# Patient Record
Sex: FEMALE | Race: WHITE | NOT HISPANIC OR LATINO | Employment: UNEMPLOYED | URBAN - METROPOLITAN AREA
[De-identification: names, ages, dates, MRNs, and addresses within clinical notes are randomized per-mention and may not be internally consistent; named-entity substitution may affect disease eponyms.]

---

## 2018-12-03 ENCOUNTER — HOSPITAL ENCOUNTER (INPATIENT)
Facility: HOSPITAL | Age: 38
LOS: 10 days | Discharge: HOME | DRG: 885 | End: 2018-12-13
Attending: EMERGENCY MEDICINE | Admitting: PSYCHIATRY & NEUROLOGY
Payer: MEDICARE

## 2018-12-03 DIAGNOSIS — F41.9 ANXIETY: Primary | ICD-10-CM

## 2018-12-03 PROBLEM — F32.A DEPRESSION: Status: ACTIVE | Noted: 2018-12-03

## 2018-12-03 LAB
ANION GAP SERPL CALC-SCNC: 7 MEQ/L (ref 3–15)
APAP SERPL-MCNC: <10 UG/ML (ref 10–30)
BASOPHILS # BLD: 0.05 K/UL (ref 0.01–0.1)
BASOPHILS NFR BLD: 0.6 %
BUN SERPL-MCNC: 13 MG/DL (ref 8–20)
CALCIUM SERPL-MCNC: 8.8 MG/DL (ref 8.9–10.3)
CHLORIDE SERPL-SCNC: 107 MEQ/L (ref 98–109)
CO2 SERPL-SCNC: 22 MEQ/L (ref 22–32)
CREAT SERPL-MCNC: 0.6 MG/DL
DIFFERENTIAL METHOD BLD: ABNORMAL
EOSINOPHIL # BLD: 0.21 K/UL (ref 0.04–0.36)
EOSINOPHIL NFR BLD: 2.7 %
ERYTHROCYTE [DISTWIDTH] IN BLOOD BY AUTOMATED COUNT: 12.3 % (ref 11.7–14.4)
ETHANOL SERPL-MCNC: <5 MG/DL
GFR SERPL CREATININE-BSD FRML MDRD: >60 ML/MIN/1.73M*2
GLUCOSE SERPL-MCNC: 77 MG/DL (ref 70–99)
HCG UR QL: NEGATIVE
HCT VFR BLDCO AUTO: 40.8 %
HGB BLD-MCNC: 13.7 G/DL
IMM GRANULOCYTES # BLD AUTO: 0.02 K/UL (ref 0–0.08)
IMM GRANULOCYTES NFR BLD AUTO: 0.3 %
LYMPHOCYTES # BLD: 3.46 K/UL (ref 1.2–3.5)
LYMPHOCYTES NFR BLD: 43.9 %
MCH RBC QN AUTO: 31 PG (ref 28–33.2)
MCHC RBC AUTO-ENTMCNC: 33.6 G/DL (ref 32.2–35.5)
MCV RBC AUTO: 92.3 FL (ref 83–98)
MONOCYTES # BLD: 0.6 K/UL (ref 0.28–0.8)
MONOCYTES NFR BLD: 7.6 %
NEUTROPHILS # BLD: 3.55 K/UL (ref 1.7–7)
NEUTS SEG NFR BLD: 44.9 %
NRBC BLD-RTO: 0.1 %
PDW BLD AUTO: 11.6 FL (ref 9.4–12.3)
PLATELET # BLD AUTO: 192 K/UL
POTASSIUM SERPL-SCNC: 3.7 MEQ/L (ref 3.6–5.1)
RBC # BLD AUTO: 4.42 M/UL (ref 3.93–5.22)
SALICYLATES SERPL-MCNC: <4 MG/DL
SODIUM SERPL-SCNC: 136 MEQ/L (ref 136–144)
WBC # BLD AUTO: 7.89 K/UL

## 2018-12-03 PROCEDURE — 85025 COMPLETE CBC W/AUTO DIFF WBC: CPT | Performed by: PHYSICIAN ASSISTANT

## 2018-12-03 PROCEDURE — 83735 ASSAY OF MAGNESIUM: CPT | Performed by: HOSPITALIST

## 2018-12-03 PROCEDURE — 63700000 HC SELF-ADMINISTRABLE DRUG: Performed by: PHYSICIAN ASSISTANT

## 2018-12-03 PROCEDURE — 84703 CHORIONIC GONADOTROPIN ASSAY: CPT | Performed by: PHYSICIAN ASSISTANT

## 2018-12-03 PROCEDURE — 12400000 HC ROOM AND CARE SEMIPRIVATE PSYCH

## 2018-12-03 PROCEDURE — 80156 ASSAY CARBAMAZEPINE TOTAL: CPT | Performed by: HOSPITALIST

## 2018-12-03 PROCEDURE — 99284 EMERGENCY DEPT VISIT MOD MDM: CPT

## 2018-12-03 PROCEDURE — 82248 BILIRUBIN DIRECT: CPT | Performed by: HOSPITALIST

## 2018-12-03 PROCEDURE — 36415 COLL VENOUS BLD VENIPUNCTURE: CPT | Performed by: PHYSICIAN ASSISTANT

## 2018-12-03 PROCEDURE — 80048 BASIC METABOLIC PNL TOTAL CA: CPT | Performed by: PHYSICIAN ASSISTANT

## 2018-12-03 PROCEDURE — G0480 DRUG TEST DEF 1-7 CLASSES: HCPCS | Performed by: PHYSICIAN ASSISTANT

## 2018-12-03 RX ORDER — IBUPROFEN 400 MG/1
400 TABLET ORAL EVERY 6 HOURS PRN
Status: DISCONTINUED | OUTPATIENT
Start: 2018-12-03 | End: 2018-12-13 | Stop reason: HOSPADM

## 2018-12-03 RX ORDER — ONDANSETRON 4 MG/1
4 TABLET, ORALLY DISINTEGRATING ORAL EVERY 8 HOURS PRN
Status: DISCONTINUED | OUTPATIENT
Start: 2018-12-03 | End: 2018-12-13 | Stop reason: HOSPADM

## 2018-12-03 RX ORDER — METHYLPHENIDATE HYDROCHLORIDE 10 MG/1
20 TABLET ORAL 2 TIMES DAILY
COMMUNITY
End: 2018-12-13 | Stop reason: HOSPADM

## 2018-12-03 RX ORDER — ALUMINUM HYDROXIDE, MAGNESIUM HYDROXIDE, AND SIMETHICONE 1200; 120; 1200 MG/30ML; MG/30ML; MG/30ML
30 SUSPENSION ORAL EVERY 4 HOURS PRN
Status: DISCONTINUED | OUTPATIENT
Start: 2018-12-03 | End: 2018-12-13 | Stop reason: HOSPADM

## 2018-12-03 RX ORDER — ACETAMINOPHEN 325 MG/1
650 TABLET ORAL EVERY 4 HOURS PRN
Status: DISCONTINUED | OUTPATIENT
Start: 2018-12-03 | End: 2018-12-13 | Stop reason: HOSPADM

## 2018-12-03 RX ORDER — HALOPERIDOL 5 MG/1
5 TABLET ORAL EVERY 6 HOURS PRN
Status: DISCONTINUED | OUTPATIENT
Start: 2018-12-03 | End: 2018-12-13 | Stop reason: HOSPADM

## 2018-12-03 RX ORDER — DIPHENHYDRAMINE HCL 25 MG
25 CAPSULE ORAL EVERY 6 HOURS PRN
Status: DISCONTINUED | OUTPATIENT
Start: 2018-12-03 | End: 2018-12-09

## 2018-12-03 RX ORDER — PAROXETINE 30 MG/1
TABLET, FILM COATED ORAL
COMMUNITY
Start: 2013-10-03 | End: 2018-12-13 | Stop reason: HOSPADM

## 2018-12-03 RX ORDER — LORAZEPAM 0.5 MG/1
0.5 TABLET ORAL EVERY 6 HOURS PRN
COMMUNITY
End: 2018-12-13 | Stop reason: HOSPADM

## 2018-12-03 RX ORDER — LORAZEPAM 1 MG/1
1 TABLET ORAL ONCE
Status: COMPLETED | OUTPATIENT
Start: 2018-12-03 | End: 2018-12-03

## 2018-12-03 RX ORDER — SOLIFENACIN SUCCINATE 5 MG/1
1 TABLET, FILM COATED ORAL DAILY
COMMUNITY
Start: 2013-10-03

## 2018-12-03 RX ORDER — HALOPERIDOL 5 MG/ML
5 INJECTION INTRAMUSCULAR EVERY 6 HOURS PRN
Status: DISCONTINUED | OUTPATIENT
Start: 2018-12-03 | End: 2018-12-13 | Stop reason: HOSPADM

## 2018-12-03 RX ORDER — LORAZEPAM 1 MG/1
1 TABLET ORAL EVERY 6 HOURS PRN
Status: DISCONTINUED | OUTPATIENT
Start: 2018-12-03 | End: 2018-12-09

## 2018-12-03 RX ORDER — LORAZEPAM 2 MG/ML
1 INJECTION INTRAMUSCULAR EVERY 6 HOURS PRN
Status: DISCONTINUED | OUTPATIENT
Start: 2018-12-03 | End: 2018-12-09

## 2018-12-03 RX ORDER — CARBAMAZEPINE 200 MG/1
200 TABLET ORAL 3 TIMES DAILY
COMMUNITY
End: 2019-02-27 | Stop reason: HOSPADM

## 2018-12-03 RX ORDER — DIPHENHYDRAMINE HCL 50 MG/ML
50 VIAL (ML) INJECTION EVERY 4 HOURS PRN
Status: DISCONTINUED | OUTPATIENT
Start: 2018-12-03 | End: 2018-12-13 | Stop reason: HOSPADM

## 2018-12-03 RX ORDER — LORATADINE 10 MG/1
10 TABLET ORAL DAILY
COMMUNITY

## 2018-12-03 RX ORDER — DOCUSATE SODIUM 100 MG/1
100 CAPSULE, LIQUID FILLED ORAL 2 TIMES DAILY
Status: DISCONTINUED | OUTPATIENT
Start: 2018-12-03 | End: 2018-12-05

## 2018-12-03 RX ORDER — LURASIDONE HYDROCHLORIDE 20 MG/1
20 TABLET, FILM COATED ORAL DAILY
COMMUNITY
End: 2018-12-13 | Stop reason: HOSPADM

## 2018-12-03 RX ADMIN — LORAZEPAM 1 MG: 1 TABLET ORAL at 19:16

## 2018-12-03 ASSESSMENT — ENCOUNTER SYMPTOMS
FEVER: 0
WEAKNESS: 0
CHEST TIGHTNESS: 0
HEADACHES: 0
ABDOMINAL PAIN: 0
VOMITING: 0
SHORTNESS OF BREATH: 0
COLOR CHANGE: 0
BACK PAIN: 0
NAUSEA: 0
CHILLS: 0
DIZZINESS: 0

## 2018-12-03 ASSESSMENT — COGNITIVE AND FUNCTIONAL STATUS - GENERAL
THOUGHT_CONTENT: APPROPRIATE
JUDGEMENT: IMPAIRED, MINIMALLY
PERCEPTUAL FUNCTION: NORMAL
MOOD: ANXIOUS
ORIENTATION: FULLY ORIENTED
AFFECT: TEARFUL;FLAT
APPEARANCE: HOSPITAL GOWN
INSIGHT: IMPAIRED, MINIMALLY
SPEECH: REGULAR
APPEARANCE: OTHER:
PSYCHOMOTOR FUNCTIONING: WNL
IMPULSE CONTROL: NORMAL

## 2018-12-03 NOTE — ED PROVIDER NOTES
"HPI     Chief Complaint   Patient presents with   • Psychiatric Evaluation     Pt reports worsening anxiety today. \"I had 10 anxiety attacks today\"       38-year-old female with history of autism, anxiety, bipolar disorder, depression presents for psychiatric evaluation.  Patient is not taking any of her medications in the past 3 days, states she was preoccupied, \"I do not give any excuses\".  She did take her at lunchtime medications today, but is uncertain which pills she actually took, as they are laid out for her by her behavioral counselor.  She reports worsening anxiety today, with multiple episodes of hyperventilation, crying, throwing herself on the floor and throwing her phone and angry.  She spoke with her boss today, who states she needed to come back to work with a work note.  When she explained that she may need inpatient treatment, she states he became upset and started yelling at her.  Her more anxious when she started crying again.  She is a behavioral counselor with her for 30 hours/week, but was there today to help calm her down and state over time.  Patient feels that she is not doing well at home, feels like her anxiety is out of control and that she can control her crying.  Denies SI/HI or drug use.  [facility] during which time she was admitted for 2 months.  She has multiple counselors and a psychiatrist, whom she feels comfortable with.  Feels that she may need inpatient therapy today.             Patient History     Past Medical History:   Diagnosis Date   • Anxiety    • Asperger syndrome    • Bipolar 1 disorder (CMS/[facility]) ([facility])    • Depression        History reviewed. No pertinent surgical history.    History reviewed. No pertinent family history.    Social History   Substance Use Topics   • Smoking status: Never Smoker   • Smokeless tobacco: Never Used   • Alcohol use No       Systems Reviewed from Nursing Triage:          Review of " "Systems     Review of Systems   Constitutional: Negative for chills and fever.   Respiratory: Negative for chest tightness and shortness of breath.    Cardiovascular: Negative for chest pain.   Gastrointestinal: Negative for abdominal pain, nausea and vomiting.   Musculoskeletal: Negative for back pain.   Skin: Negative for color change.   Neurological: Negative for dizziness, weakness and headaches.   All other systems reviewed and are negative.       Physical Exam     ED Triage Vitals [12/03/18 1808]   Temp Heart Rate Resp BP SpO2   37.2 °C (99 °F) 72 16 (!) 147/64 99 %      Temp Source Heart Rate Source Patient Position BP Location FiO2 (%) (Set)   Tympanic -- -- -- --                     Patient Vitals for the past 24 hrs:   BP Temp Temp src Pulse Resp SpO2 Height Weight   12/03/18 1808 (!) 147/64 37.2 °C (99 °F) Tympanic 72 16 99 % 1.626 m (5' 4\") 96.6 kg (213 lb)           Physical Exam   Constitutional: She is oriented to person, place, and time. She appears well-developed and well-nourished.   HENT:   Head: Normocephalic and atraumatic.   Neck: Normal range of motion. Neck supple.   Cardiovascular: Normal rate, regular rhythm, normal heart sounds and intact distal pulses.    Pulmonary/Chest: Effort normal and breath sounds normal.   Abdominal: Soft. Bowel sounds are normal. There is no tenderness.   Musculoskeletal: Normal range of motion.   Neurological: She is alert and oriented to person, place, and time.   Skin: Skin is warm and dry.   Psychiatric: Her speech is normal and behavior is normal. Thought content normal. Her mood appears anxious. She expresses no homicidal and no suicidal ideation.   Crying during interview   Nursing note and vitals reviewed.           Procedures    ED Course & MDM     Labs Reviewed   BASIC METABOLIC PANEL - Abnormal        Result Value    Sodium 136      Potassium 3.7      Chloride 107      CO2 22      BUN 13      Creatinine 0.6      Glucose 77      Calcium 8.8 (*)     eGFR " >60.0      Anion Gap 7     ER TOXICOLOGY SCR, SERUM - Abnormal     Salicylate <4.0      Acetaminophen <10.0 (*)     Ethanol <5     DIFF COUNT - Abnormal     Differential Type Auto      nRBC 0.1 (*)     Immature Granulocytes 0.3      Neutrophils 44.9      Lymphocytes 43.9      Monocytes 7.6      Eosinophils 2.7      Basophils 0.6      Immature Granulocytes, Absolute 0.02      Neutrophils, Absolute 3.55      Lymphocytes, Absolute 3.46      Monocytes, Absolute 0.60      Eosinophils, Absolute 0.21      Basophils, Absolute 0.05     BHCG, SERUM, QUAL - Normal    Preg Test, Serum Negative     CBC - Normal    WBC 7.89      RBC 4.42      Hemoglobin 13.7      Hematocrit 40.8      MCV 92.3      MCH 31.0      MCHC 33.6      RDW 12.3      Platelets 192      MPV 11.6     CBC AND DIFFERENTIAL    Narrative:     The following orders were created for panel order CBC and differential.  Procedure                               Abnormality         Status                     ---------                               -----------         ------                     CBC[[id number]]                           Normal              Final result               Diff Count[[id number]]                    Abnormal            Final result                 Please view results for these tests on the individual orders.   DRUG SCREEN PANEL, URINE   RAINBOW DRAW PANEL    Narrative:     The following orders were created for panel order [place] Draw Panel.  Procedure                               Abnormality         Status                     ---------                               -----------         ------                     RAINBOW RED[[id number]]                                       In process                   Please view results for these tests on the individual orders.   RAINBOW RED       No orders to display               MDM         ED Course as of Dec 03 2341   Mon Dec 03, 2018   1946 D/w [facility] social work for ED evaluation  [EK]   2000 Medically cleared   [EK]   2212 Pt admitted to [facility] psychiatric unit  [EK]      ED Course User Index  [EK] Bria Eddy PA C         Clinical Impressions as of Dec 03 2341   Anxiety     Transfer to Behavioral Health     Bria Eddy PA C  12/03/18 1061

## 2018-12-04 PROBLEM — K21.9 GASTROESOPHAGEAL REFLUX DISEASE WITHOUT ESOPHAGITIS: Status: ACTIVE | Noted: 2018-12-04

## 2018-12-04 PROBLEM — F31.9 BIPOLAR DISORDER WITH DEPRESSION (CMS/HCC): Status: ACTIVE | Noted: 2018-12-04

## 2018-12-04 PROBLEM — Z87.2: Status: ACTIVE | Noted: 2018-12-04

## 2018-12-04 PROBLEM — J30.1 ALLERGIC RHINITIS DUE TO POLLEN: Status: ACTIVE | Noted: 2018-12-04

## 2018-12-04 PROBLEM — F41.0 PANIC ATTACKS: Status: ACTIVE | Noted: 2018-12-04

## 2018-12-04 LAB
ALBUMIN SERPL-MCNC: 4.4 G/DL (ref 3.4–5)
ALP SERPL-CCNC: 42 IU/L (ref 35–126)
ALT SERPL-CCNC: 20 IU/L (ref 11–54)
AST SERPL-CCNC: 22 IU/L (ref 15–41)
BILIRUB DIRECT SERPL-MCNC: 0.1 MG/DL
BILIRUB SERPL-MCNC: 0.2 MG/DL (ref 0.3–1.2)
CARBAMAZEPINE SERPL-MCNC: 5.7 UG/ML (ref 8–12)
MAGNESIUM SERPL-MCNC: 2 MG/DL (ref 1.8–2.5)
PROT SERPL-MCNC: 7 G/DL (ref 6–8.2)

## 2018-12-04 PROCEDURE — 63700000 HC SELF-ADMINISTRABLE DRUG: Performed by: STUDENT IN AN ORGANIZED HEALTH CARE EDUCATION/TRAINING PROGRAM

## 2018-12-04 PROCEDURE — 63700000 HC SELF-ADMINISTRABLE DRUG: Performed by: PSYCHIATRY & NEUROLOGY

## 2018-12-04 PROCEDURE — 90792 PSYCH DIAG EVAL W/MED SRVCS: CPT | Performed by: PSYCHIATRY & NEUROLOGY

## 2018-12-04 PROCEDURE — 12400000 HC ROOM AND CARE SEMIPRIVATE PSYCH

## 2018-12-04 PROCEDURE — 200200 PR NO CHARGE: Performed by: HOSPITALIST

## 2018-12-04 RX ORDER — FAMOTIDINE 10 MG/1
10 TABLET ORAL 2 TIMES DAILY
Status: DISCONTINUED | OUTPATIENT
Start: 2018-12-04 | End: 2018-12-04

## 2018-12-04 RX ORDER — LORAZEPAM 0.5 MG/1
0.5 TABLET ORAL 3 TIMES DAILY
Status: DISCONTINUED | OUTPATIENT
Start: 2018-12-04 | End: 2018-12-06

## 2018-12-04 RX ORDER — RISPERIDONE 0.5 MG/1
0.5 TABLET, ORALLY DISINTEGRATING ORAL 2 TIMES DAILY
Status: DISCONTINUED | OUTPATIENT
Start: 2018-12-04 | End: 2018-12-09

## 2018-12-04 RX ORDER — FAMOTIDINE 20 MG/1
20 TABLET, FILM COATED ORAL 2 TIMES DAILY
Status: DISCONTINUED | OUTPATIENT
Start: 2018-12-04 | End: 2018-12-13 | Stop reason: HOSPADM

## 2018-12-04 RX ORDER — CARBAMAZEPINE 200 MG/1
200 TABLET ORAL 3 TIMES DAILY
Status: DISCONTINUED | OUTPATIENT
Start: 2018-12-04 | End: 2018-12-13 | Stop reason: HOSPADM

## 2018-12-04 RX ORDER — LORATADINE 10 MG/1
10 TABLET ORAL DAILY
Status: DISCONTINUED | OUTPATIENT
Start: 2018-12-04 | End: 2018-12-04

## 2018-12-04 RX ORDER — FAMOTIDINE 20 MG/1
40 TABLET, FILM COATED ORAL 2 TIMES DAILY
Status: DISCONTINUED | OUTPATIENT
Start: 2018-12-04 | End: 2018-12-04

## 2018-12-04 RX ORDER — PAROXETINE HYDROCHLORIDE 20 MG/1
20 TABLET, FILM COATED ORAL 2 TIMES DAILY
Status: COMPLETED | OUTPATIENT
Start: 2018-12-04 | End: 2018-12-06

## 2018-12-04 RX ORDER — LORATADINE 10 MG/1
10 TABLET ORAL DAILY
Status: DISCONTINUED | OUTPATIENT
Start: 2018-12-05 | End: 2018-12-13 | Stop reason: HOSPADM

## 2018-12-04 RX ORDER — QUETIAPINE FUMARATE 50 MG/1
50 TABLET, FILM COATED ORAL NIGHTLY
Status: DISCONTINUED | OUTPATIENT
Start: 2018-12-04 | End: 2018-12-05

## 2018-12-04 RX ADMIN — FAMOTIDINE 20 MG: 20 TABLET, FILM COATED ORAL at 17:09

## 2018-12-04 RX ADMIN — PAROXETINE 30 MG: 20 TABLET, FILM COATED ORAL at 01:24

## 2018-12-04 RX ADMIN — CARBAMAZEPINE 200 MG: 200 TABLET ORAL at 12:30

## 2018-12-04 RX ADMIN — FAMOTIDINE 10 MG: 10 TABLET, FILM COATED ORAL at 01:23

## 2018-12-04 RX ADMIN — CARBAMAZEPINE 200 MG: 200 TABLET ORAL at 09:05

## 2018-12-04 RX ADMIN — ACETAMINOPHEN 650 MG: 325 TABLET, FILM COATED ORAL at 17:10

## 2018-12-04 RX ADMIN — CARBAMAZEPINE 200 MG: 200 TABLET ORAL at 17:48

## 2018-12-04 RX ADMIN — RISPERIDONE 0.5 MG: 0.5 TABLET, ORALLY DISINTEGRATING ORAL at 17:48

## 2018-12-04 RX ADMIN — LORAZEPAM 0.5 MG: 0.5 TABLET ORAL at 09:05

## 2018-12-04 RX ADMIN — PAROXETINE 30 MG: 20 TABLET, FILM COATED ORAL at 09:07

## 2018-12-04 RX ADMIN — DOCUSATE SODIUM 100 MG: 100 CAPSULE, LIQUID FILLED ORAL at 01:24

## 2018-12-04 RX ADMIN — LORAZEPAM 0.5 MG: 0.5 TABLET ORAL at 12:35

## 2018-12-04 RX ADMIN — QUETIAPINE 50 MG: 50 TABLET, FILM COATED ORAL at 21:43

## 2018-12-04 RX ADMIN — LORAZEPAM 0.5 MG: 0.5 TABLET ORAL at 01:25

## 2018-12-04 RX ADMIN — LORAZEPAM 0.5 MG: 0.5 TABLET ORAL at 17:48

## 2018-12-04 RX ADMIN — CARBAMAZEPINE 200 MG: 200 TABLET ORAL at 01:23

## 2018-12-04 RX ADMIN — PAROXETINE 20 MG: 20 TABLET, FILM COATED ORAL at 17:48

## 2018-12-04 ASSESSMENT — COGNITIVE AND FUNCTIONAL STATUS - GENERAL: COGNITION: NO DEFICITS NOTED
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
## 2018-12-04 NOTE — CONSULTS
I was consulted by phone to review the case of this 39yo woman who was medically cleared and admitted for depression.  PMH negative for significant medical issues.  Allergy to PCN.  AVSS, reassuring admission labs.  UDS/BAL negative.   Pregnancy test negative.  Will restart confirmed home meds per nursing request.

## 2018-12-04 NOTE — PLAN OF CARE
Problem: Patient Care Overview (Adult)  Goal: Plan of Care Review   12/04/18 0935   Coping/Psychosocial   Plan Of Care Reviewed With patient   Coping/Psychosocial   Patient Agreement with Plan of Care agrees   Plan of Care Review   Progress no change   Outcome Summary  will conduct psychosocial assessment and d/c needs assessment.     Goal: Discharge Needs Assessment   12/04/18 0935   DC Needs Assessment   Readmission Within The Last 30 Days no previous admission in last 30 days   Current Discharge Risk psychiatric illness   Swallowing Clinical Impression   Anticipated Discharge Disposition home with outpatient services   Living Environment   Transportation Available family or friend will provide       Problem: Overarching Goals (Adult)  Goal: Optimized Coping Skills in Response to Life Stressors    Intervention: Promote Effective Coping Strategies   12/04/18 0935   Coping/Psychosocial Interventions   Supportive Measures active listening utilized;self-reflection promoted;counseling provided       Goal: Develops/Participates in Therapeutic [place] to Support Successful Transition    Intervention: Foster Therapeutic [place]   12/04/18 0935   Psychosocial Support   Trust Relationship/Rapport care explained;empathic listening provided;emotional support provided     Intervention: Mutually Develop Transition Plan   12/04/18 0935   Mutually Develop Transition Plan   Transition Support community resources reviewed  (case management)

## 2018-12-04 NOTE — PLAN OF CARE
Problem: Patient Care Overview (Adult)  Goal: Plan of Care Review  Outcome: Ongoing (interventions implemented as appropriate)   12/04/18 0025   Coping/Psychosocial   Plan Of Care Reviewed With patient   Coping/Psychosocial   Patient Agreement with Plan of Care agrees   Plan of Care Review   Progress progress toward functional goa   12/04/18 0025   Coping/Psychosocial   Plan Of Care Reviewed With patient   Coping/Psychosocial   Patient Agreement with Plan of Care agrees   Plan of Care Review   Progress progress toward functional goals as expected   ls as expected       Problem: Mood Impairment (Anxiety Signs/Symptoms) (Adult)  Goal: Improved Mood Symptoms  Outcome: Ongoing (interventions implemented as appropriate)

## 2018-12-04 NOTE — NURSING NOTE
Erika Vaughan is a 38 year old female; voluntary admission from ED. She presented with worsening depression, panic attacks and decline in functioning. She reports a labile mood, with low energy and frequent sadness and panic. No drugs or etoh. Hx of autism. Previously on psych unit at [facility] 5 years ago for 2 months. She lives alone, has a , and works part time. Pt accompanied by mother, who is supportive and involved. She denies SI. Continue to monitor and offer support.

## 2018-12-04 NOTE — STUDENT
"Called Ms. Vaughan's mom, Puja ([phone]), to request she brings the patient's CPAP machine. She and her  will bring it tonight. Also obtained some collateral information regarding patient's current situation.     Ms. Vaughan's job as a  is reportedly much lower stress than her previous jobs. Ms. Vaughan's dad called the boss yesterday to clarify the situation - the boss admitted to not understanding autism but is willing to work with and help her. 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 Puja believes that stressors within the patient's group of friends may be contributing to her drop in work performance. She says Ms. Vaughan often \"takes on the burdens of others\" and only has a couple of close friends so they are \"very bi to her\", therefore she has been particularly affected by the problems happening in her friend group.    Regarding the recent medication changes made by the outpatient psychiatrist Dr. Moser, Puja believes that the most recent change was about 1 month ago due to mood swings. Apparently the patient's Latuda was discontinued for 2 months as a trial but her support team noted worsening mood and agitation symptoms, so Dr. Moser restarted Latuda 1 month ago.    Puja reiterated the patient's desire for us to help find a new outpatient psychiatrist.    She also noted that the patient \"has problems this time of year\" and normally uses light therapy for SAD 2x/day.     She also described Ms. Vaughan's developmental history a bit more. 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 In high school she was diagnosed with some kind of argumentative disorder - \"not ODD\". In her mid-20s a psychiatrist mentioned she may have bipolar disorder. During her hospitalization at [facility] 5 years ago she was formally diagnosed with autism spectrum disorder after Puja pushed for a full " neuropsychiatric evaluation.      Juan Jose Armando, MS3  [facility]  Psychiatry  Date: 12/4/2018

## 2018-12-04 NOTE — ASSESSMENT & PLAN NOTE
Recommend continuing Famotidine 40 mg BID.   If worsening symptoms, can switch to Protonix 40 mg po daily

## 2018-12-04 NOTE — PLAN OF CARE
Problem: Patient Care Overview (Adult)  Goal: Plan of Care Review  Outcome: Ongoing (interventions implemented as appropriate)   12/04/18 1242   Coping/Psychosocial   Plan Of Care Reviewed With patient   Coping/Psychosocial   Patient Agreement with Plan of Care agrees   Plan of Care Review   Progress progress towards functional goals is fair   Outcome Summary Pt frustrated this am upset not getting Ritalin. Agitated about meds. Labile crying after comunity meeting fixated on meds. Reports depress 6/10 anxiety high gave no rating, denies SI. Social with peers, playing games during free time. Did laundry. Visible on unit. Was med compliant with what meds are ordered. Perseverative on Ritalin.        Problem: Mood Impairment (Anxiety Signs/Symptoms) (Adult)  Goal: Improved Mood Symptoms  Outcome: Ongoing (interventions implemented as appropriate)
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
## 2018-12-04 NOTE — ASSESSMENT & PLAN NOTE
Recommend close observance of medications and avoidance of meds which may precipitate Ramírez-Clement syndrome.  Patient has been tolerating Carbamazepine and has no reaction.

## 2018-12-04 NOTE — ASSESSMENT & PLAN NOTE
Recommend psychiatry evaluation  Check TSH, LFT's at baseline  Recommend check Carbamazepine (Tegretol) level at baseline.

## 2018-12-04 NOTE — ED ATTESTATION NOTE
I have personally seen and examined the patient.  I reviewed and agree with physician assistant / nurse practitioner’s assessment and plan of care, with the following exceptions: None  My examination, assessment, and plan of care of Erika Vaughan is as follows:     PT co anxiety/depression.  Has had multiple panic attacks today. Denies SI.  Exam; awake, alert. NAD, normal affect     Law Lopez MD  12/03/18 1959

## 2018-12-04 NOTE — CONSULTS
"   [facility] Medicine Service -  Inpatient Consultation         Requesting Physician: Dr. Paco Prasad    Reason for Consultation: Medical evaluation and management     HISTORY OF PRESENT ILLNESS        This is a 38 y.o. female with a Hx Asperger's syndrome, Bipolar with depression.    Patient presented to the emergency department with complaints of worsening panic attacks and anxiety yesterday.  Patient reports that she had 14 episodes of panic attacks from 10:30 in the morning to 4:00 in the afternoon.  She reports that what triggered this was her boss who did not understand her mental illness.  She reports that she was on the phone to her boss and was about to call out sick.  He did not understand why she was calling out sick and asked her to go to work.  She is apparently screamed at him, threw her phone and eventually try to reason with him.  This made her extremely anxious as she felt that she could not go to work.    She reports that she feels like she was losing it.  She came to the emergency department with her mother to \"...  Try to nip it in the butt\".  Patient reports that over the last few months she has had increased worsening anxiety.  She attributes this to changes in her medications.  She believes that the medications that her therapist Dr. Moser prescribed has not been helping her.  She reports that she has been noncompliant with her medications on Friday, Sunday and yesterday.  She reports no suicidal or homicidal ideation.  She also denies any auditory and visual hallucinations.  She tells me that she feels hopeless, has had poor sleep and tends to overeat.  She has had frequent a.m. awakenings and has difficulty initiating sleep.    She reports chronic watery itchy eyes as well as postnasal drip.  She does report occasional nasal congestion.  She currently has no other medical problems.  She denies any other issues.    Review of systems:    No rhinorrhea, sore throat, otalgia.  No visual " changes, headaches, dizziness, focal weakness, speech disturbances.  No dysphagia.  No cough or shortness of breath or dyspnea on exertion.  No chest pain, palpitations, leg edema, orthopnea, paroxysmal nocturnal dyspnea.  No melena or bright red blood per rectum.  No abdominal pain, nausea or vomiting, diarrhea or constipation.  No dysuria or hematuria.  No myalgias or joint pain.  No fever, chills, rigors.  No rashes.    Other than what has been mentioned, the remainder of the review of systems otherwise negative.    PAST MEDICAL AND SURGICAL HISTORY        Past Medical History:   Diagnosis Date   • Anxiety    • Asperger syndrome    • Bipolar 1 disorder (CMS/HCC) (HCC)    • Depression        Past Surgical History:   Procedure Laterality Date   • DEBRIDEMENT SKIN / SUBCU / MUSCLE OF HEAD / NECK     • PILONIDAL CYST / SINUS EXCISION     • TONSILLECTOMY AND ADENOIDECTOMY     • WISDOM TOOTH EXTRACTION         Unable, To Obtain Pcp    MEDICATIONS        Home Medications:  Prescriptions Prior to Admission   Medication Sig Dispense Refill Last Dose   • carBAMazepine (TEGretol) 200 mg tablet Take 200 mg by mouth 3 (three) times a day.   12/4/2018 at Unknown time   • loratadine (CLARITIN) 10 mg tablet Take 10 mg by mouth daily.   12/4/2018 at Unknown time   • LORazepam (ATIVAN) 0.5 mg tablet Take 0.5 mg by mouth every 6 (six) hours as needed for anxiety.   12/4/2018 at Unknown time   • methylphenidate HCl (RITALIN) 10 mg tablet Take 20 mg by mouth 2 (two) times a day.   12/4/2018 at Unknown time   • PARoxetine (PAXIL) 30 mg tablet BID  AM and HS   12/4/2018 at Unknown time   • ranitidine (ZANTAC) 150 mg tablet Take 300 mg by mouth 2 (two) times a day. AM and HS    12/4/2018 at Unknown time   • lurasidone (LATUDA) 20 mg tablet Take 20 mg by mouth daily.   Unknown at Unknown time   • solifenacin (VESICARE) 5 mg tablet Take 1 tablet by mouth daily.   Unknown at Unknown time       Current inpatient medications were personally  "reviewed.    ALLERGIES        Penicillins    FAMILY HISTORY        History reviewed. No pertinent family history.    SOCIAL HISTORY        Social History     Social History   • Marital status: Single     Spouse name: N/A   • Number of children: 0   • Years of education: N/A     Occupational History   • part-time  for [employer]      Social History Main Topics   • Smoking status: Never Smoker   • Smokeless tobacco: Never Used   • Alcohol use Yes      Comment: Rare alcohol consumption   • Drug use: No   • Sexual activity: Not Asked     Other Topics Concern   • None     Social History Narrative    Patient is single and resides alone    She works as a        REVIEW OF SYSTEMS        All other systems reviewed and negative except as noted in HPI    PHYSICAL EXAMINATION        BP (!) 115/56   Pulse 60   Temp 37.2 °C (99 °F) (Tympanic)   Resp 16   Ht 1.626 m (5' 4\")   Wt 96.6 kg (213 lb)   SpO2 99%   BMI 36.56 kg/m²   Body mass index is 36.56 kg/m².  No intake or output data in the 24 hours ending 12/04/18 0738    Physical Exam   The patient refused a physical examination.  Most of my examination at this time is from observation.  General: Patient is uncooperative with physical examination.  She is only cooperative with answering questions.  HEENT: Normocephalic, atraumatic.  Extraocular movements are intact.  She has periorbital puffiness.  Cardiovascular: Patient would not allow me to examine.  Neck: Patient would not allow me to examine  Pulmonary: Patient would not allow me to examine.  Abdomen: Patient appears morbidly obese.  Extremities: Patient has no edema in her upper extremities.    Neurologic: She is able to prop herself up on her arms as she is talking to me in her bed.  She is alert to person place and time.  She is able move all 4 extremities equally and symmetrically.  Opens her eyes spontaneously.  Is able to talk in complete sentences.  Musculoskeletal: There does not appear to be any " atrophy of her extremities.  No deformities are noted.  Psychiatric: Patient is a 38-year-old female who is currently lying in bed and has a gray blue T-shirt on.  She is slightly disheveled.  She makes good eye contact throughout the interview.  At times she is hostile and withdrawn.  She has no auditory visual hallucinations.  She has poor insight and poor judgment.            LABS / EKG        Labs  Sodium 136.  Potassium 3.7.  Chloride 107.  Bicarb 22.  Anion gap 7.  Glucose 77.  BUN 13 creatinine 0.6.  Calcium 8.8.  Magnesium 2.0. Calcium 8.8.    Acetaminophen level less than 10.  Salicylate level less than 4.  Serum pregnancy test was negative.  Ethanol level less than 5.    White cell count 7.89.  Hemoglobin 13.7.  Hematocrit 40.8.  MCV 92.3.  Platelet count 192.  Differential 44.9% neutrophils, 43.9% lymphocytes, 7.6% monocytes.  2.7 eosinophils., 0.6% basophils.    ASSESSMENT AND RECOMMENDATIONS           * Allergic rhinitis due to pollen   Assessment & Plan    Recommend continuing patient on Claritin 10 mg daily.        H/O Ramírez-Clement toxic epidermal necrolysis overlap syndrome   Assessment & Plan    Recommend close observance of medications and avoidance of meds which may precipitate Ramírez-Clement syndrome.  Patient has been tolerating Carbamazepine and has no reaction.         Gastroesophageal reflux disease without esophagitis   Assessment & Plan    Recommend continuing Famotidine 40 mg BID.   If worsening symptoms, can switch to Protonix 40 mg po daily        Bipolar disorder with depression (CMS/HCC) (HCC)   Assessment & Plan    Recommend psychiatry evaluation  Check TSH, LFT's at baseline  Recommend check Carbamazepine (Tegretol) level at baseline.         Panic attacks   Assessment & Plan    Psychiatry consult.                   Anatoly Cox MD  12/4/2018
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
## 2018-12-05 PROBLEM — F39 UNSPECIFIED MOOD (AFFECTIVE) DISORDER (CMS/HCC): Status: ACTIVE | Noted: 2018-12-05

## 2018-12-05 PROBLEM — F84.0 AUTISM SPECTRUM DISORDER: Status: ACTIVE | Noted: 2018-12-05

## 2018-12-05 PROBLEM — F60.89 CLUSTER B PERSONALITY DISORDER (CMS/HCC): Status: ACTIVE | Noted: 2018-12-05

## 2018-12-05 PROCEDURE — 63700000 HC SELF-ADMINISTRABLE DRUG: Performed by: PSYCHIATRY & NEUROLOGY

## 2018-12-05 PROCEDURE — 12400000 HC ROOM AND CARE SEMIPRIVATE PSYCH

## 2018-12-05 PROCEDURE — 63700000 HC SELF-ADMINISTRABLE DRUG: Performed by: HOSPITALIST

## 2018-12-05 PROCEDURE — 63700000 HC SELF-ADMINISTRABLE DRUG: Performed by: STUDENT IN AN ORGANIZED HEALTH CARE EDUCATION/TRAINING PROGRAM

## 2018-12-05 PROCEDURE — 99232 SBSQ HOSP IP/OBS MODERATE 35: CPT | Performed by: PSYCHIATRY & NEUROLOGY

## 2018-12-05 RX ORDER — DOCUSATE SODIUM 100 MG/1
100 CAPSULE, LIQUID FILLED ORAL 2 TIMES DAILY PRN
Status: DISCONTINUED | OUTPATIENT
Start: 2018-12-05 | End: 2018-12-13 | Stop reason: HOSPADM

## 2018-12-05 RX ORDER — QUETIAPINE FUMARATE 25 MG/1
25 TABLET, FILM COATED ORAL NIGHTLY
Status: DISCONTINUED | OUTPATIENT
Start: 2018-12-05 | End: 2018-12-05

## 2018-12-05 RX ORDER — QUETIAPINE FUMARATE 25 MG/1
25 TABLET, FILM COATED ORAL NIGHTLY
Status: DISCONTINUED | OUTPATIENT
Start: 2018-12-05 | End: 2018-12-08

## 2018-12-05 RX ADMIN — LORAZEPAM 0.5 MG: 0.5 TABLET ORAL at 12:23

## 2018-12-05 RX ADMIN — QUETIAPINE 25 MG: 50 TABLET, FILM COATED ORAL at 21:09

## 2018-12-05 RX ADMIN — CARBAMAZEPINE 200 MG: 200 TABLET ORAL at 17:25

## 2018-12-05 RX ADMIN — ACETAMINOPHEN 650 MG: 325 TABLET, FILM COATED ORAL at 13:50

## 2018-12-05 RX ADMIN — RISPERIDONE 0.5 MG: 0.5 TABLET, ORALLY DISINTEGRATING ORAL at 09:28

## 2018-12-05 RX ADMIN — FAMOTIDINE 20 MG: 20 TABLET, FILM COATED ORAL at 09:27

## 2018-12-05 RX ADMIN — FAMOTIDINE 20 MG: 20 TABLET, FILM COATED ORAL at 17:26

## 2018-12-05 RX ADMIN — LORAZEPAM 0.5 MG: 0.5 TABLET ORAL at 17:25

## 2018-12-05 RX ADMIN — LORAZEPAM 0.5 MG: 0.5 TABLET ORAL at 09:28

## 2018-12-05 RX ADMIN — CARBAMAZEPINE 200 MG: 200 TABLET ORAL at 12:22

## 2018-12-05 RX ADMIN — LORATADINE 10 MG: 10 TABLET ORAL at 09:27

## 2018-12-05 RX ADMIN — PAROXETINE 20 MG: 20 TABLET, FILM COATED ORAL at 17:26

## 2018-12-05 RX ADMIN — PAROXETINE 20 MG: 20 TABLET, FILM COATED ORAL at 09:27

## 2018-12-05 RX ADMIN — RISPERIDONE 0.5 MG: 0.5 TABLET, ORALLY DISINTEGRATING ORAL at 17:27

## 2018-12-05 RX ADMIN — ACETAMINOPHEN 650 MG: 325 TABLET, FILM COATED ORAL at 06:56

## 2018-12-05 RX ADMIN — CARBAMAZEPINE 200 MG: 200 TABLET ORAL at 09:27

## 2018-12-05 NOTE — PROGRESS NOTES
"Psychiatry Progress Note    Chief Complaint/Reason for follow-up: Mood disorder other, Cluster B personality traits.  Autism Spectrum disorder.    Interval History: Patient states that she feels her anxiety and panic are \"better.\"  She says Her symptoms were 10/10 for, but now are a 6/10. She does complain of feeling \"weird, too tired.\"  She is informed that this could be withdrawal from her Ritalin or oversedation from the Seroquel she is agreeable to reducing the Seroquel from 50-25 mg HS .    Review of Systems:   Sleep:  Good, Appetite: Good and GI: No complaints    Vital Signs for the last 24 hours:  Temp:  [36.9 °C (98.4 °F)] 36.9 °C (98.4 °F)  Heart Rate:  [72-77] 77  Resp:  [18] 18  BP: (127-132)/(56-67) 132/56    Medications  Scheduled    carBAMazepine 200 mg oral TID   famotidine 20 mg oral BID   loratadine 10 mg oral Daily   LORazepam 0.5 mg oral TID   PARoxetine 20 mg oral BID   QUEtiapine 25 mg oral Nightly   risperiDONE 0.5 mg oral BID     PRN  •  acetaminophen  •  alum-mag hydroxide-simeth  •  diphenhydrAMINE  •  diphenhydrAMINE  •  docusate sodium  •  haloperidol  •  haloperidol lactate  •  ibuprofen  •  LORazepam  •  LORazepam  •  ondansetron ODT      Mental Status Exam:  Appearance: appropriate attire  Gait and Motor: slow  Speech: normal rate/rhythm/volume  Mood: better  Affect: restricted  Associations: coherent  Thought Process: goal-directed  Thought Content: appropriate to situation  Suicidality/Homicidality: denies  Judgement/Insight: help accepting  Orientation: day, month, year, season, type of place, name of place, city and situation  Memory: recalls recent events  Attention: alert      Assessment/Plan  * Unspecified mood (affective) disorder (CMS/[facility])   Assessment & Plan    Start risperdal 0.5 BID for mood stability  Reduce Paxil for ineffectiveness  Hold Ritalin for activation        Patient seen and evaluated, we discussed the treatment plan today.  I spent approximately 25 minutes in " therapy with this patient.    Ga Horne MD  12/5/2018

## 2018-12-05 NOTE — PLAN OF CARE
Problem: Overarching Goals (Adult)  Goal: Adheres to Safety Considerations for Self and Others    Intervention: Develop/Maintain Individualized Safety Plan   12/05/18 1525   Develop/Maintain Individualized Safety Plan   Safety Measures self-directed behavior promoted      12/05/18 1525   Develop/Maintain Individualized Safety Plan   Safety Measures self-directed behavior promoted       Goal: Optimized Coping Skills in Response to Life Stressors    Intervention: Promote Effective Coping Strategies   12/05/18 1525   Coping/Psychosocial Interventions   Supportive Measures positive reinforcement provided;relaxation techniques promoted;self-care encouraged       Goal: Develops/Participates in Therapeutic [place] to Support Successful Transition    Intervention: Foster Therapeutic [place]   12/05/18 1525   Psychosocial Support   Trust Relationship/Rapport empathic listening provided;questions encouraged

## 2018-12-05 NOTE — PLAN OF CARE
Problem: Patient Care Overview (Adult)  Goal: Plan of Care Review  Outcome: Ongoing (interventions implemented as appropriate)   12/05/18 1501   Coping/Psychosocial   Plan Of Care Reviewed With patient   Coping/Psychosocial   Patient Agreement with Plan of Care agrees   Plan of Care Review   Progress improving   Outcome Summary Irritable in early part of shift. Eating 50-75%, but did c/o nausea early in day. (no vomiting). Less focused on medications later in day. Compliant, did attend groups.  Asked for and received a printout of medication schedule, which I reviewed with patient. Denies si at this time.  Depressed mood and restricted affect.

## 2018-12-05 NOTE — ASSESSMENT & PLAN NOTE
Start risperdal 0.5 BID for mood stability  D/C Paxil for ineffectiveness  D/C Ritalin for activation

## 2018-12-05 NOTE — PLAN OF CARE
Problem: Patient Care Overview (Adult)  Goal: Plan of Care Review  Outcome: Ongoing (interventions implemented as appropriate)   12/04/18 0594   Coping/Psychosocial   Plan Of Care Reviewed With patient   Coping/Psychosocial   Patient Agreement with Plan of Care agrees   Plan of Care Review   Progress progress toward functional goals as expected   Outcome Summary Pt was visible on the unit in the start of the shift, but was irritable as she states her medications are not ordered correctly. Pt continues to ask why ritalin is not ordered. After taking her evening meds, pt states that she feels tired. Pt was apologetic stating that she was in a bad mood and that she slept poorly. Pt's appetite is good. She had a visit from her parents who brought in CPAP mask for her. She rates her anxiety and depression high and denies SI. She was med compliant and went to bed after visiting hours.        Problem: Mood Impairment (Anxiety Signs/Symptoms) (Adult)  Goal: Improved Mood Symptoms  Outcome: Ongoing (interventions implemented as appropriate)

## 2018-12-06 PROCEDURE — 63700000 HC SELF-ADMINISTRABLE DRUG: Performed by: PSYCHIATRY & NEUROLOGY

## 2018-12-06 PROCEDURE — 12400000 HC ROOM AND CARE SEMIPRIVATE PSYCH

## 2018-12-06 PROCEDURE — 99232 SBSQ HOSP IP/OBS MODERATE 35: CPT | Performed by: PSYCHIATRY & NEUROLOGY

## 2018-12-06 PROCEDURE — 63700000 HC SELF-ADMINISTRABLE DRUG: Performed by: STUDENT IN AN ORGANIZED HEALTH CARE EDUCATION/TRAINING PROGRAM

## 2018-12-06 PROCEDURE — 63700000 HC SELF-ADMINISTRABLE DRUG: Performed by: HOSPITALIST

## 2018-12-06 RX ORDER — PAROXETINE 10 MG/1
10 TABLET, FILM COATED ORAL 2 TIMES DAILY
Status: DISCONTINUED | OUTPATIENT
Start: 2018-12-06 | End: 2018-12-08

## 2018-12-06 RX ORDER — LORAZEPAM 0.5 MG/1
0.5 TABLET ORAL 2 TIMES DAILY
Status: DISCONTINUED | OUTPATIENT
Start: 2018-12-07 | End: 2018-12-07

## 2018-12-06 RX ORDER — VENLAFAXINE HYDROCHLORIDE 37.5 MG/1
37.5 CAPSULE, EXTENDED RELEASE ORAL
Status: DISCONTINUED | OUTPATIENT
Start: 2018-12-06 | End: 2018-12-10

## 2018-12-06 RX ORDER — VENLAFAXINE HYDROCHLORIDE 37.5 MG/1
37.5 CAPSULE, EXTENDED RELEASE ORAL
Status: DISCONTINUED | OUTPATIENT
Start: 2018-12-07 | End: 2018-12-06

## 2018-12-06 RX ADMIN — VENLAFAXINE HYDROCHLORIDE 37.5 MG: 37.5 CAPSULE, EXTENDED RELEASE ORAL at 12:27

## 2018-12-06 RX ADMIN — RISPERIDONE 0.5 MG: 0.5 TABLET, ORALLY DISINTEGRATING ORAL at 17:25

## 2018-12-06 RX ADMIN — FAMOTIDINE 20 MG: 20 TABLET, FILM COATED ORAL at 17:25

## 2018-12-06 RX ADMIN — RISPERIDONE 0.5 MG: 0.5 TABLET, ORALLY DISINTEGRATING ORAL at 08:30

## 2018-12-06 RX ADMIN — FAMOTIDINE 20 MG: 20 TABLET, FILM COATED ORAL at 08:30

## 2018-12-06 RX ADMIN — CARBAMAZEPINE 200 MG: 200 TABLET ORAL at 17:25

## 2018-12-06 RX ADMIN — LORAZEPAM 0.5 MG: 0.5 TABLET ORAL at 08:30

## 2018-12-06 RX ADMIN — LORATADINE 10 MG: 10 TABLET ORAL at 08:30

## 2018-12-06 RX ADMIN — LORAZEPAM 0.5 MG: 0.5 TABLET ORAL at 12:29

## 2018-12-06 RX ADMIN — QUETIAPINE 25 MG: 50 TABLET, FILM COATED ORAL at 23:00

## 2018-12-06 RX ADMIN — CARBAMAZEPINE 200 MG: 200 TABLET ORAL at 08:30

## 2018-12-06 RX ADMIN — PAROXETINE 20 MG: 20 TABLET, FILM COATED ORAL at 08:30

## 2018-12-06 RX ADMIN — CARBAMAZEPINE 200 MG: 200 TABLET ORAL at 12:29

## 2018-12-06 RX ADMIN — LORAZEPAM 1 MG: 1 TABLET ORAL at 23:00

## 2018-12-06 RX ADMIN — PAROXETINE HYDROCHLORIDE 10 MG: 10 TABLET, FILM COATED ORAL at 17:25

## 2018-12-06 NOTE — STUDENT
"Medical Student Note For Educational Purposes Only - Do Not Use For Coding Or Billing.    Psychiatry Progress Note    Chief Complaint: \"I feel like a zombie\"    Interval History: Spoke with Erika this morning on the sofa. Reports anxiety is 6/10 down from 10/10 on Monday.  Reports mood is \"I'm chill and calm\" but sleeping too much and feeling groggy in the daytime. Broke out in tears describing how she has been so tired at home that she couldn't get anything done. Reports trouble staying awake during group sessions. Reports swaying sensation when she was in bed yesterday. Denies dizziness or sensation of room spinning. Denies trouble falling asleep - \"the Seroquel knocks me out\". Took 25mg of Seroquel last night down from 50mg.    When asked about her CPAP, she denies using it due to sleep apnea. She requested whether it would be possible to get a sleep study as an inpatient however. She also used light therapy for 30 minutes yesterday. She requests to use it either before or after lunch instead of having to do it while she's eating. Feels like she is a burden to her friends and family but denies SI / HI. Denies paranoia, AH / VH.    RN report: Slept 9.5 hours, much less irritable, denies SI, felt better after meeting with Dr. Horne to discuss medications, had medication list printed, cord for her CPAP is missing  ROS: Endorses sleepiness and grogginess from medications. Denies changes in appetite      Vital Signs for the last 24 hours:  Temp:  [36.9 °C (98.4 °F)-37.1 °C (98.8 °F)] 37.1 °C (98.8 °F)  Heart Rate:  [71-92] 88  Resp:  [18] 18  BP: (106-141)/(66-71) 141/70    Labs:  No new labs.    Mental Status Exam:  Appearance:  woman, appears stated age, appropriately dressed in red longsleeve shirt and Tania-themed pants, well groomed  Gait and Motor: normal, no PMA/PMR  Behavior: Engaged, cooperative, maintained good eye contact  Speech: normal rate/rhythm/volume (improved from Tuesday)  Mood: \"I'm " "chill and calm\"  Affect: normal range, tearful at points  Associations: coherent  Thought Process: linear, logical, goal-directed   Thought Content: appropriate to situation, No AH/VH/delusions  Suicidality/Homicidality: denies  Judgement/Insight: good  Orientation: AAOx3    Assessment/Plan  Erika Vaughan is a 38 y.o. woman with a PMH of bipolar disorder and autism presenting with worsening anxiety and panic attacks.     She appears improved to me since I last saw her on Tuesday - her speech is more fluent with less pauses and despite being tearful during the interview she was able to laugh appropriately several times. She reports an improved mood since coming in on Monday. She reports feeling sedated and somnolent during the daytime, which she believes is due to the Seroquel. It is also possible she feels tired due to withdrawal from her ritalin, use of Ativan 3 times a day, and also due to lack of CPAP usage (given her obesity, she may have obstructive sleep apnea also contributing to her daytime sleepiness).    Anxiety and panic attacks  - Continue risperdal 0.5mg bid for mood stability  - Continue tegretol 200mg tid  - Taper paxil to 10mg bid (she has completed 20mg bid x 2 days) due to ineffectiveness  - Hold ritalin due to activation which could be contributing to panic attacks  - Light therapy before or after lunch, not during    Daytime sleepiness  - Seroquel reduced from 0.5mg to 0.25mg daily, monitor for sleepiness today  - Obtain CPAP machine and approval for use  - Consider reducing Ativan   - Inpatient sleep study if possible      Juan Jose Armando, MS3  [facility]  Psychiatry  Date: 12/6/2018              "

## 2018-12-06 NOTE — PLAN OF CARE
"Problem: Patient Care Overview (Adult)  Goal: Plan of Care Review  Outcome: Ongoing (interventions implemented as appropriate)   12/05/18 2038   Coping/Psychosocial   Plan Of Care Reviewed With patient   Coping/Psychosocial   Patient Agreement with Plan of Care agrees   Plan of Care Review   Progress improving   Outcome Summary Pt was visible on the unit and appropriate. She reported that she was very sleepy in the morning and was having a hard time waking up. She did tell me her med was lowered. She was med compliant. Rated her anxiety and depression a 6, states she is depressed about not feeling better. She denies SI      12/05/18 2038   Coping/Psychosocial   Plan Of Care Reviewed With patient   Coping/Psychosocial   Patient Agreement with Plan of Care agrees   Plan of Care Review   Progress improving   Outcome Summary Pt was visible on the unit and appropriate. She reported that she was very sleepy in the morning and was having a hard time waking up. She did tell me her med was lowered. She was med compliant. Rated her anxiety and depression a 6, states she is depressed about not feeling better. She denies SI and stated \"I have never gotten to the point where I was suicidal.\" She is eating well and continues to goes to groups. She had a visit from her mother, which she states went well. She is worried that she will not be able to distinguish when she is ready to leave. Staff will continue to monitor.        Problem: Mood Impairment (Anxiety Signs/Symptoms) (Adult)  Goal: Improved Mood Symptoms  Outcome: Ongoing (interventions implemented as appropriate)        "

## 2018-12-06 NOTE — PROGRESS NOTES
"Psychiatry Progress Note    Chief Complaint: \"My eyes feel heavy\"    Interval History:   RN report:She reported that she was very sleepy in the morning and was having a hard time waking up. She did tell me her med was lowered. She was med compliant. Rated her anxiety and depression a 6, states she is depressed about not feeling better. She denies SI.      Spoke with Erika this morning in her room with medical student present. She reports anxiety is 6/10 down from 10/10 on Monday.  Reports mood is \"I'm chill and calm\" but sleeping too much and feeling groggy in the daytime. Broke out in tears describing how she has been so tired at home that she couldn't get anything done. Reports trouble staying awake during group sessions. Reports swaying sensation when she was in bed yesterday. Denies dizziness or sensation of room spinning. Denies trouble falling asleep - \"the Seroquel knocks me out\". Took 25mg of Seroquel last night down from 50mg.     When asked about her CPAP, she denies using it due to sleep apnea. She requested whether it would be possible to get a sleep study as an inpatient however. She also used light therapy for 30 minutes yesterday. She requests to use it either before or after lunch instead of having to do it while she's eating. Feels like she is a burden to her friends and family but denies SI / HI. Denies paranoia, AH / VH.     ROS: Endorses feelings of fatigue and eye heaviness      Vital Signs for the last 24 hours:  Temp:  [36.9 °C (98.4 °F)-37.1 °C (98.8 °F)] 37.1 °C (98.8 °F)  Heart Rate:  [71-92] 88  Resp:  [18] 18  BP: (106-141)/(66-71) 141/70    Labs:  No new labs.    Mental Status Exam:  Appearance:  appears stated age, overweight  woman, dressed in red shirt and Tania pants  Gait and Motor: normal, no PMA/PMR  Behavior: calm and cooperative throughout interview  Speech: normal rate/rhythm/volume  Mood: \"currently 6/10, the higher the number the worse it is\"  Affect: somewhat " constrcited  Associations: coherent  Thought Process: goal-directed   Thought Content: appropriate to situation, No AH/VH/delusions  Suicidality/Homicidality: denies  Judgement/Insight: fair/limited  Orientation: AAOx3    Assessment/Plan  Mood disorder NOS  Cluster B Personality traits  Autism Spectrum disorder     Erika Vaughan is a 38 y.o.  woman with PMH of GERD, BPD, ASD who presented on 12/03/18 with cc of increased anxiety and depression for the past couple of months in context of social stressors, mainly her job. Patient has improved somewhat. She endorses some fatigue in context of recent discontinuation of her Ritalin. She appears depressed on exam and would likely benefit from antidepressant at this time.     Start Effexor 37.5 mg qDaily now  Continue to taper Paxil; 20 mg PO BID x 2 days; Paxil 10 mg PO BID 12/06/18  Continue Risperdal 0.5 mg PO BID  Continue Seroquel 25 mg PO qHS for mood stability   Consider tapering ativan to 0.5 mg BID   CPAP for possible obesity hypoventilation syndrome      Jr Hernandez MD  PGY-2 Psychiatry          History of cardiac catheterization    No significant past surgical history

## 2018-12-06 NOTE — PLAN OF CARE
"Problem: Patient Care Overview (Adult)  Goal: Plan of Care Review  Outcome: Ongoing (interventions implemented as appropriate)   12/06/18 1150   Coping/Psychosocial   Plan Of Care Reviewed With patient   Coping/Psychosocial   Patient Agreement with Plan of Care agrees   Plan of Care Review   Progress improving   Outcome Summary Patient less irritable and appears more relaxed on unit. Reports feeling less tired this am, but still feels that she is not \"rested\" although she reports sleeping the whole night. Participated in groups. denies si. Notified patient thtat her CPAP machine needs the power cord, so patient will notify mother to bring in. Once cord is available, will have unit checked by [employer]. More social. Affect remains restricted, but more relaxed and less anxious.         "

## 2018-12-07 PROCEDURE — 12400000 HC ROOM AND CARE SEMIPRIVATE PSYCH

## 2018-12-07 PROCEDURE — 99232 SBSQ HOSP IP/OBS MODERATE 35: CPT | Performed by: PSYCHIATRY & NEUROLOGY

## 2018-12-07 PROCEDURE — 63700000 HC SELF-ADMINISTRABLE DRUG: Performed by: PSYCHIATRY & NEUROLOGY

## 2018-12-07 PROCEDURE — 63700000 HC SELF-ADMINISTRABLE DRUG: Performed by: HOSPITALIST

## 2018-12-07 PROCEDURE — 63700000 HC SELF-ADMINISTRABLE DRUG: Performed by: STUDENT IN AN ORGANIZED HEALTH CARE EDUCATION/TRAINING PROGRAM

## 2018-12-07 RX ORDER — LORAZEPAM 0.5 MG/1
0.25 TABLET ORAL DAILY
Status: DISCONTINUED | OUTPATIENT
Start: 2018-12-09 | End: 2018-12-09

## 2018-12-07 RX ORDER — LORAZEPAM 0.5 MG/1
0.25 TABLET ORAL 2 TIMES DAILY
Status: DISCONTINUED | OUTPATIENT
Start: 2018-12-07 | End: 2018-12-08

## 2018-12-07 RX ADMIN — RISPERIDONE 0.5 MG: 0.5 TABLET, ORALLY DISINTEGRATING ORAL at 09:10

## 2018-12-07 RX ADMIN — LORATADINE 10 MG: 10 TABLET ORAL at 09:10

## 2018-12-07 RX ADMIN — FAMOTIDINE 20 MG: 20 TABLET, FILM COATED ORAL at 09:10

## 2018-12-07 RX ADMIN — LORAZEPAM 0.5 MG: 0.5 TABLET ORAL at 09:10

## 2018-12-07 RX ADMIN — PAROXETINE HYDROCHLORIDE 10 MG: 10 TABLET, FILM COATED ORAL at 17:11

## 2018-12-07 RX ADMIN — PAROXETINE HYDROCHLORIDE 10 MG: 10 TABLET, FILM COATED ORAL at 09:10

## 2018-12-07 RX ADMIN — FAMOTIDINE 20 MG: 20 TABLET, FILM COATED ORAL at 17:11

## 2018-12-07 RX ADMIN — RISPERIDONE 0.5 MG: 0.5 TABLET, ORALLY DISINTEGRATING ORAL at 17:12

## 2018-12-07 RX ADMIN — CARBAMAZEPINE 200 MG: 200 TABLET ORAL at 12:50

## 2018-12-07 RX ADMIN — VENLAFAXINE HYDROCHLORIDE 37.5 MG: 37.5 CAPSULE, EXTENDED RELEASE ORAL at 09:10

## 2018-12-07 RX ADMIN — CARBAMAZEPINE 200 MG: 200 TABLET ORAL at 09:10

## 2018-12-07 RX ADMIN — LORAZEPAM 0.25 MG: 0.5 TABLET ORAL at 17:12

## 2018-12-07 RX ADMIN — QUETIAPINE 25 MG: 50 TABLET, FILM COATED ORAL at 23:02

## 2018-12-07 RX ADMIN — CARBAMAZEPINE 200 MG: 200 TABLET ORAL at 17:11

## 2018-12-07 NOTE — PLAN OF CARE
"Problem: Patient Care Overview (Adult)  Goal: Plan of Care Review  Outcome: Ongoing (interventions implemented as appropriate)   12/07/18 2666   Coping/Psychosocial   Plan Of Care Reviewed With patient   Coping/Psychosocial   Patient Agreement with Plan of Care agrees   Plan of Care Review   Progress improving   Outcome Summary pt is visible on unit, bright, social with peers and staff. reports excitement for holiday season. parents visited off hours this shift per doctor's order. med compliant, reports poor sleep due to adjusting to using CPAP, glad to have moved into a single room. continues to endorse depression and anxiety but improved since admission. insight improving, states \"this is where i need to be\". no sedation noted this shift. pt denies SI/HI. ADLs and appetite good. staff will continue to monitor and encourage positive coping skills.        Problem: Mood Impairment (Anxiety Signs/Symptoms) (Adult)  Goal: Improved Mood Symptoms  Outcome: Ongoing (interventions implemented as appropriate)        "

## 2018-12-07 NOTE — PLAN OF CARE
Problem: Overarching Goals (Adult)  Goal: Optimized Coping Skills in Response to Life Stressors    Intervention: Promote Effective Coping Strategies   12/07/18 1310   Coping/Psychosocial Interventions   Supportive Measures active listening utilized;self-reflection promoted;counseling provided       Goal: Develops/Participates in [place]rapeutic [place] to Support Successful Transition    Intervention: Foster [place]rapeutic [place]   12/07/18 1310   Psychosocial Support   Trust Relationship/Rapport care explained;empathic listening provided;emotional support provided     Intervention: Mutually Develop Transition Plan   12/07/18 1310   Mutually Develop Transition Plan   Transition Support community resources reviewed

## 2018-12-07 NOTE — PROGRESS NOTES
"Psychiatry Progress Note    Chief Complaint: \"I couldn't really sleep last night.\"     Interval History:   RN report: Pt was visible on the unit and appropriate. She reported that she was very sleepy in the morning and was having a hard time waking up. She did tell me her med was lowered. She was med compliant. Rated her anxiety and depression a 6, states she is depressed about not feeling better. She denies SI      Spoke with Erika while she was in the hallway. States that she had trouble sleeping last night due to her CPAP machine. She denies any other acute complaints. She denies any side effects from her medication.          ROS: Endorses     Vital Signs for the last 24 hours:  Temp:  [36.6 °C (97.8 °F)-37 °C (98.6 °F)] 36.6 °C (97.8 °F)  Heart Rate:  [83-93] 88  Resp:  [16-20] 16  BP: (118-134)/(58-87) 118/58    Labs:  No new labs.    Mental Status Exam:  Appearance:  appears stated age, overweight  woma  Gait and Motor: normal, no PMA/PMR  Behavior: calm and cooperative throughout interview  Speech: normal rate/rhythm/volume  Mood: \"5/10\"  Affect: somewhat constrcited  Associations: coherent  Thought Process: goal-directed   Thought Content: appropriate to situation, No AH/VH/delusions  Suicidality/Homicidality: denies  Judgement/Insight: fair/limited  Orientation: AAOx3    Assessment/Plan  Mood disorder NOS  Cluster B Personality traits  Autism Spectrum disorder     Erika Vaughan is a 38 y.o.  woman with PMH of GERD, BPD, ASD who presented on 12/03/18 with cc of increased anxiety and depression for the past couple of months in context of social stressors, mainly her job. Patient continues to improve. Will continue medication regimen as directed below.    Start Effexor 37.5 mg qDaily now  Continue to taper Paxil; 20 mg PO BID x 2 days; Paxil 10 mg PO BID 12/06/18 stop today  Continue Risperdal 0.5 mg PO BID  Continue Seroquel 25 mg PO qHS for mood stability   Continue  ativan taper, now 0.25 mg BID "   CPAP for possible obesity hypoventilation syndrome      Jr Hernandez MD  PGY-2 Psychiatry

## 2018-12-07 NOTE — PLAN OF CARE
Problem: Patient Care Overview (Adult)  Goal: Plan of Care Review  Outcome: Ongoing (interventions implemented as appropriate)   12/06/18 1949   Coping/Psychosocial   Plan Of Care Reviewed With patient   Coping/Psychosocial   Patient Agreement with Plan of Care agrees   Plan of Care Review   Progress improving   Outcome Summary Pt was visible on the unit and mroe social with her peers. She was not irritable this shift, and she was med compliant. She alerted me that she was anxious about al the med changes that are occurring. Pt had a visit from her behavioral specialist Meliza Viera, which she states went well. Pt also expressed the fear that she will be rushed into dishcarge. Continues to state that she felt tired and groggy with the decrease in HS med. Had a visit from her mother who brought her CPAP cord, and she will be on a CPAP tonight. Denies SI. Staff will continue to monitor.        Problem: Mood Impairment (Anxiety Signs/Symptoms) (Adult)  Goal: Improved Mood Symptoms  Outcome: Ongoing (interventions implemented as appropriate)

## 2018-12-07 NOTE — PLAN OF CARE
Problem: Patient Care Overview (Adult)  Goal: Plan of Care Review  Outcome: Ongoing (interventions implemented as appropriate)   12/07/18 1302   Coping/Psychosocial   Plan Of Care Reviewed With patient   Coping/Psychosocial   Patient Agreement with Plan of Care agrees   Plan of Care Review   Progress improving   Outcome Summary Less anxious, more animated and social. More engaged with peers and staff. Compliant with medications and attending and participating in groups. Does not appear sedated; appears to have normal level of activity. Denies si/hi. Appears less anxious and reports improving mood. Good ADL's

## 2018-12-07 NOTE — PROGRESS NOTES
"Psychiatry Progress Note     Chief Complaint: \"I couldn't really sleep last night.\"      Interval History:   RN report: Pt was visible on the unit and appropriate. She reported that she was very sleepy in the morning and was having a hard time waking up. She did tell me her med was lowered. She was med compliant. Rated her anxiety and depression a 6, states she is depressed about not feeling better. She denies SI        Spoke with Erika while she was in the hallway.            ROS: Endorses      Vital Signs for the last 24 hours:  Temp:  [36.6 °C (97.8 °F)-37 °C (98.6 °F)] 36.6 °C (97.8 °F)  Heart Rate:  [83-93] 88  Resp:  [16-20] 16  BP: (118-134)/(58-87) 118/58     Labs:  No new labs.     Mental Status Exam:  Appearance:  appears stated age, overweight  woman, dressed in red shirt and Tania pants  Gait and Motor: normal, no PMA/PMR  Behavior: calm and cooperative throughout interview  Speech: normal rate/rhythm/volume  Mood: \"currently 6/10, the higher the number the worse it is\"  Affect: somewhat constrcited  Associations: coherent  Thought Process: goal-directed   Thought Content: appropriate to situation, No AH/VH/delusions  Suicidality/Homicidality: denies  Judgement/Insight: fair/limited  Orientation: AAOx3     Assessment/Plan  Mood disorder NOS  Cluster B Personality traits  Autism Spectrum disorder     Erika Vaughan is a 38 y.o.  woman with PMH of GERD, BPD, ASD who presented on 12/03/18 with cc of increased anxiety and depression for the past couple of months in context of social stressors, mainly her job. Patient has improved somewhat. She endorses some fatigue in context of recent discontinuation of her Ritalin. She appears depressed on exam and would likely benefit from antidepressant at this time.      Start Effexor 37.5 mg qDaily now  Continue to taper Paxil; 20 mg PO BID x 2 days; Paxil 10 mg PO BID 12/06/18, then d/c  Continue Risperdal 0.5 mg PO BID  Continue Seroquel 25 mg PO qHS for " mood stability   Consider tapering ativan to 0.25 mg BID, then daily, then d/c  CPAP for possible obesity hypoventilation syndrome        Jr Hernandez MD  PGY-2 Psychiatry      Patient seen and evaluated, agree with note as written.  We will continue to titrate down Ativan to discontinue.  Would recommend continuing Risperdal, to increase only if worsening anxiety develops.  Would continue Seroquel at present time and only increase if the patient develops insomnia after Ativan is discontinued.  The patient overall states that her condition is improving from preadmission.   Edited and reviewed by Ga Horne MD, attending Psychiaty

## 2018-12-08 PROCEDURE — 63700000 HC SELF-ADMINISTRABLE DRUG: Performed by: PSYCHIATRY & NEUROLOGY

## 2018-12-08 PROCEDURE — 12400000 HC ROOM AND CARE SEMIPRIVATE PSYCH

## 2018-12-08 PROCEDURE — 63700000 HC SELF-ADMINISTRABLE DRUG: Performed by: STUDENT IN AN ORGANIZED HEALTH CARE EDUCATION/TRAINING PROGRAM

## 2018-12-08 PROCEDURE — 63700000 HC SELF-ADMINISTRABLE DRUG: Performed by: HOSPITALIST

## 2018-12-08 PROCEDURE — 99232 SBSQ HOSP IP/OBS MODERATE 35: CPT | Performed by: PSYCHIATRY & NEUROLOGY

## 2018-12-08 RX ORDER — QUETIAPINE FUMARATE 50 MG/1
50 TABLET, FILM COATED ORAL NIGHTLY PRN
Status: DISCONTINUED | OUTPATIENT
Start: 2018-12-08 | End: 2018-12-13 | Stop reason: HOSPADM

## 2018-12-08 RX ADMIN — RISPERIDONE 0.5 MG: 0.5 TABLET, ORALLY DISINTEGRATING ORAL at 08:45

## 2018-12-08 RX ADMIN — CARBAMAZEPINE 200 MG: 200 TABLET ORAL at 12:07

## 2018-12-08 RX ADMIN — VENLAFAXINE HYDROCHLORIDE 37.5 MG: 37.5 CAPSULE, EXTENDED RELEASE ORAL at 08:48

## 2018-12-08 RX ADMIN — CARBAMAZEPINE 200 MG: 200 TABLET ORAL at 17:30

## 2018-12-08 RX ADMIN — CARBAMAZEPINE 200 MG: 200 TABLET ORAL at 08:47

## 2018-12-08 RX ADMIN — LORATADINE 10 MG: 10 TABLET ORAL at 08:46

## 2018-12-08 RX ADMIN — LORAZEPAM 0.25 MG: 0.5 TABLET ORAL at 08:49

## 2018-12-08 RX ADMIN — LORAZEPAM 1 MG: 1 TABLET ORAL at 03:42

## 2018-12-08 RX ADMIN — FAMOTIDINE 20 MG: 20 TABLET, FILM COATED ORAL at 17:30

## 2018-12-08 RX ADMIN — QUETIAPINE 50 MG: 50 TABLET, FILM COATED ORAL at 20:57

## 2018-12-08 RX ADMIN — RISPERIDONE 0.5 MG: 0.5 TABLET, ORALLY DISINTEGRATING ORAL at 17:31

## 2018-12-08 RX ADMIN — FAMOTIDINE 20 MG: 20 TABLET, FILM COATED ORAL at 08:47

## 2018-12-08 RX ADMIN — PAROXETINE HYDROCHLORIDE 10 MG: 10 TABLET, FILM COATED ORAL at 08:47

## 2018-12-08 NOTE — PLAN OF CARE
Problem: Patient Care Overview (Adult)  Goal: Plan of Care Review  Outcome: Ongoing (interventions implemented as appropriate)   12/08/18 0248   Coping/Psychosocial   Plan Of Care Reviewed With patient   Coping/Psychosocial   Patient Agreement with Plan of Care agrees   Plan of Care Review   Progress improving   Outcome Summary reports continued difficulty sleeping and feeling frustrated because of that, reports that her mood is irritated, but she thinks that is because she not sleeping well, wanted to make sure that her ativan doses were being tapered she got 0.25mg this morning and then it was discontiued this afternoon seroquel was added for tonight, she did her light therapy, was med compliant, says she gets easily nauseated from food and meds but did not req anything prn, she asked to have her CPAP placed before 10pm

## 2018-12-08 NOTE — PROGRESS NOTES
"Psychiatry Progress Note     Chief Complaint: \"I feel terrible.\"      Interval History:   RN report: pt is visible on unit, bright, social with peers and staff. reports excitement for holiday season. parents visited off hours this shift per doctor's order. med compliant, reports poor sleep due to adjusting to using CPAP, glad to have moved into a single room. continues to endorse depression and anxiety but improved since admission. insight improving, states \"this is where i need to be\". no sedation noted this shift. pt denies SI/HI. ADLs and appetite good. staff will continue to monitor and encourage positive coping skills.         Patient reports that she attempted to stay up till 11 pm last night but was unable to. She fell asleep around 9 pm but was awakened by tech for CPAP placement. She was able to go back to sleep, but woke up again around 3:15 am and was unable to go back to sleep. She feels that her mood is worse given that she hasn't slept well for the past 2 nights. Asks for her medication to be changed as she wants to be awake when her family visits today and wants to be able to have a good night's sleep.         ROS: poor sleep     Vital Signs for the last 24 hours:  Temp:  [36.6 °C (97.8 °F)-37 °C (98.6 °F)] 36.6 °C (97.8 °F)  Heart Rate:  [83-93] 88  Resp:  [16-20] 16  BP: (118-134)/(58-87) 118/58     Labs:  No new labs.     Mental Status Exam:  Appearance:  appears stated age, overweight  woman, dressed in casual clother  Gait and Motor: normal, no PMA/PMR  Behavior: calm and cooperative throughout interview  Speech: normal rate/rhythm/volume  Mood: \"I feel worse\"  Affect: somewhat constrcited  Associations: coherent  Thought Process: goal-directed   Thought Content: appropriate to situation, No AH/VH/delusions  Suicidality/Homicidality: denies  Judgement/Insight: fair/fair  Orientation: AAOx3     Assessment/Plan  Mood disorder NOS  Cluster B Personality traits  Autism Spectrum " disorder     Erika Vaughan is a 38 y.o.  woman with PMH of GERD, BPD, ASD who presented on 12/03/18 with cc of increased anxiety and depression for the past couple of months in context of social stressors, mainly her job. Patient is having difficulty with sleep currently. Will adjust medication regimen to help with insomnia. Will continue Effexor and Risperdal at current dose to help with mood stabilization.      Continue Effexor 37.5 mg qDaily   Continue Risperdal 0.5 mg PO BID  Increase Seroquel to 50 mg PO qHS for mood stability   Tapering ativan to 0.25 daily, then d/c  CPAP for possible obesity hypoventilation syndrome        Jr Hernandez MD  PGY-2 Psychiatry

## 2018-12-09 PROCEDURE — 63700000 HC SELF-ADMINISTRABLE DRUG: Performed by: PSYCHIATRY & NEUROLOGY

## 2018-12-09 PROCEDURE — 63700000 HC SELF-ADMINISTRABLE DRUG: Performed by: STUDENT IN AN ORGANIZED HEALTH CARE EDUCATION/TRAINING PROGRAM

## 2018-12-09 PROCEDURE — 63700000 HC SELF-ADMINISTRABLE DRUG: Performed by: HOSPITALIST

## 2018-12-09 PROCEDURE — 12400000 HC ROOM AND CARE SEMIPRIVATE PSYCH

## 2018-12-09 PROCEDURE — 99232 SBSQ HOSP IP/OBS MODERATE 35: CPT | Performed by: PSYCHIATRY & NEUROLOGY

## 2018-12-09 RX ORDER — TRAZODONE HYDROCHLORIDE 50 MG/1
25 TABLET ORAL NIGHTLY
Status: DISCONTINUED | OUTPATIENT
Start: 2018-12-09 | End: 2018-12-10

## 2018-12-09 RX ORDER — RISPERIDONE 0.5 MG/1
1 TABLET, ORALLY DISINTEGRATING ORAL NIGHTLY
Status: DISCONTINUED | OUTPATIENT
Start: 2018-12-09 | End: 2018-12-10

## 2018-12-09 RX ADMIN — CARBAMAZEPINE 200 MG: 200 TABLET ORAL at 08:02

## 2018-12-09 RX ADMIN — FAMOTIDINE 20 MG: 20 TABLET, FILM COATED ORAL at 08:02

## 2018-12-09 RX ADMIN — CARBAMAZEPINE 200 MG: 200 TABLET ORAL at 17:14

## 2018-12-09 RX ADMIN — RISPERIDONE 0.5 MG: 0.5 TABLET, ORALLY DISINTEGRATING ORAL at 08:03

## 2018-12-09 RX ADMIN — LORATADINE 10 MG: 10 TABLET ORAL at 08:02

## 2018-12-09 RX ADMIN — CARBAMAZEPINE 200 MG: 200 TABLET ORAL at 12:13

## 2018-12-09 RX ADMIN — ACETAMINOPHEN 650 MG: 325 TABLET, FILM COATED ORAL at 15:01

## 2018-12-09 RX ADMIN — QUETIAPINE 50 MG: 50 TABLET, FILM COATED ORAL at 21:44

## 2018-12-09 RX ADMIN — FAMOTIDINE 20 MG: 20 TABLET, FILM COATED ORAL at 17:14

## 2018-12-09 RX ADMIN — TRAZODONE HYDROCHLORIDE 25 MG: 50 TABLET ORAL at 21:43

## 2018-12-09 RX ADMIN — VENLAFAXINE HYDROCHLORIDE 37.5 MG: 37.5 CAPSULE, EXTENDED RELEASE ORAL at 08:02

## 2018-12-09 RX ADMIN — DIPHENHYDRAMINE HYDROCHLORIDE 25 MG: 25 CAPSULE ORAL at 02:25

## 2018-12-09 RX ADMIN — IBUPROFEN 400 MG: 400 TABLET ORAL at 02:25

## 2018-12-09 RX ADMIN — RISPERIDONE 1 MG: 0.5 TABLET, ORALLY DISINTEGRATING ORAL at 21:44

## 2018-12-09 NOTE — PROGRESS NOTES
"Psychiatry Progress Note    Chief Complaint/Reason for follow-up: Mood disorder, Autism spectrum disorder    Interval History: Pt seen and examined. Records reviewed. Pt slept poorly again last night. Declined morning Ativan due to c/f oversedation.     Subjective: Pt seen sitting in milieu watching tv, appearing increasingly distressed on approach. Pt reports increased anxiety. She is concerned that she is not getting better. Says insomnia is about \"the same\" as it was prior to admission. However prolonged insomnia is frustrating to her. Pt intermittently tearful. Had another problem with CPAP machine last night again. Tech believed she was missing a piece. Nurse Rebeca arranged for pt to use hospital CPAP tonight. Pt took Ativan, Benadryl and Seroquel last night with little effect. She does not believe she responds well to ativan. Pt looking forward to outdoor group, \"maybe I will feel better with some fresh air.\"    Review of Systems:   see above    Vital Signs for the last 24 hours:  Temp:  [36.8 °C (98.2 °F)-36.9 °C (98.5 °F)] 36.8 °C (98.2 °F)  Heart Rate:  [] 79  Resp:  [18-20] 20  BP: (118-135)/(55-66) 121/66    Labs:  No new labs.    Mental Status Exam:  Appearance:  appears stated age, overweight, dressed in casual clothes, wearing dark framed glasses  Gait and Motor: normal, no PMA/PMR  Behavior:cooperative throughout interview  Speech: normal rate/rhythm/volume  Mood: \"I feel worse\"  Affect: labile; anxious, frustrated, smiling towards end of interview  Associations: coherent  Thought Process: goal-directed   Thought Content: appropriate to situation, No AH/VH/delusions  Suicidality/Homicidality: denies  Judgement/Insight: fair/fair  Orientation: AAOx3      Assessment/Plan  Mood disorder NOS  Autism Spectrum disorder     38 year old woman with ASD presenting with worsening mood ico of social stressors. Pt currently focused on poor sleep, which is contributing to increase anxiety. Remains in control of " her behavior and able to make needs known. Also having issues with CPAP machine.     -Continue Effexor 37.5 mg qDaily   -Change risperdal to 1mg qhs tonight. Increase as needed.  -d/c standing and prn ativan d/t concern for activation  -d/c benadryl d/t concern for activation  -start trazodone 25mg qhs for insomnia  -ct Seroquel to 50 mg PO qHS PRN for mood stability   -CPAP for possible obesity hypoventilation syndrome

## 2018-12-09 NOTE — PLAN OF CARE
"Problem: Patient Care Overview (Adult)  Goal: Plan of Care Review  Outcome: Ongoing (interventions implemented as appropriate)   12/09/18 1142   Coping/Psychosocial   Plan Of Care Reviewed With patient   Coping/Psychosocial   Patient Agreement with Plan of Care agrees   Plan of Care Review   Progress improving   Outcome Summary Pt declined am dose of Ativan and will discuss with MD, \"I slept poorly, I need medications to help me sleep, anxiety is 7, depression is 7, which are both down from above 10 initially upon admission, not suicidal, I showered this am, I will have trouble staying awake if I take am Ativan, I want to sleep at night not in the day.\" Per pts request RN spoke to respiratory to try to make pts CPAP mask comfortable or to use hospital CPAP tonight. Pt shaved with supervision with staff. Resp  will meet with her to problem solve. Maintenance was contacted to repair night light       Problem: Mood Impairment (Anxiety Signs/Symptoms) (Adult)  Intervention: Manage Emotion/Temperament/Mood   12/09/18 1142   Manage Emotion/Temperament/Mood   Mutually Determined Action Steps (Manage Emotion/Temperament/Mood) names internal triggers   Individualized Action Step (Manage Emotion/Temperament/Mood) Pt will tell staff a number fpor anxiety and depression this shift with 10 the worst, and will report any suicidal thoughts       Goal: Improved Mood Symptoms  Outcome: Ongoing (interventions implemented as appropriate)        "

## 2018-12-10 PROCEDURE — 63700000 HC SELF-ADMINISTRABLE DRUG: Performed by: PSYCHIATRY & NEUROLOGY

## 2018-12-10 PROCEDURE — 94660 CPAP INITIATION&MGMT: CPT

## 2018-12-10 PROCEDURE — 63700000 HC SELF-ADMINISTRABLE DRUG: Performed by: HOSPITALIST

## 2018-12-10 PROCEDURE — 12400000 HC ROOM AND CARE SEMIPRIVATE PSYCH

## 2018-12-10 PROCEDURE — 63700000 HC SELF-ADMINISTRABLE DRUG: Performed by: STUDENT IN AN ORGANIZED HEALTH CARE EDUCATION/TRAINING PROGRAM

## 2018-12-10 RX ORDER — RISPERIDONE 0.5 MG/1
1.5 TABLET, ORALLY DISINTEGRATING ORAL NIGHTLY
Status: DISCONTINUED | OUTPATIENT
Start: 2018-12-10 | End: 2018-12-13 | Stop reason: HOSPADM

## 2018-12-10 RX ORDER — CYANOCOBALAMIN (VITAMIN B-12) 500 MCG
1 TABLET ORAL NIGHTLY
Status: DISCONTINUED | OUTPATIENT
Start: 2018-12-10 | End: 2018-12-13 | Stop reason: HOSPADM

## 2018-12-10 RX ORDER — VENLAFAXINE HYDROCHLORIDE 75 MG/1
75 CAPSULE, EXTENDED RELEASE ORAL
Status: DISCONTINUED | OUTPATIENT
Start: 2018-12-11 | End: 2018-12-11

## 2018-12-10 RX ADMIN — CARBAMAZEPINE 200 MG: 200 TABLET ORAL at 11:57

## 2018-12-10 RX ADMIN — VENLAFAXINE HYDROCHLORIDE 37.5 MG: 37.5 CAPSULE, EXTENDED RELEASE ORAL at 07:58

## 2018-12-10 RX ADMIN — FAMOTIDINE 20 MG: 20 TABLET, FILM COATED ORAL at 07:58

## 2018-12-10 RX ADMIN — IBUPROFEN 400 MG: 400 TABLET ORAL at 07:58

## 2018-12-10 RX ADMIN — Medication 1 MG: at 19:18

## 2018-12-10 RX ADMIN — DEXTRAN, HYPROMELLOSE 1 DROP: 1; 3 SOLUTION/ DROPS OPHTHALMIC at 14:12

## 2018-12-10 RX ADMIN — QUETIAPINE 50 MG: 50 TABLET, FILM COATED ORAL at 23:55

## 2018-12-10 RX ADMIN — LORATADINE 10 MG: 10 TABLET ORAL at 07:58

## 2018-12-10 RX ADMIN — FAMOTIDINE 20 MG: 20 TABLET, FILM COATED ORAL at 17:29

## 2018-12-10 RX ADMIN — CARBAMAZEPINE 200 MG: 200 TABLET ORAL at 17:29

## 2018-12-10 RX ADMIN — RISPERIDONE 1.5 MG: 0.5 TABLET, ORALLY DISINTEGRATING ORAL at 23:02

## 2018-12-10 RX ADMIN — ACETAMINOPHEN 650 MG: 325 TABLET, FILM COATED ORAL at 04:30

## 2018-12-10 RX ADMIN — ACETAMINOPHEN 650 MG: 325 TABLET, FILM COATED ORAL at 23:03

## 2018-12-10 RX ADMIN — CARBAMAZEPINE 200 MG: 200 TABLET ORAL at 07:58

## 2018-12-10 RX ADMIN — SALINE NASAL SPRAY 1 SPRAY: 1.5 SOLUTION NASAL at 14:11

## 2018-12-10 NOTE — PLAN OF CARE
Problem: Overarching Goals (Adult)  Goal: Optimized Coping Skills in Response to Life Stressors    Intervention: Promote Effective Coping Strategies   12/10/18 1244   Coping/Psychosocial Interventions   Supportive Measures active listening utilized;self-reflection promoted;counseling provided       Goal: Develops/Participates in Therapeutic [place] to Support Successful Transition    Intervention: Foster Therapeutic [place]   12/10/18 1244   Psychosocial Support   Trust Relationship/Rapport care explained;empathic listening provided;emotional support provided     Intervention: Mutually Develop Transition Plan   12/10/18 1244   Mutually Develop Transition Plan   Transition Support community resources reviewed  ([facility])

## 2018-12-10 NOTE — PLAN OF CARE
Problem: Patient Care Overview (Adult)  Goal: Plan of Care Review  Outcome: Ongoing (interventions implemented as appropriate)   12/09/18 9247   Coping/Psychosocial   Plan Of Care Reviewed With patient   Coping/Psychosocial   Patient Agreement with Plan of Care agrees   Plan of Care Review   Progress progress toward functional goals as expected   Outcome Summary Erika was very anxious this shift, perseverating on lack of sleep, med changes and c-pap. Spoke at length about a plan for medication time and wanting to stay awake this evening in order to sleep tonight. Became tearful at times. Continues to talk about not understading her medications, talking about not getting her stimulant while in the hospital. Encouraged the pt to stay in the present and not dwell on past medication regimens.

## 2018-12-10 NOTE — PROGRESS NOTES
"Psychiatry Progress Note     Chief Complaint: \"I think I'm making progress.\"      Interval History:   No acute events OVN    RN report:Erika was very anxious this shift, perseverating on lack of sleep, med changes and c-pap. Spoke at length about a plan for medication time and wanting to stay awake this evening in order to sleep tonight. Became tearful at times. Continues to talk about not understading her medications, talking about not getting her stimulant while in the hospital. Encouraged the pt to stay in the present and not dwell on past medication regimens.      Patient reports that she slept somewhat better last night around 9:30 pm after receiving a combination of seroquel 50 mg, trazodone 25 mg and risperdal 1 mg. She states that she was awakened around midnight by a tech to have her CPAP placed. She was able to fall back asleep, but thinks that the recurrent interruptions by techs are causing her to sleep less and is impeding her progress. She requests that her trazodone be discontinued, because \"I'm very sensitive to medication.\" Discussed starting low-dose melatonin as she reports difficulty with sleep initiation.        ROS: poor sleep     Vital Signs for the last 24 hours:  Temp:  [36.6 °C (97.8 °F)-37 °C (98.6 °F)] 36.6 °C (97.8 °F)  Heart Rate:  [83-93] 88  Resp:  [16-20] 16  BP: (118-134)/(58-87) 118/58     Labs:  No new labs.     Mental Status Exam:  Appearance:  appears stated age, overweight  woman, dressed in red shirt and pants  Gait and Motor: normal, no PMA/PMR  Behavior: calm and cooperative throughout interview  Speech: normal rate/rhythm/volume  Mood: \"I can't put a number on it, but it's better.\"  Affect: somewhat constrcited  Associations: coherent  Thought Process: goal-directed   Thought Content: appropriate to situation, No AH/VH/delusions  Suicidality/Homicidality: denies  Judgement/Insight: fair/fair  Orientation: AAOx3     Assessment/Plan  Mood disorder NOS  Cluster B " Personality traits  Autism Spectrum disorder     Erika Vaughan is a 38 y.o.  woman with PMH of GERD, BPD, ASD who presented on 12/03/18 with cc of increased anxiety and depression for the past couple of months in context of social stressors, mainly her job. Patient conitnues difficulty with sleep currently. Will increase Effexor and continue Risperdal at current dose to help with mood stabilization.      Increase Effexor 75 mg PO qDaily   Start melatonin 1 mg PO q1900  Continue Risperdal 1 mg PO qHS  Continue Seroquel to 50 mg PO qHS prn for insomnia/mood stability   Discontinue trazodone 25 mg PO qHS  Ativan discontinued   CPAP for possible obesity hypoventilation syndrome        Jr Hernandez MD  PGY-2 Psychiatry

## 2018-12-10 NOTE — PLAN OF CARE
Problem: Patient Care Overview (Adult)  Goal: Plan of Care Review  Outcome: Ongoing (interventions implemented as appropriate)   12/10/18 1133   Coping/Psychosocial   Plan Of Care Reviewed With patient   Coping/Psychosocial   Patient Agreement with Plan of Care agrees   Plan of Care Review   Progress improving   Outcome Summary Erika reports that she slept about 4 hours with her CPAP, but started having panic attacks, so removed the mask.  Reported some c/o nasal stuffiness and headache, received ibuprofen 400mg, with good results. Otherwise, reports improvement in depressed mood.  Denies thoughts of self-harm or wanting to die. Appears less irritable and more social and comfortable with peers in community.

## 2018-12-11 PROCEDURE — 12400000 HC ROOM AND CARE SEMIPRIVATE PSYCH

## 2018-12-11 PROCEDURE — 63700000 HC SELF-ADMINISTRABLE DRUG: Performed by: PSYCHIATRY & NEUROLOGY

## 2018-12-11 PROCEDURE — 63700000 HC SELF-ADMINISTRABLE DRUG: Performed by: STUDENT IN AN ORGANIZED HEALTH CARE EDUCATION/TRAINING PROGRAM

## 2018-12-11 PROCEDURE — 63700000 HC SELF-ADMINISTRABLE DRUG: Performed by: HOSPITALIST

## 2018-12-11 PROCEDURE — 99233 SBSQ HOSP IP/OBS HIGH 50: CPT | Performed by: PSYCHIATRY & NEUROLOGY

## 2018-12-11 RX ORDER — VENLAFAXINE HYDROCHLORIDE 37.5 MG/1
37.5 CAPSULE, EXTENDED RELEASE ORAL
Status: DISCONTINUED | OUTPATIENT
Start: 2018-12-12 | End: 2018-12-13 | Stop reason: HOSPADM

## 2018-12-11 RX ADMIN — CARBAMAZEPINE 200 MG: 200 TABLET ORAL at 17:06

## 2018-12-11 RX ADMIN — LORATADINE 10 MG: 10 TABLET ORAL at 08:03

## 2018-12-11 RX ADMIN — CARBAMAZEPINE 200 MG: 200 TABLET ORAL at 08:03

## 2018-12-11 RX ADMIN — RISPERIDONE 1.5 MG: 0.5 TABLET, ORALLY DISINTEGRATING ORAL at 21:22

## 2018-12-11 RX ADMIN — FAMOTIDINE 20 MG: 20 TABLET, FILM COATED ORAL at 17:06

## 2018-12-11 RX ADMIN — Medication 1 MG: at 19:10

## 2018-12-11 RX ADMIN — CARBAMAZEPINE 200 MG: 200 TABLET ORAL at 12:02

## 2018-12-11 RX ADMIN — QUETIAPINE 50 MG: 50 TABLET, FILM COATED ORAL at 21:23

## 2018-12-11 RX ADMIN — FAMOTIDINE 20 MG: 20 TABLET, FILM COATED ORAL at 08:03

## 2018-12-11 RX ADMIN — VENLAFAXINE HYDROCHLORIDE 75 MG: 75 CAPSULE, EXTENDED RELEASE ORAL at 08:03

## 2018-12-11 NOTE — NURSING NOTE
Patient up at start of shift talking with Respiratory Therapist, c/o mask on C-pap and didn't want to wear it. Patient was switched to her own machine which she wore off and on before taking it off for night at 0415. Pulse ox went between 95-99%. Had no problems breathing or sleeping off C-pap.

## 2018-12-11 NOTE — PROGRESS NOTES
"Psychiatry Progress Note     Chief Complaint: Anxiety     Interval History:   No acute events OVN    RN report:Erika was very anxious this shift, perseverating on lack of sleep, med changes and c-pap. Spoke at length about a plan for medication time and wanting to stay awake this evening in order to sleep tonight. Became tearful at times. Continues to talk about not understading her medications, talking about not getting her stimulant while in the hospital. Encouraged the pt to stay in the present and not dwell on past medication regimens.      Patient reports trouble sleeping due to CPAP issues again. Had a small anxiety attack after being \"triggered\" by something the respiratory therapist said to her, but apologized and was given Seroquel 50mg and fell asleep well afterwards. Would like to stop using the CPAP while inpatient, but willing to try once at home. Continued improvement of mood and anxiety overall. She feels more focused after ativan was discontinued. Daytime fatigue still present but better than before. Denies changes in appetite. Denies SI.     ROS: poor sleep     Vital Signs for the last 24 hours:  Temp:  [36.6 °C (97.8 °F)-37 °C (98.6 °F)] 36.6 °C (97.8 °F)  Heart Rate:  [83-93] 88  Resp:  [16-20] 16  BP: (118-134)/(58-87) 118/58     Labs:  No new labs.     Mental Status Exam:  Appearance:  appears stated age, overweight  woman, dressed in red shirt and pants  Gait and Motor: normal, no PMA/PMR  Behavior: calm and cooperative throughout interview  Speech: normal rate/rhythm/volume  Mood: \"I feel very tired today.\"  Affect: somewhat constrcited  Associations: coherent  Thought Process: goal-directed   Thought Content: appropriate to situation, No AH/VH/delusions  Suicidality/Homicidality: denies  Judgement/Insight: fair/fair  Orientation: AAOx3     Assessment/Plan  Mood disorder NOS  Cluster B Personality traits  Autism Spectrum disorder     Erika Vaughan is a 38 y.o.  woman with PMH of GERD, " BPD, ASD who presented on 12/03/18 with cc of increased anxiety and depression for the past couple of months in context of social stressors, mainly her job. Patient conitnues difficulty with sleep currently. Discussed preference to have CPAP on at night, but patient refuses to use while here. She states that she plans on resuming use when at home. Will monitor for changes without CPAP machine and while on medication regimen listed below.      Decrease Effexor 37.5 mg PO qDaily   Continue melatonin 1 mg PO q1900  Continue Risperdal 1.5 mg PO qHS  Continue Seroquel to 50 mg PO qHS prn for insomnia/mood stability         Jr Hernandez MD  PGY-2 Psychiatry

## 2018-12-11 NOTE — PLAN OF CARE
Problem: Patient Care Overview (Adult)  Goal: Plan of Care Review  Outcome: Ongoing (interventions implemented as appropriate)   12/11/18 1234   Coping/Psychosocial   Plan Of Care Reviewed With patient   Coping/Psychosocial   Patient Agreement with Plan of Care agrees   Plan of Care Review   Progress improving   Outcome Summary Erika has been visible on unit, attending groups. Social with peers. Denies anxiety, depression and SI. Continues c/o CPAP at night disrupting her sleep. Feels that being here has been helpful, medications adjusted. Affect brighter, easy to enage in conversatin. Hopeful for discharge Thursday, states she will stay with her parents looking forward to this.        Problem: Mood Impairment (Anxiety Signs/Symptoms) (Adult)  Goal: Improved Mood Symptoms  Outcome: Ongoing (interventions implemented as appropriate)

## 2018-12-11 NOTE — PLAN OF CARE
Problem: Patient Care Overview (Adult)  Goal: Plan of Care Review  Outcome: Ongoing (interventions implemented as appropriate)   12/10/18 2140   Coping/Psychosocial   Plan Of Care Reviewed With patient   Coping/Psychosocial   Patient Agreement with Plan of Care agrees   Plan of Care Review   Progress improving   Outcome Summary Pt visible on unit and social with peers. She denies SI. Pt states that she feels frustrated about her difficulty sleeping. She has a lot of anxiety surrounding her sleep as well. She stated today she is trying to stay up till her 1:1 comes for her CPAP. She is trying to achieve this goal by not napping and keeping busy. Pt reported another contributor to her anxiety is her work, and she is worried about what she will tell them when she returns. Pt tearful at times. She went to wrap up group. Staff will continue to monitor and provide support.        Problem: Mood Impairment (Anxiety Signs/Symptoms) (Adult)  Intervention: Manage Emotion/Temperament/Mood   12/10/18 2140   Manage Emotion/Temperament/Mood   Mutually Determined Action Steps (Manage Emotion/Temperament/Mood) names external triggers   Individualized Action Step (Manage Emotion/Temperament/Mood) Pt will list at least one external trigger for her anxiety this shift       Goal: Improved Mood Symptoms  Outcome: Ongoing (interventions implemented as appropriate)

## 2018-12-12 PROCEDURE — 63700000 HC SELF-ADMINISTRABLE DRUG: Performed by: PSYCHIATRY & NEUROLOGY

## 2018-12-12 PROCEDURE — 63700000 HC SELF-ADMINISTRABLE DRUG: Performed by: HOSPITALIST

## 2018-12-12 PROCEDURE — 12400000 HC ROOM AND CARE SEMIPRIVATE PSYCH

## 2018-12-12 PROCEDURE — 99232 SBSQ HOSP IP/OBS MODERATE 35: CPT | Performed by: PSYCHIATRY & NEUROLOGY

## 2018-12-12 PROCEDURE — 63700000 HC SELF-ADMINISTRABLE DRUG: Performed by: STUDENT IN AN ORGANIZED HEALTH CARE EDUCATION/TRAINING PROGRAM

## 2018-12-12 RX ADMIN — QUETIAPINE 50 MG: 50 TABLET, FILM COATED ORAL at 21:00

## 2018-12-12 RX ADMIN — FAMOTIDINE 20 MG: 20 TABLET, FILM COATED ORAL at 08:07

## 2018-12-12 RX ADMIN — VENLAFAXINE HYDROCHLORIDE 37.5 MG: 37.5 CAPSULE, EXTENDED RELEASE ORAL at 08:07

## 2018-12-12 RX ADMIN — RISPERIDONE 1.5 MG: 0.5 TABLET, ORALLY DISINTEGRATING ORAL at 21:00

## 2018-12-12 RX ADMIN — Medication 1 MG: at 19:14

## 2018-12-12 RX ADMIN — FAMOTIDINE 20 MG: 20 TABLET, FILM COATED ORAL at 17:32

## 2018-12-12 RX ADMIN — CARBAMAZEPINE 200 MG: 200 TABLET ORAL at 17:32

## 2018-12-12 RX ADMIN — CARBAMAZEPINE 200 MG: 200 TABLET ORAL at 12:06

## 2018-12-12 RX ADMIN — LORATADINE 10 MG: 10 TABLET ORAL at 08:07

## 2018-12-12 RX ADMIN — CARBAMAZEPINE 200 MG: 200 TABLET ORAL at 08:07

## 2018-12-12 NOTE — PLAN OF CARE
Problem: Patient Care Overview (Adult)  Goal: Plan of Care Review   12/12/18 1007   Coping/Psychosocial   Plan Of Care Reviewed With patient;mother   Coping/Psychosocial   Patient Agreement with Plan of Care agrees   Plan of Care Review   Progress improving   Outcome Summary Social worekr discussed d/c planning with patient's mother. [facility] but is interested in finding new psychiatrist.  gave name of [facility].       Problem: Overarching Goals (Adult)  Goal: Optimized Coping Skills in Response to Life Stressors   12/12/18 1007   Overarching Goals   Optimized Coping Skills in Response to Life Stressors making progress toward outcome   Individual Goal Patient idenfied community suppoprt ([facility]) to access after d/c from unit.     Goal: Develops/Participates in Therapeutic Fosters to Support Successful Transition   12/12/18 1007   Overarching Goals   Develops/Participates in Therapeutic Fosters to Support Successful Transition making progress toward outcome   Individual Goal patient engaged with  in d/c discussion and planning.

## 2018-12-12 NOTE — PROGRESS NOTES
"Psychiatry Progress Note    Chief Complaint/Reason for follow-up: Mood disorder other, Cluster B personality traits.  Autism Spectrum disorder.    Interval History: Patient says \"today is the best day I have had since I have been here.\"  She says her anxiety is reduced.  She is sleeping better, and she is doing more thinking, but \"clear thinking\".  She plans to go back to work after she is discharged.  She agrees not to restart stimulants, and will follow-up in [facility].  She feels medications and groups have been helpful here. She plans to restart her CPAP when she is at home.    Review of Systems:   Sleep:  Good, Appetite: Good and GI: No complaints    Vital Signs for the last 24 hours:  Temp:  [36.9 °C (98.4 °F)] 36.9 °C (98.4 °F)  Heart Rate:  [87-88] 88  Resp:  [18] 18  BP: (114-115)/(49-58) 115/58    Medications  Scheduled    carBAMazepine 200 mg oral TID   famotidine 20 mg oral BID   loratadine 10 mg oral Daily   melatonin 1 mg oral Nightly   risperiDONE 1.5 mg oral Nightly   venlafaxine XR 37.5 mg oral Daily with breakfast     PRN  •  acetaminophen  •  alum-mag hydroxide-simeth  •  dextran 70-hypromellose (PF)  •  diphenhydrAMINE  •  docusate sodium  •  haloperidol  •  haloperidol lactate  •  ibuprofen  •  ondansetron ODT  •  QUEtiapine  •  sodium chloride      Mental Status Exam:  Appearance: appropriate attire  Gait and Motor: Regular  Speech: normal rate/rhythm/volume  Mood: better  Affect: normal  Associations: coherent  Thought Process: goal-directed  Thought Content: appropriate to situation  Suicidality/Homicidality: denies  Judgement/Insight: good  Orientation: day, month, year, season, type of place, name of place, city and situation  Memory: recalls recent events and recalls remote events  Attention: alert      Assessment/Plan  * Unspecified mood (affective) disorder (CMS/formerly [facility])   Assessment & Plan    Start risperdal 0.5 BID for mood stability  D/C Paxil for ineffectiveness  D/C Ritalin for activation   "      Patient seen and evaluated, we discussed the treatment plan. She is agreeable for discharge tomorrow, Thursday, December 13, and will follow up in IOP.  Patient agrees not to restart stimulants.    Ga Horne MD  12/12/2018

## 2018-12-12 NOTE — PLAN OF CARE
Problem: Patient Care Overview (Adult)  Goal: Plan of Care Review  Outcome: Ongoing (interventions implemented as appropriate)   12/11/18 2042   Coping/Psychosocial   Plan Of Care Reviewed With patient   Coping/Psychosocial   Patient Agreement with Plan of Care agrees   Plan of Care Review   Progress improving   Outcome Summary Pt is visible on the unit and social with peers. She denies SI. Pt states she is anxious about returning to work, and what she will tell her coworkers when they ask her where she has been. Her CPAP is no longer ordered, per patient request, for she feels it is making it harder to sleep. She stated she will use it again when she goes home. Pt went to wrap up group. Staff will continue to monitor and provide support.        Problem: Mood Impairment (Anxiety Signs/Symptoms) (Adult)  Goal: Improved Mood Symptoms  Outcome: Ongoing (interventions implemented as appropriate)   12/11/18 2042   Improved Mood Symptoms (Anxiety)   Goal Outcome making progress toward outcome   [place] Goal names internal triggers;names external triggers   Individual Goal Erika will identify at least one thing that is contributing to her anxiety this shift.

## 2018-12-12 NOTE — DISCHARGE INSTR - APPOINTMENTS
[facility]-Outpatient [facility]-ep-sovzyxbjhs-12/27/18 at 9:30am    trend[facility] Escoto-psychotherapist-appt.-12/19/18 at 2:00pm

## 2018-12-12 NOTE — PLAN OF CARE
"Problem: Patient Care Overview (Adult)  Goal: Plan of Care Review  Outcome: Ongoing (interventions implemented as appropriate)   12/12/18 1520   Plan of Care Review   Progress improving   Outcome Summary Erika states \"Today was the best day I have had in such a long time!\" She rates her mood as a \"4 or 5\". She talked at length about her plans when she leaves to return to work on Monday and to start exercising again. She denies SI at this time and states that her family is very supportive. She was compliant with medications this shift. Staff will continue to monitor her for safety.        Problem: Mood Impairment (Anxiety Signs/Symptoms) (Adult)  Goal: Improved Mood Symptoms   12/12/18 1520   Improved Mood Symptoms (Anxiety)   [place] Goal adheres to medication regimen   Individual Goal Erika will adhere to medication regimen today.          "

## 2018-12-13 VITALS
DIASTOLIC BLOOD PRESSURE: 72 MMHG | HEART RATE: 80 BPM | TEMPERATURE: 98.7 F | WEIGHT: 213 LBS | OXYGEN SATURATION: 99 % | HEIGHT: 64 IN | RESPIRATION RATE: 16 BRPM | BODY MASS INDEX: 36.37 KG/M2 | SYSTOLIC BLOOD PRESSURE: 110 MMHG

## 2018-12-13 PROCEDURE — 63700000 HC SELF-ADMINISTRABLE DRUG: Performed by: PSYCHIATRY & NEUROLOGY

## 2018-12-13 PROCEDURE — 63700000 HC SELF-ADMINISTRABLE DRUG: Performed by: HOSPITALIST

## 2018-12-13 RX ORDER — RISPERIDONE 0.5 MG/1
1.5 TABLET ORAL NIGHTLY
Qty: 90 TABLET | Refills: 0 | OUTPATIENT
Start: 2018-12-13 | End: 2019-02-27 | Stop reason: HOSPADM

## 2018-12-13 RX ORDER — VENLAFAXINE 37.5 MG/1
37.5 TABLET ORAL
Qty: 30 TABLET | Refills: 0 | OUTPATIENT
Start: 2018-12-13 | End: 2019-02-27 | Stop reason: HOSPADM

## 2018-12-13 RX ORDER — QUETIAPINE FUMARATE 50 MG/1
50 TABLET, FILM COATED ORAL NIGHTLY PRN
Qty: 30 TABLET | Refills: 0 | OUTPATIENT
Start: 2018-12-13 | End: 2019-02-27 | Stop reason: HOSPADM

## 2018-12-13 RX ADMIN — CARBAMAZEPINE 200 MG: 200 TABLET ORAL at 08:39

## 2018-12-13 RX ADMIN — SALINE NASAL SPRAY 1 SPRAY: 1.5 SOLUTION NASAL at 08:40

## 2018-12-13 RX ADMIN — DEXTRAN, HYPROMELLOSE 1 DROP: 1; 3 SOLUTION/ DROPS OPHTHALMIC at 08:40

## 2018-12-13 RX ADMIN — LORATADINE 10 MG: 10 TABLET ORAL at 08:39

## 2018-12-13 RX ADMIN — VENLAFAXINE HYDROCHLORIDE 37.5 MG: 37.5 CAPSULE, EXTENDED RELEASE ORAL at 08:39

## 2018-12-13 RX ADMIN — FAMOTIDINE 20 MG: 20 TABLET, FILM COATED ORAL at 08:39

## 2018-12-13 NOTE — NURSING NOTE
Patient discharged in company of family . D/C instructions and scripts reviewed, patient expressed understanding.  Belongings given.

## 2018-12-13 NOTE — PLAN OF CARE
Problem: Patient Care Overview (Adult)  Goal: Plan of Care Review  Outcome: Ongoing (interventions implemented as appropriate)   12/12/18 2104   Coping/Psychosocial   Plan Of Care Reviewed With patient   Coping/Psychosocial   Patient Agreement with Plan of Care agrees   Plan of Care Review   Progress improving   Outcome Summary Pt visible on unit and social with peers. She denies SI. Pt states she is a bit anxious for discharge tomorrow. A pt who was here previously was readmitted today, which had Erika worrying that she might end up being readmitted a few days after discharge. She states her goal is for that not to happen. She tried the medication tape this evening. Pt went to wrap up group. She was med compliant this shift. Staff will continue to monitor and provide support.

## 2018-12-13 NOTE — STUDENT
MA form filled out by Dr. Hernandez and signed by Dr. Horne. Prescriptions also placed in chart. Patient is excited to go home and has packed all her belongings.      Juan Jose Armando, MS3  [facility]  Psychiatry  Date: 12/13/2018

## 2019-02-08 ENCOUNTER — HOSPITAL ENCOUNTER (INPATIENT)
Facility: HOSPITAL | Age: 39
LOS: 19 days | DRG: 885 | End: 2019-02-27
Attending: EMERGENCY MEDICINE | Admitting: PSYCHIATRY & NEUROLOGY
Payer: MEDICARE

## 2019-02-08 DIAGNOSIS — F31.60: Primary | ICD-10-CM

## 2019-02-08 LAB
ANION GAP SERPL CALC-SCNC: 11 MEQ/L (ref 3–15)
BUN SERPL-MCNC: 14 MG/DL (ref 8–20)
CALCIUM SERPL-MCNC: 9 MG/DL (ref 8.9–10.3)
CHLORIDE SERPL-SCNC: 104 MEQ/L (ref 98–109)
CO2 SERPL-SCNC: 24 MEQ/L (ref 22–32)
CREAT SERPL-MCNC: 0.7 MG/DL
ERYTHROCYTE [DISTWIDTH] IN BLOOD BY AUTOMATED COUNT: 13 % (ref 11.7–14.4)
GFR SERPL CREATININE-BSD FRML MDRD: >60 ML/MIN/1.73M*2
GLUCOSE SERPL-MCNC: 121 MG/DL (ref 70–99)
HCT VFR BLDCO AUTO: 44.1 %
HGB BLD-MCNC: 14.4 G/DL
MCH RBC QN AUTO: 31.1 PG (ref 28–33.2)
MCHC RBC AUTO-ENTMCNC: 32.7 G/DL (ref 32.2–35.5)
MCV RBC AUTO: 95.2 FL (ref 83–98)
PDW BLD AUTO: 11.8 FL (ref 9.4–12.3)
PLATELET # BLD AUTO: 213 K/UL
POTASSIUM SERPL-SCNC: 4.2 MEQ/L (ref 3.6–5.1)
RBC # BLD AUTO: 4.63 M/UL (ref 3.93–5.22)
SODIUM SERPL-SCNC: 139 MEQ/L (ref 136–144)
TSH SERPL DL<=0.05 MIU/L-ACNC: 1.83 MIU/L (ref 0.34–5.6)
WBC # BLD AUTO: 8.3 K/UL

## 2019-02-08 PROCEDURE — 63700000 HC SELF-ADMINISTRABLE DRUG: Performed by: PSYCHIATRY & NEUROLOGY

## 2019-02-08 PROCEDURE — 36415 COLL VENOUS BLD VENIPUNCTURE: CPT | Performed by: HOSPITALIST

## 2019-02-08 PROCEDURE — 93005 ELECTROCARDIOGRAM TRACING: CPT | Performed by: HOSPITALIST

## 2019-02-08 PROCEDURE — 80048 BASIC METABOLIC PNL TOTAL CA: CPT | Performed by: HOSPITALIST

## 2019-02-08 PROCEDURE — 12400000 HC ROOM AND CARE SEMIPRIVATE PSYCH

## 2019-02-08 PROCEDURE — 85027 COMPLETE CBC AUTOMATED: CPT | Performed by: HOSPITALIST

## 2019-02-08 PROCEDURE — 200200 PR NO CHARGE: Performed by: HOSPITALIST

## 2019-02-08 PROCEDURE — 84443 ASSAY THYROID STIM HORMONE: CPT | Performed by: HOSPITALIST

## 2019-02-08 RX ORDER — HYDROXYZINE HYDROCHLORIDE 25 MG/1
50 TABLET, FILM COATED ORAL EVERY 6 HOURS PRN
Status: DISCONTINUED | OUTPATIENT
Start: 2019-02-08 | End: 2019-02-27 | Stop reason: HOSPADM

## 2019-02-08 RX ORDER — PANTOPRAZOLE SODIUM 20 MG/1
20 TABLET, DELAYED RELEASE ORAL 2 TIMES DAILY
Status: DISCONTINUED | OUTPATIENT
Start: 2019-02-08 | End: 2019-02-27 | Stop reason: HOSPADM

## 2019-02-08 RX ORDER — RISPERIDONE 0.5 MG/1
0.5 TABLET, ORALLY DISINTEGRATING ORAL 3 TIMES DAILY PRN
Status: DISCONTINUED | OUTPATIENT
Start: 2019-02-08 | End: 2019-02-27 | Stop reason: HOSPADM

## 2019-02-08 RX ORDER — CARBAMAZEPINE 200 MG/1
200 TABLET ORAL ONCE
Status: COMPLETED | OUTPATIENT
Start: 2019-02-08 | End: 2019-02-08

## 2019-02-08 RX ORDER — QUETIAPINE FUMARATE 50 MG/1
50 TABLET, FILM COATED ORAL NIGHTLY
Status: DISCONTINUED | OUTPATIENT
Start: 2019-02-08 | End: 2019-02-14

## 2019-02-08 RX ORDER — RISPERIDONE 0.5 MG/1
0.5 TABLET, ORALLY DISINTEGRATING ORAL 2 TIMES DAILY
Status: DISCONTINUED | OUTPATIENT
Start: 2019-02-08 | End: 2019-02-13

## 2019-02-08 RX ORDER — RISPERIDONE 0.5 MG/1
0.5 TABLET, ORALLY DISINTEGRATING ORAL ONCE
Status: COMPLETED | OUTPATIENT
Start: 2019-02-08 | End: 2019-02-08

## 2019-02-08 RX ORDER — CARBAMAZEPINE 200 MG/1
200 TABLET ORAL
Status: DISCONTINUED | OUTPATIENT
Start: 2019-02-08 | End: 2019-02-27 | Stop reason: HOSPADM

## 2019-02-08 RX ADMIN — RISPERIDONE 0.5 MG: 0.5 TABLET, ORALLY DISINTEGRATING ORAL at 20:33

## 2019-02-08 RX ADMIN — CARBAMAZEPINE 200 MG: 200 TABLET ORAL at 15:01

## 2019-02-08 RX ADMIN — CARBAMAZEPINE 200 MG: 200 TABLET ORAL at 20:30

## 2019-02-08 RX ADMIN — PANTOPRAZOLE SODIUM 20 MG: 20 TABLET, DELAYED RELEASE ORAL at 17:22

## 2019-02-08 RX ADMIN — QUETIAPINE 50 MG: 50 TABLET, FILM COATED ORAL at 20:33

## 2019-02-08 NOTE — PLAN OF CARE
Problem: Overarching Goals (Adult)  Goal: Adheres to Safety Considerations for Self and Others    Intervention: Develop/Maintain Individualized Safety Plan   02/08/19 1204   Develop/Maintain Individualized Safety Plan   Safety Measures self-directed behavior promoted       Goal: Optimized Coping Skills in Response to Life Stressors    Intervention: Promote Effective Coping Strategies   02/08/19 1204   Coping/Psychosocial Interventions   Supportive Measures relaxation techniques promoted;self-responsibility promoted;active listening utilized;journaling promoted       Goal: Develops/Participates in Therapeutic Perkins to Support Successful Transition    Intervention: Foster Therapeutic Perkins   02/08/19 1204   Psychosocial Support   Trust Relationship/Rapport questions encouraged;empathic listening provided

## 2019-02-08 NOTE — ASSESSMENT & PLAN NOTE
Patient seen and evaluated by Dr Ga Horne from psychiatry  Continue psych meds- seroquel, tegretol and effexor  Transfer notes from [facility] noted  Check baseline labs including tsh  Check baseline ekg for qt interval

## 2019-02-08 NOTE — PLAN OF CARE
"Problem: Patient Care Overview (Adult)  Goal: Plan of Care Review  Outcome: Ongoing (interventions implemented as appropriate)   02/08/19 1339   Coping/Psychosocial   Patient Agreement with Plan of Care agrees   Plan of Care Review   Progress progress toward functional goals as expected   Outcome Summary Pt arrived at 1130 and was cooperative with admission process. She is flat speaking with a monotone, states she is sleeping ok, \"I was a pt here in Dec for 2 weeks and I have lost 18 lbs in last 2 months. I am trying to lose weight and go to gym but I also havnt had an appetite. My anxiety is 6, depression is 5, not suicidal, I won' tharm myself or others, I keep thinking of words and death and spending too much time in my head. Rae been ruminating for a month. I had a melt down at work, Im a  and the vacumn doesn't vacumn up the salt and I feel I havnt done my job. Now my boss knows more about me than I had wanted. I am self critical, I'm not my cheerful self, like I was when I left here in Dec. For my ?[place] I have a CI=community inclusion who helps me to do bills and get into the community Livier Hanks,and a behavior specialist Mahesh Chatterjee, A psychiatrist Dr. Moser and therapist Kaitlin Barraza. I am getting a new  soon. My PHP was in [place] so  Went to the [facility]. Been too down to hang out with friends. Reportedly pt was agitated in [place] ER saying she can't take the thioughts anymore          Problem: Cognitive Impairment (Psychotic Signs/Symptoms) (Adult)  Goal: Improved Thought Clarity/Organization  Outcome: Ongoing (interventions implemented as appropriate)        "

## 2019-02-08 NOTE — CONSULTS
Hospital Medicine Service -  Inpatient Consultation         Requesting Physician: Dr Ga Horne    Reason for Consultation: admission H&P     HISTORY OF PRESENT ILLNESS        This is a 38 y.o. female with a past medical history of Asperger's syndrome/ bipolar disorder is admitted to psych unit with worsening symptoms of anxiety patient was transferred from [facility] this afternoon because of the ongoing symptoms.  She denies any suicidal ideation she denies any chest pain or shortness of breath she denies any bowel or bladder disturbance.  The patient was admitted to [facility] psych unit in December last year already evaluated by psychiatrist this afternoon who thinks the increased dose of Effexor might be contributing to her symptoms.    PAST MEDICAL AND SURGICAL HISTORY        Past Medical History:   Diagnosis Date   • Anxiety    • Asperger syndrome    • Bipolar 1 disorder (CMS/HCC) (HCC)    • Depression        Past Surgical History:   Procedure Laterality Date   • DEBRIDEMENT SKIN / SUBCU / MUSCLE OF HEAD / NECK     • PILONIDAL CYST / SINUS EXCISION     • TONSILLECTOMY AND ADENOIDECTOMY     • WISDOM TOOTH EXTRACTION         PCP: Unable, To Obtain Pcp    MEDICATIONS        Home Medications:  Prescriptions Prior to Admission   Medication Sig Dispense Refill Last Dose   • carBAMazepine (TEGretol) 200 mg tablet Take 200 mg by mouth 3 (three) times a day.   12/4/2018 at Unknown time   • loratadine (CLARITIN) 10 mg tablet Take 10 mg by mouth daily.   12/4/2018 at Unknown time   • ranitidine (ZANTAC) 150 mg tablet Take 300 mg by mouth 2 (two) times a day. AM and HS    12/4/2018 at Unknown time   • solifenacin (VESICARE) 5 mg tablet Take 1 tablet by mouth daily.   Unknown at Unknown time       Current inpatient medications were personally reviewed.    ALLERGIES        Penicillins    FAMILY HISTORY        No family history on file.    SOCIAL HISTORY        Social History     Social History   • Marital  status: Single     Spouse name: N/A   • Number of children: 0   • Years of education: N/A     Occupational History   • part-time  for [employer]      Social History Main Topics   • Smoking status: Never Smoker   • Smokeless tobacco: Never Used   • Alcohol use Yes      Comment: Rare alcohol consumption   • Drug use: No   • Sexual activity: No     Other Topics Concern   • None     Social History Narrative    Patient is single and resides alone    She works as a        REVIEW OF SYSTEMS        All other systems reviewed and negative except as noted in HPI    PHYSICAL EXAMINATION        There were no vitals taken for this visit.  There is no height or weight on file to calculate BMI.  No intake or output data in the 24 hours ending 02/08/19 1607    Physical Exam    LABS / EKG        Labs  Labs are pending.    ECG/Telemetry  pending    Imaging  Not applicable    ASSESSMENT AND RECOMMENDATIONS           Bipolar affective disorder, mixed (CMS/HCC) (HCC)   Assessment & Plan    Patient seen and evaluated by Dr Ga Horne from psychiatry  Continue psych meds- seroquel, tegretol and effexor  Transfer notes from [facility] noted  Check baseline labs including tsh  Check baseline ekg for qt interval                  Jeison Pozo MD  2/8/2019

## 2019-02-09 LAB
ATRIAL RATE: 82
P AXIS: 47
PR INTERVAL: 152
QRS DURATION: 72
QT INTERVAL: 364
QTC CALCULATION(BAZETT): 425
R AXIS: 27
T WAVE AXIS: 13
VENTRICULAR RATE: 82

## 2019-02-09 PROCEDURE — 63700000 HC SELF-ADMINISTRABLE DRUG: Performed by: PSYCHIATRY & NEUROLOGY

## 2019-02-09 PROCEDURE — 99232 SBSQ HOSP IP/OBS MODERATE 35: CPT | Performed by: PSYCHIATRY & NEUROLOGY

## 2019-02-09 PROCEDURE — 12400000 HC ROOM AND CARE SEMIPRIVATE PSYCH

## 2019-02-09 RX ORDER — TRAZODONE HYDROCHLORIDE 50 MG/1
50 TABLET ORAL NIGHTLY PRN
Status: DISCONTINUED | OUTPATIENT
Start: 2019-02-09 | End: 2019-02-11

## 2019-02-09 RX ADMIN — QUETIAPINE 50 MG: 50 TABLET, FILM COATED ORAL at 20:06

## 2019-02-09 RX ADMIN — PANTOPRAZOLE SODIUM 20 MG: 20 TABLET, DELAYED RELEASE ORAL at 08:44

## 2019-02-09 RX ADMIN — RISPERIDONE 0.5 MG: 0.5 TABLET, ORALLY DISINTEGRATING ORAL at 08:45

## 2019-02-09 RX ADMIN — CARBAMAZEPINE 200 MG: 200 TABLET ORAL at 12:18

## 2019-02-09 RX ADMIN — CARBAMAZEPINE 200 MG: 200 TABLET ORAL at 08:44

## 2019-02-09 RX ADMIN — PANTOPRAZOLE SODIUM 20 MG: 20 TABLET, DELAYED RELEASE ORAL at 17:16

## 2019-02-09 RX ADMIN — RISPERIDONE 0.5 MG: 0.5 TABLET, ORALLY DISINTEGRATING ORAL at 17:39

## 2019-02-09 RX ADMIN — CARBAMAZEPINE 200 MG: 200 TABLET ORAL at 17:16

## 2019-02-09 NOTE — PLAN OF CARE
Problem: Patient Care Overview (Adult)  Goal: Plan of Care Review  Outcome: Ongoing (interventions implemented as appropriate)    Goal: Individualization & Mutuality  Outcome: Ongoing (interventions implemented as appropriate)    Goal: Discharge Needs Assessment  Outcome: Ongoing (interventions implemented as appropriate)      Problem: Cognitive Impairment (Psychotic Signs/Symptoms) (Adult)  Goal: Improved Thought Clarity/Organization  Outcome: Ongoing (interventions implemented as appropriate)

## 2019-02-09 NOTE — PROGRESS NOTES
Psychiatry Progress Note    Chief Complaint: unstable mood    Interval History: Patient was admitted yesterday, she slept well last night, not feeling good toddy, she still has passive suicide thoughths, feeling down, crying this morning. She could not specify any stressors in her life. She denied auditory or visual hallucinations, she follows rules in the unit.        Vital Signs for the last 24 hours:  Temp:  [36.7 °C (98.1 °F)-36.9 °C (98.5 °F)] 36.7 °C (98.1 °F)  Heart Rate:  [74-97] 74  Resp:  [16-18] 16  BP: (114-134)/(51-64) 134/64    Labs:  No new lab test results today    Mental Status Exam:  Appearance: well groom  Body movements: no EPS or hand tremors noticed  Speech: soft, clear fluent  Mood: depress  Affect: flat  Associations: Logical  Thought Process: goal oriented  Thought Content: no hallucinations, no paranoid thoughts  Suicidality/Homicidality: denied  Judgement/Insight: limited due to current status, she could not specify any stressors in life, no positive plan for the future.  Orientation: x 3  Memory: recent memory intact  Attention: fair  Knowledge: average  Language: no word finding difficulties       Assessment and Plan: Patient is depress, had passive suicide thoughts, feels helpless, hopeless, will continue current medications, monitor mood, thought process and behavior.      Ga Bloom MD  [phone]

## 2019-02-09 NOTE — PLAN OF CARE
"Problem: Patient Care Overview (Adult)  Goal: Plan of Care Review  Outcome: Ongoing (interventions implemented as appropriate)  Erika has been visible in the milieu; attending groups. Her affect is flat. Mood is anxious. She ate well and visited with her family. She was tearful during one on one with RN, reporting intrusive thoughts of harming herself and others. Preoccupation with certain words like \" death\"   \"kill\" \"dying\"  She denies any urges or intentions to harm self or others.  She is anxious, and feeling hopeless. Requires reassurance. EKG completed. Family brought in discharge med list from last admission, which was placed in chart. Patient requested HS medications a bit earlier. Will continue to monitor and offer support.     Problem: Cognitive Impairment (Psychotic Signs/Symptoms) (Adult)  Goal: Improved Thought Clarity/Organization  Outcome: Ongoing (interventions implemented as appropriate)        "

## 2019-02-10 PROCEDURE — 12400000 HC ROOM AND CARE SEMIPRIVATE PSYCH

## 2019-02-10 PROCEDURE — 99232 SBSQ HOSP IP/OBS MODERATE 35: CPT | Performed by: PSYCHIATRY & NEUROLOGY

## 2019-02-10 PROCEDURE — 63700000 HC SELF-ADMINISTRABLE DRUG: Performed by: PSYCHIATRY & NEUROLOGY

## 2019-02-10 RX ADMIN — PANTOPRAZOLE SODIUM 20 MG: 20 TABLET, DELAYED RELEASE ORAL at 08:25

## 2019-02-10 RX ADMIN — CARBAMAZEPINE 200 MG: 200 TABLET ORAL at 12:26

## 2019-02-10 RX ADMIN — CARBAMAZEPINE 200 MG: 200 TABLET ORAL at 17:15

## 2019-02-10 RX ADMIN — PANTOPRAZOLE SODIUM 20 MG: 20 TABLET, DELAYED RELEASE ORAL at 17:15

## 2019-02-10 RX ADMIN — RISPERIDONE 0.5 MG: 0.5 TABLET, ORALLY DISINTEGRATING ORAL at 08:25

## 2019-02-10 RX ADMIN — RISPERIDONE 0.5 MG: 0.5 TABLET, ORALLY DISINTEGRATING ORAL at 17:15

## 2019-02-10 RX ADMIN — QUETIAPINE 50 MG: 50 TABLET, FILM COATED ORAL at 20:39

## 2019-02-10 RX ADMIN — CARBAMAZEPINE 200 MG: 200 TABLET ORAL at 08:26

## 2019-02-10 NOTE — PLAN OF CARE
"Problem: Patient Care Overview (Adult)  Goal: Plan of Care Review  Outcome: Ongoing (interventions implemented as appropriate)   02/09/19 6420   Coping/Psychosocial   Patient Agreement with Plan of Care agrees   Plan of Care Review   Progress no change   Outcome Summary Pt continues to have a lot of anxiety and fear. She reports that she is very bored on the unit and that allows her to have time to think of negative words such as \"kill and hang.\" She is tearful throughout the day while discussing the intrusive thoughts. She reports that she has an overwhelming fear of death, and she has no thoughts of harming herself and could CFS. Staff will continue to monitor.    Coping/Psychosocial   Plan Of Care Reviewed With patient         "

## 2019-02-10 NOTE — PROGRESS NOTES
"Psychiatry Progress Note    Chief Complaint: \" I am anxious had racing thoughts\"    Interval History: Patient feels a little bit better today, no hallucinations, no paranoid thoughts, she had racing thoughts in her head reminding herself not to do certain bad things including not to self harm\". The writer encourage her to write her thoughts down on a piece of paper and her feelings. She agreed to do so.        Vital Signs for the last 24 hours:  Temp:  [36.9 °C (98.5 °F)-37 °C (98.6 °F)] 37 °C (98.6 °F)  Heart Rate:  [68-83] 77  Resp:  [18] 18  BP: (123-158)/(64-69) 128/66    Labs:  No new lab test results today.    Mental Status Exam:  Appearance:well groom, fair eye contact  Body movements: no EPS or hand tremors noticed  Speech: clear fluent, normal volume and tone  Mood: anxious  Affect:anxiosu  Associations: Logical  Thought Process: goal oriented  Thought Content: no hallucinations, no paranoid thoughts  Suicidality/Homicidality: denied  Judgement/Insight: improving  Orientation: x 3  Memory: recent memory intact  Attention: fair  Knowledge: avearge  Language: no word finding difficulties       Assessment and Plan: Patient is anxious had racing thoughts with rumination. Encourage her to write down her thoughts and discuss with staffs. Continue current medications, monitor mood, thought process and behaviors.      Ga Bloom MD  [phone]  "

## 2019-02-11 PROCEDURE — 63700000 HC SELF-ADMINISTRABLE DRUG: Performed by: PSYCHIATRY & NEUROLOGY

## 2019-02-11 PROCEDURE — 12400000 HC ROOM AND CARE SEMIPRIVATE PSYCH

## 2019-02-11 PROCEDURE — 90792 PSYCH DIAG EVAL W/MED SRVCS: CPT | Performed by: PSYCHIATRY & NEUROLOGY

## 2019-02-11 RX ADMIN — PANTOPRAZOLE SODIUM 20 MG: 20 TABLET, DELAYED RELEASE ORAL at 08:21

## 2019-02-11 RX ADMIN — CARBAMAZEPINE 200 MG: 200 TABLET ORAL at 17:06

## 2019-02-11 RX ADMIN — RISPERIDONE 0.5 MG: 0.5 TABLET, ORALLY DISINTEGRATING ORAL at 17:06

## 2019-02-11 RX ADMIN — QUETIAPINE 50 MG: 50 TABLET, FILM COATED ORAL at 20:32

## 2019-02-11 RX ADMIN — CARBAMAZEPINE 200 MG: 200 TABLET ORAL at 12:02

## 2019-02-11 RX ADMIN — RISPERIDONE 0.5 MG: 0.5 TABLET, ORALLY DISINTEGRATING ORAL at 12:05

## 2019-02-11 RX ADMIN — PANTOPRAZOLE SODIUM 20 MG: 20 TABLET, DELAYED RELEASE ORAL at 17:06

## 2019-02-11 RX ADMIN — CARBAMAZEPINE 200 MG: 200 TABLET ORAL at 08:21

## 2019-02-11 RX ADMIN — RISPERIDONE 0.5 MG: 0.5 TABLET, ORALLY DISINTEGRATING ORAL at 08:22

## 2019-02-11 NOTE — PLAN OF CARE
Problem: Patient Care Overview (Adult)  Goal: Plan of Care Review  Outcome: Ongoing (interventions implemented as appropriate)   02/11/19 7228   Coping/Psychosocial   Patient Agreement with Plan of Care agrees   Plan of Care Review   Progress progress towards functional goals is fair   Outcome Summary Erika became very anxious in early am when she discovered that she wasn't on effexor any longer and that protonix was substituted for her zantac.Perseverating on inability to manage these changes to her routine.  Initially didn't want to take a prn, and was verbalizing that she was a failure. Crying. Did attempt to go to group.  Initially refused to use meditation, but eventually did, along with taking a prn of risperdal. Afterwards, was able to join group and appear less anxious. Able to eat 100%. ADL's overall better.   Coping/Psychosocial   Plan Of Care Reviewed With patient       Problem: Cognitive Impairment (Psychotic Signs/Symptoms) (Adult)  Goal: Improved Thought Clarity/Organization  Outcome: Ongoing (interventions implemented as appropriate)

## 2019-02-11 NOTE — H&P
"Psychiatry History and Physical     Chief complaint- \"I do not know what is going on with me.\"    LANEY Vaughan is a 38 y.o. female who presents with history of mood disorder, rule out Bipolar affective disorder, autism spectrum disorder, OCD and borderline personality disorder admitted after worsening perseverative thoughts about death and dying.  The patient was recently hospitalized at [facility] from 2018 to 2018 for a similar problem and presentation.  The patient tells me that she was well for approximately 5 weeks after her discharge and became more anxious and upset after she got a \"news feed on my phone that some celebrity \" and after this she began to perseverate about death and was worried about her family and everyone else that she felt close to dying in the future.  It was around this time that the patient began a partial program and some of her medications were changed including increasing her Effexor and starting her on Ativan.  The patient tells me \"I feel like I am doing all this for attention and just making this stuff up.\"  She denies any suicidal or homicidal ideations but admits that she Does not want to hear certain words such as \"kill\" or \"dying\", or \"dead\" or \"choke.\"  She tells me \"I am not going to act on anything, but I do not know what to do to get away from these negative thoughts.\"  The patient also admits to me that she thinks about having a family and kids but she is worried that they would have the same problems that she had and that she would not know how to help them because \"I cannot even help myself.\"  The patient denies auditory or visual hallucinations but does admit to perseverative worry and focus on certain words.    Psychiatric History:  Diagnoses: Bipolar disorder   Suicide: Risk Factors: Presence of a Mood Disorder     - Protective Factors: Effective and accessible mental health care , Connectedness to individuals, family, community, and social " institutions and Problem-solving and conflict resolution skills   Current Psychiatrist/Therapist: yes - Dr. Moser  Past psychiatric Hospitalization: yes-was hospitalized at [place] 5 years ago  Past suicide attempts: no  Medication Trials:  yes - tegretol, ritalin, paxil, ativan    ECT trials: no    Substance Use History:  Substance use:  Denies  Consequences of use: No  Past D&A Treatment: No    Family History: No family history on file.    Social History:   Social History     Social History   • Marital status: Single     Spouse name: N/A   • Number of children: 0   • Years of education: N/A     Occupational History   • part-time  for [employer]      Social History Main Topics   • Smoking status: Never Smoker   • Smokeless tobacco: Never Used   • Alcohol use Yes      Comment: Rare alcohol consumption   • Drug use: No   • Sexual activity: No     Other Topics Concern   • None     Social History Narrative    Patient is single and resides alone    She works as a        Medical History:   Past Medical History:   Diagnosis Date   • Anxiety    • Asperger syndrome    • Bipolar 1 disorder (CMS/HCC) ([facility])    • Depression        Surgical History:   Past Surgical History:   Procedure Laterality Date   • DEBRIDEMENT SKIN / SUBCU / MUSCLE OF HEAD / NECK     • PILONIDAL CYST / SINUS EXCISION     • TONSILLECTOMY AND ADENOIDECTOMY     • WISDOM TOOTH EXTRACTION         Allergies:   Allergies   Allergen Reactions   • Penicillins Other (see comments)     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  •  loratadine, Take 10 mg by mouth daily.  •  ranitidine, Take 300 mg  by mouth 2 (two) times a day. AM and HS   •  solifenacin, Take 1 tablet by mouth daily.      Objective     Vital Signs for the last 24 hours:  Temp:  [36.9 °C (98.5 °F)-37.1 °C (98.7 °F)] 36.9 °C (98.5 °F)  Heart Rate:  [72-86] 80  Resp:  [16-18] 16  BP: (105-143)/(56-69) 143/69        MENTAL STATUS EXAM  Appearance: appropriate attire  Gait and Motor: slow  Speech: normal rate/rhythm/volume  Mood: depressed, anxious and sad  Affect: Tearful  Associations: coherent  Thought Process: goal-directed  Thought Content: appropriate to situation and obsessions  Suicidality/Homicidality: denies  Judgement/Insight: help accepting  Orientation: day, month, year, type of place, name of place, city and situation  Memory: recalls recent events and recalls remote events  Attention: alert         Assessment/Plan     38-year-old  female with history of mood disorder, autism spectrum disorder, OCD and borderline personality disorder admitted after worsening perseverative thoughts about death and dying.  The patient's family reports a decline in her ability to function, but also states that she was doing well up to 5 weeks after discharge from [facility].  We will resume her previous medication protocol, including starting Risperdal 0.5 mg twice daily with an additional 0.5 mg 3 times daily as needed for agitation and holding Effexor XR, which was thought to be too activating for the patient above 37.5 mg.  We will initially Hold this medication and decide about adding it after the patient is observed in treatment.  We will also continue Tegretol 200 mg 3 times a day plus Seroquel 50 mg at bedtime for mood stability and insomnia.  Patient is encouraged to attend group and other unit functions and use guided meditation as a means to defocus off of stressful perseverative thinking patterns.

## 2019-02-11 NOTE — PLAN OF CARE
Problem: Patient Care Overview (Adult)  Goal: Plan of Care Review  Outcome: Ongoing (interventions implemented as appropriate)   02/10/19 2013   Coping/Psychosocial   Patient Agreement with Plan of Care agrees   Plan of Care Review   Progress progress toward functional goals is gradual   Outcome Summary Pt is visible on the unit and slightly social with peers. She reports that today was slightly better than yesterday, but she continues to perseverate on things like the fear of death. She tries to push the thoughts out her mind and keep distracted by staying visible and journaling. She goes to all groups, has a flat affect but does deny SI. She is med compliant. [facility] will continue to monitor.    Coping/Psychosocial   Plan Of Care Reviewed With patient

## 2019-02-11 NOTE — PLAN OF CARE
Problem: Overarching Goals (Adult)  Goal: Adheres to Safety Considerations for Self and Others  Outcome: Ongoing (interventions implemented as appropriate)   02/11/19 1121   Overarching Goals   Adheres to Safety Considerations for Self and Others making progress toward outcome   Individual Goal Patient will contact staff if suicidal feelings develop.     Goal: Optimized Coping Skills in Response to Life Stressors  Outcome: Ongoing (interventions implemented as appropriate)   02/11/19 1121   Overarching Goals   Optimized Coping Skills in Response to Life Stressors making progress toward outcome   Individual Goal  and patient will identify day progamming for patient to attend after d/c from unit.     Goal: Develops/Participates in Therapeutic [place] to Support Successful Transition  Outcome: Ongoing (interventions implemented as appropriate)   02/11/19 1121   Overarching Goals   Develops/Participates in Therapeutic [place] to Support Successful Transition making progress toward outcome   Individual Goal Patient will attend social work group 1x per week.

## 2019-02-12 PROCEDURE — 99232 SBSQ HOSP IP/OBS MODERATE 35: CPT | Performed by: PSYCHIATRY & NEUROLOGY

## 2019-02-12 PROCEDURE — 12400000 HC ROOM AND CARE SEMIPRIVATE PSYCH

## 2019-02-12 PROCEDURE — 63700000 HC SELF-ADMINISTRABLE DRUG: Performed by: PSYCHIATRY & NEUROLOGY

## 2019-02-12 RX ADMIN — CARBAMAZEPINE 200 MG: 200 TABLET ORAL at 12:20

## 2019-02-12 RX ADMIN — RISPERIDONE 0.5 MG: 0.5 TABLET, ORALLY DISINTEGRATING ORAL at 16:47

## 2019-02-12 RX ADMIN — QUETIAPINE 50 MG: 50 TABLET, FILM COATED ORAL at 20:39

## 2019-02-12 RX ADMIN — RISPERIDONE 0.5 MG: 0.5 TABLET, ORALLY DISINTEGRATING ORAL at 08:35

## 2019-02-12 RX ADMIN — CARBAMAZEPINE 200 MG: 200 TABLET ORAL at 16:47

## 2019-02-12 RX ADMIN — PANTOPRAZOLE SODIUM 20 MG: 20 TABLET, DELAYED RELEASE ORAL at 16:47

## 2019-02-12 RX ADMIN — PANTOPRAZOLE SODIUM 20 MG: 20 TABLET, DELAYED RELEASE ORAL at 08:35

## 2019-02-12 RX ADMIN — RISPERIDONE 0.5 MG: 0.5 TABLET, ORALLY DISINTEGRATING ORAL at 08:36

## 2019-02-12 RX ADMIN — CARBAMAZEPINE 200 MG: 200 TABLET ORAL at 08:35

## 2019-02-12 NOTE — ASSESSMENT & PLAN NOTE
38-year-old  female with history of mood disorder, autism spectrum disorder, OCD and borderline personality disorder admitted after worsening perseverative thoughts about death and dying.    Increase Risperdal to 1.5 mg am, 1.5 mg 5pm for mood stability  Increase seroquel to 100mg HS for mood stability and insomnia  Additional risperdal 0.5mg tid prn anxiety / agitation  Tegretol 200mg TID for mood stability  Effexor XR to 37.5 mg for depression, anxiety    Discontinue benzodiazepines for poor response   Promote appropriate mindfulness techniques in response to obsessive anxiety provoking thoughts and behaviors.  AVOID WHENEVER POSSIBLE ASKING PATIENT IF SHE IS HAVING INTRUSIVE THOUGHTS.  Encourage patient to come to staff regularly to just check in.

## 2019-02-12 NOTE — PLAN OF CARE
"Problem: Patient Care Overview (Adult)  Goal: Plan of Care Review  Outcome: Ongoing (interventions implemented as appropriate)   02/12/19 3893   Coping/Psychosocial   Patient Agreement with Plan of Care agrees   Plan of Care Review   Progress progress towards functional goals is fair   Outcome Summary Erika began the day feeling very anxious. c/o intrusive thoughts that she was a failure and not moving forward\" also afraid that her thoughts may progress to thoughts of harming herself. Encouraged patient to try prn risperdal with scheduled dose; this proved helpful to patient, and she was able to participate in all groups today. Tearful at times in early part of day, less so later.  Not isolative.  Spoke with mom on phone, as patient called her and stated that she didn't think she could remember to tell staff how she felt.  Reassured patient that she was able to explain her concerns appropriately.  Denies any plan or thoughts to harm herself or anyone else. Eating well.  Grooming better.   Coping/Psychosocial   Plan Of Care Reviewed With patient       Problem: Cognitive Impairment (Psychotic Signs/Symptoms) (Adult)  Goal: Improved Thought Clarity/Organization  Outcome: Ongoing (interventions implemented as appropriate)        "

## 2019-02-12 NOTE — PROGRESS NOTES
"Psychiatry Progress Note    Chief Complaint/Reason for follow-up: mood disorder, autism spectrum disorder, OCD and borderline personality     Interval History: The patient admits that her mood is \"so-so.\"  She admits that groups help her to focus off of worry and overwhelming thoughts.  She enjoyed art therapy.  She feels that the Risperdal helps her when she is feeling overwhelmed    Review of Systems:   Sleep:  Good, Appetite: Good and GI: No complaints    Vital Signs for the last 24 hours:  Temp:  [37 °C (98.6 °F)-37.2 °C (99 °F)] 37 °C (98.6 °F)  Heart Rate:  [78-86] 78  Resp:  [18] 18  BP: (116-134)/(56-71) 116/56    Medications  Scheduled    carBAMazepine 200 mg oral TID with meals   pantoprazole 20 mg oral BID   QUEtiapine 50 mg oral Nightly   risperiDONE 0.5 mg oral BID     PRN  hydrOXYzine  •  risperiDONE      MENTAL STATUS EXAM  Appearance: appropriate attire  Gait and Motor: slow  Speech: normal rate/rhythm/volume  Mood: depressed, anxious and sad  Affect: tearful  Associations: coherent  Thought Process: goal-directed  Thought Content: appropriate to situation and obsessions  Suicidality/Homicidality: denies  Judgement/Insight: help accepting  Orientation: day, month, year, type of place, name of place, city and situation  Memory: recalls recent events and recalls remote events  Attention: alert     Assessment/Plan  Unspecified mood (affective) disorder (CMS/HCC)   Assessment & Plan    38-year-old  female with history of mood disorder, autism spectrum disorder, OCD and borderline personality disorder admitted after worsening perseverative thoughts about death and dying.    Risperdal 0.5mg BID for mood stability  Additional 0.5mg tid prn anxiety / agitation  Tegretol 200mg TID for mood stability    Discontinue antidepressants for concern for activation  Discontinue benzodiazepines for poor response   Promote appropriate mindfulness techniques in response to obsessive anxiety provoking thoughts " and behaviors.        Patient seen and evaluated for approximately 25 minutes in therapy.    Ga Horne MD  2/12/2019

## 2019-02-12 NOTE — PLAN OF CARE
"Problem: Patient Care Overview (Adult)  Goal: Plan of Care Review  Outcome: Ongoing (interventions implemented as appropriate)   02/11/19 2112   Coping/Psychosocial   Patient Agreement with Plan of Care agrees   Plan of Care Review   Progress improving   Outcome Summary Erika has been visible on the unit and social with peers. She denies SI and HI and contracts for safety on the unit, but states that she is perseverating on death. She is upset hearing or thinking about the word \"death\" or ones similar to it, like \"suicidal\". She was also anxious this shift. Erika was encouraged to journal and practice positive thinking as a way to help manage her anxiety. She did do a crossword puzzle which she said helped. Pt went to wrap up group. Staff will continue to monitor and provide support.    Coping/Psychosocial   Plan Of Care Reviewed With patient         "

## 2019-02-13 PROCEDURE — 99232 SBSQ HOSP IP/OBS MODERATE 35: CPT | Performed by: PSYCHIATRY & NEUROLOGY

## 2019-02-13 PROCEDURE — 12400000 HC ROOM AND CARE SEMIPRIVATE PSYCH

## 2019-02-13 PROCEDURE — 63700000 HC SELF-ADMINISTRABLE DRUG: Performed by: PSYCHIATRY & NEUROLOGY

## 2019-02-13 RX ORDER — RISPERIDONE 0.5 MG/1
0.5 TABLET, ORALLY DISINTEGRATING ORAL DAILY
Status: DISCONTINUED | OUTPATIENT
Start: 2019-02-13 | End: 2019-02-14

## 2019-02-13 RX ORDER — RISPERIDONE 0.5 MG/1
1 TABLET, ORALLY DISINTEGRATING ORAL
Status: DISCONTINUED | OUTPATIENT
Start: 2019-02-14 | End: 2019-02-19

## 2019-02-13 RX ADMIN — PANTOPRAZOLE SODIUM 20 MG: 20 TABLET, DELAYED RELEASE ORAL at 08:26

## 2019-02-13 RX ADMIN — RISPERIDONE 0.5 MG: 0.5 TABLET, ORALLY DISINTEGRATING ORAL at 08:26

## 2019-02-13 RX ADMIN — CARBAMAZEPINE 200 MG: 200 TABLET ORAL at 16:53

## 2019-02-13 RX ADMIN — CARBAMAZEPINE 200 MG: 200 TABLET ORAL at 08:26

## 2019-02-13 RX ADMIN — RISPERIDONE 0.5 MG: 0.5 TABLET, ORALLY DISINTEGRATING ORAL at 08:28

## 2019-02-13 RX ADMIN — RISPERIDONE 0.5 MG: 0.5 TABLET, ORALLY DISINTEGRATING ORAL at 16:54

## 2019-02-13 RX ADMIN — PANTOPRAZOLE SODIUM 20 MG: 20 TABLET, DELAYED RELEASE ORAL at 16:53

## 2019-02-13 RX ADMIN — QUETIAPINE 50 MG: 50 TABLET, FILM COATED ORAL at 20:50

## 2019-02-13 RX ADMIN — CARBAMAZEPINE 200 MG: 200 TABLET ORAL at 12:24

## 2019-02-13 NOTE — PLAN OF CARE
"Problem: Patient Care Overview (Adult)  Goal: Plan of Care Review  Outcome: Ongoing (interventions implemented as appropriate)   02/13/19 1351   Coping/Psychosocial   Patient Agreement with Plan of Care agrees   Plan of Care Review   Progress progress towards functional goals is fair   Outcome Summary Erika required prn risperdal in am for intrusive thoughts. States she \"doesn't want to hurt herself\", so she doesn't understand why she \"is having thoughts of hurting herself\" States they are not even thoughts, she feels like she has no control over them. Able to attend all groups today. Tearful off and on, and is hien to be safe and not harm herself.    Coping/Psychosocial   Plan Of Care Reviewed With patient       Problem: Cognitive Impairment (Psychotic Signs/Symptoms) (Adult)  Goal: Improved Thought Clarity/Organization  Outcome: Ongoing (interventions implemented as appropriate)        "

## 2019-02-13 NOTE — PROGRESS NOTES
"Psychiatry Progress Note    Chief Complaint/Reason for follow-up: mood disorder, autism spectrum disorder, OCD and borderline personality     Interval History: The patient continues to describe her mood as \"so-so, \"making a hand motion to describe it.  She appears anxious and is verging on tears.  She says \"my goal today is to smile.\"  \"I do not know what to feel.\"  She has a difficult time describing what her worries are but appears insecure and states that she does not know \"what is going to happen or who is going to be there for me in the future.\"  She admits to worrying about parents and who is going to take care of her as she gets older.  She also says \"it is very hard for me to wrap my head around that life is temporary.\"  We discussed the idea of staying busy and keeping her mind occupied in order to reduce focusing on obsessive thoughts about death.  I advised the patient to do an art project if she feels anxious, or use guided meditation.      Sleep:  Good, Appetite: Good and GI: No complaints    Vital Signs for the last 24 hours:  Temp:  [36.9 °C (98.4 °F)-37 °C (98.6 °F)] 36.9 °C (98.4 °F)  Heart Rate:  [74-91] 74  Resp:  [18] 18  BP: (124-164)/(61-74) 140/66    Medications  Scheduled    carBAMazepine 200 mg oral TID with meals   pantoprazole 20 mg oral BID   QUEtiapine 50 mg oral Nightly   risperiDONE 0.5 mg oral Daily   [START ON 2/14/2019] risperiDONE 1 mg oral Daily before breakfast     PRN  hydrOXYzine  •  risperiDONE      Mental Status Exam:  Appearance: appropriate attire  Gait and Motor: slow  Speech: slowed  Mood: depressed, anxious, sad and Tearful  Affect: restricted, labile and anxious  Associations: logical  Thought Process: blocking  Thought Content: obsessions  Suicidality/Homicidality: thoughts of being dead/ no desire or plan to die  Judgement/Insight: help accepting  Orientation: day, year, season, type of place, name of place and situation  Memory: recalls recent events  Attention: " distracted      Assessment/Plan  Unspecified mood (affective) disorder (CMS/HCC)   Assessment & Plan    38-year-old  female with history of mood disorder, autism spectrum disorder, OCD and borderline personality disorder admitted after worsening perseverative thoughts about death and dying.    Risperdal 1 mg am, 0.5 mg 5pm for mood stability  Additional 0.5mg tid prn anxiety / agitation  Tegretol 200mg TID for mood stability    Discontinue antidepressants for concern for activation  Discontinue benzodiazepines for poor response   Promote appropriate mindfulness techniques in response to obsessive anxiety provoking thoughts and behaviors.        Patient seen and evaluated for approximately 25 minutes in therapy.  The patient is agreeable to increasing Risperdal to 1 mg in the morning and 0.5 mg at 5 PM.  She has been taking a as needed dose with her morning dose for the last few days.    Ga Horne MD  2/13/2019

## 2019-02-13 NOTE — PLAN OF CARE
"Problem: Patient Care Overview (Adult)  Goal: Plan of Care Review  Outcome: Ongoing (interventions implemented as appropriate)   02/12/19 2045   Coping/Psychosocial   Patient Agreement with Plan of Care agrees   Plan of Care Review   Progress no change   Outcome Summary Erika is visible on unit and social with peers. She denies SI and contracts for safety on the unit. She was very anxious at the beginning of the shift, asking \"what is wrong with me\". She reports she is still having obsessive thoughts over death. Coping skills were reviewed with the patient and she decided to do some word searches to help calm down. She did have a good visit with her parents this evening. She went to wrap up group. Staff will continue to monitor and provide support.    Coping/Psychosocial   Plan Of Care Reviewed With patient         "

## 2019-02-14 PROCEDURE — 12400000 HC ROOM AND CARE SEMIPRIVATE PSYCH

## 2019-02-14 PROCEDURE — 99232 SBSQ HOSP IP/OBS MODERATE 35: CPT | Performed by: PSYCHIATRY & NEUROLOGY

## 2019-02-14 PROCEDURE — 63700000 HC SELF-ADMINISTRABLE DRUG: Performed by: PSYCHIATRY & NEUROLOGY

## 2019-02-14 RX ORDER — VENLAFAXINE HYDROCHLORIDE 37.5 MG/1
37.5 CAPSULE, EXTENDED RELEASE ORAL
Status: DISCONTINUED | OUTPATIENT
Start: 2019-02-14 | End: 2019-02-22

## 2019-02-14 RX ORDER — QUETIAPINE FUMARATE 100 MG/1
100 TABLET, FILM COATED ORAL NIGHTLY
Status: DISCONTINUED | OUTPATIENT
Start: 2019-02-14 | End: 2019-02-27 | Stop reason: HOSPADM

## 2019-02-14 RX ORDER — RISPERIDONE 0.5 MG/1
1 TABLET, ORALLY DISINTEGRATING ORAL DAILY
Status: DISCONTINUED | OUTPATIENT
Start: 2019-02-14 | End: 2019-02-19

## 2019-02-14 RX ADMIN — RISPERIDONE 1 MG: 0.5 TABLET, ORALLY DISINTEGRATING ORAL at 17:19

## 2019-02-14 RX ADMIN — CARBAMAZEPINE 200 MG: 200 TABLET ORAL at 08:41

## 2019-02-14 RX ADMIN — CARBAMAZEPINE 200 MG: 200 TABLET ORAL at 17:19

## 2019-02-14 RX ADMIN — PANTOPRAZOLE SODIUM 20 MG: 20 TABLET, DELAYED RELEASE ORAL at 17:19

## 2019-02-14 RX ADMIN — QUETIAPINE FUMARATE 100 MG: 100 TABLET ORAL at 20:28

## 2019-02-14 RX ADMIN — CARBAMAZEPINE 200 MG: 200 TABLET ORAL at 12:37

## 2019-02-14 RX ADMIN — VENLAFAXINE HYDROCHLORIDE 37.5 MG: 37.5 CAPSULE, EXTENDED RELEASE ORAL at 12:37

## 2019-02-14 RX ADMIN — PANTOPRAZOLE SODIUM 20 MG: 20 TABLET, DELAYED RELEASE ORAL at 08:41

## 2019-02-14 RX ADMIN — HYDROXYZINE HYDROCHLORIDE 50 MG: 25 TABLET ORAL at 08:43

## 2019-02-14 RX ADMIN — RISPERIDONE 1 MG: 0.5 TABLET, ORALLY DISINTEGRATING ORAL at 07:14

## 2019-02-14 NOTE — PLAN OF CARE
"Problem: Patient Care Overview (Adult)  Goal: Plan of Care Review  Outcome: Ongoing (interventions implemented as appropriate)   02/13/19 2058   Coping/Psychosocial   Patient Agreement with Plan of Care agrees   Plan of Care Review   Progress improving   Outcome Summary Erika was visible on the unit today and social with peers. When asked if she was having thoughts of hurting herself she said \"kind of, but I don't want to act on them\". She does contract for safety on the unit. She is struggling with intrusive thoughts this shift, and said she does not know why she is having these thoughts. She was tearful at times. Pt had a good visit with her aunt today. She went to wrap up group. Staff will continue to monitor and provide support.    Coping/Psychosocial   Plan Of Care Reviewed With patient         "

## 2019-02-14 NOTE — PLAN OF CARE
Problem: Overarching Goals (Adult)  Goal: Adheres to Safety Considerations for Self and Others    Intervention: Develop/Maintain Individualized Safety Plan   02/14/19 1518   Develop/Maintain Individualized Safety Plan   Safety Measures self-directed behavior promoted       Goal: Optimized Coping Skills in Response to Life Stressors    Intervention: Promote Effective Coping Strategies   02/14/19 1518   Coping/Psychosocial Interventions   Supportive Measures self-responsibility promoted;relaxation techniques promoted       Goal: Develops/Participates in Therapeutic [place] to Support Successful Transition    Intervention: Foster Therapeutic [place]   02/14/19 1518   Psychosocial Support   Trust Relationship/Rapport questions encouraged

## 2019-02-14 NOTE — PROGRESS NOTES
"Psychiatry Progress Note    Chief Complaint/Reason for follow-up: mood disorder, autism spectrum disorder, OCD and borderline personality     Interval History: Patient continues to be tearful, states \"I am wondering if life is worth living.\"  She denies that she would attempt suicide here but acknowledges obsessing over death and dying.  During the course of our conversation, as I explained to the patient that the more she focuses on these thoughts the more overwhelming may become and that it is important to stay in the moment, the patient is able to settle down and stop crying and find calm.  Patient's parents are called and do not  the phone, a message is left.    Review of Systems:   Sleep:  Fair, Appetite: Good and GI: No complaints    Vital Signs for the last 24 hours:  Temp:  [36.7 °C (98 °F)-36.8 °C (98.2 °F)] 36.7 °C (98 °F)  Heart Rate:  [70-84] 84  Resp:  [18-20] 18  BP: (121-140)/(65-81) 121/65    Medications  Scheduled    carBAMazepine 200 mg oral TID with meals   pantoprazole 20 mg oral BID   QUEtiapine 100 mg oral Nightly   risperiDONE 1 mg oral Daily before breakfast   risperiDONE 1 mg oral Daily   venlafaxine XR 37.5 mg oral Daily with breakfast     PRN  hydrOXYzine  •  risperiDONE      Mental Status Exam:  Appearance: appropriate attire  Gait and Motor: slow  Speech: slowed  Mood: depressed, anxious, sad and Tearful  Affect: restricted, labile and anxious  Associations: logical  Thought Process: blocking  Thought Content: obsessions  Suicidality/Homicidality: thoughts of being dead/ no desire or plan to die  Judgement/Insight: help accepting  Orientation: day, year, season, type of place, name of place and situation  Memory: recalls recent events  Attention: distracted      Assessment/Plan  Unspecified mood (affective) disorder (CMS/HCC)   Assessment & Plan    38-year-old  female with history of mood disorder, autism spectrum disorder, OCD and borderline personality disorder admitted " after worsening perseverative thoughts about death and dying.    Risperdal 1 mg am, 1 mg 5pm for mood stability  Increase seroquel to 100mg HS for mood stability and insomnia  Additional risperdal 0.5mg tid prn anxiety / agitation  Tegretol 200mg TID for mood stability  Start Effexor XR 37.5mg for depression, anxiety    Discontinue benzodiazepines for poor response   Promote appropriate mindfulness techniques in response to obsessive anxiety provoking thoughts and behaviors.        Patient seen and evaluated for approximately 25 minutes in therapy.  I also called the patient's parents melanie and Puja Vaughan at [phone].  They did not answer the phone, and a message is left.  Ga Horne MD  2/14/2019

## 2019-02-14 NOTE — PLAN OF CARE
Problem: Patient Care Overview (Adult)  Goal: Plan of Care Review  Outcome: Ongoing (interventions implemented as appropriate)   02/14/19 0859   Coping/Psychosocial   Patient Agreement with Plan of Care agrees   Plan of Care Review   Progress no change   Outcome Summary  met with patient and her  Zahira Viera from [facility]. [phone].  is planning on stopping in again in a few days to meet with patient. [phone].   Coping/Psychosocial   Plan Of Care Reviewed With patient

## 2019-02-14 NOTE — PLAN OF CARE
"Problem: Patient Care Overview (Adult)  Goal: Plan of Care Review  Outcome: Ongoing (interventions implemented as appropriate)   02/14/19 1327   Coping/Psychosocial   Patient Agreement with Plan of Care agrees   Plan of Care Review   Progress improving   Outcome Summary Erika appeared anxious and tearful in am. Given atarax prn at 0830. Continues to perseverate on \"why am I having these thoughts\" Denies any intention of harming herself. Able to participate in all groups and eat meals with her peers. Actually somewhat less needy today, not up to nurses' station quite as much today. ADL's good.  Flat affect and anxious in appearance.    Coping/Psychosocial   Plan Of Care Reviewed With patient       Problem: Cognitive Impairment (Psychotic Signs/Symptoms) (Adult)  Goal: Improved Thought Clarity/Organization  Outcome: Ongoing (interventions implemented as appropriate)        "

## 2019-02-15 PROCEDURE — 99232 SBSQ HOSP IP/OBS MODERATE 35: CPT | Performed by: PSYCHIATRY & NEUROLOGY

## 2019-02-15 PROCEDURE — 63700000 HC SELF-ADMINISTRABLE DRUG: Performed by: PSYCHIATRY & NEUROLOGY

## 2019-02-15 PROCEDURE — 12400000 HC ROOM AND CARE SEMIPRIVATE PSYCH

## 2019-02-15 RX ADMIN — PANTOPRAZOLE SODIUM 20 MG: 20 TABLET, DELAYED RELEASE ORAL at 17:11

## 2019-02-15 RX ADMIN — QUETIAPINE FUMARATE 100 MG: 100 TABLET ORAL at 20:29

## 2019-02-15 RX ADMIN — CARBAMAZEPINE 200 MG: 200 TABLET ORAL at 17:11

## 2019-02-15 RX ADMIN — HYDROXYZINE HYDROCHLORIDE 50 MG: 25 TABLET ORAL at 15:45

## 2019-02-15 RX ADMIN — CARBAMAZEPINE 200 MG: 200 TABLET ORAL at 08:31

## 2019-02-15 RX ADMIN — RISPERIDONE 1 MG: 0.5 TABLET, ORALLY DISINTEGRATING ORAL at 07:19

## 2019-02-15 RX ADMIN — VENLAFAXINE HYDROCHLORIDE 37.5 MG: 37.5 CAPSULE, EXTENDED RELEASE ORAL at 08:31

## 2019-02-15 RX ADMIN — CARBAMAZEPINE 200 MG: 200 TABLET ORAL at 12:28

## 2019-02-15 RX ADMIN — RISPERIDONE 0.5 MG: 0.5 TABLET, ORALLY DISINTEGRATING ORAL at 09:20

## 2019-02-15 RX ADMIN — PANTOPRAZOLE SODIUM 20 MG: 20 TABLET, DELAYED RELEASE ORAL at 08:31

## 2019-02-15 RX ADMIN — HYDROXYZINE HYDROCHLORIDE 50 MG: 25 TABLET ORAL at 09:17

## 2019-02-15 RX ADMIN — RISPERIDONE 1 MG: 0.5 TABLET, ORALLY DISINTEGRATING ORAL at 08:32

## 2019-02-15 RX ADMIN — RISPERIDONE 1 MG: 0.5 TABLET, ORALLY DISINTEGRATING ORAL at 17:11

## 2019-02-15 NOTE — PLAN OF CARE
"Problem: Patient Care Overview (Adult)  Goal: Plan of Care Review  Outcome: Ongoing (interventions implemented as appropriate)   02/14/19 2034   Coping/Psychosocial   Patient Agreement with Plan of Care agrees   Plan of Care Review   Progress improving   Outcome Summary Erika is visible on the unit and social with peers. She denies SI and contracts for safety on the unit. She is still perseverating on death, life, and her intrusive thoughts. She says she keeps thinking \"why am I having these thoughts\". She states that her doctor told her to be more in the present, and she said that talking with her peers helps her do that. She had a good visit with her parents this evening. She went to wrap up group. Staff will continue to monitor and provide support.    Coping/Psychosocial   Plan Of Care Reviewed With patient         "

## 2019-02-15 NOTE — PROGRESS NOTES
"Psychiatry Progress Note    Chief Complaint/Reason for follow-up: mood disorder, autism spectrum disorder, OCD and borderline personality     Interval History: The patient was isolative today and needed a lot of reassurance. She shared that she feels tired. She stated, \"maybe I will go to another facility because I'm not getting better.\" She was crying but over the course of the meeting she was able to compose herself. With help she was able to identify some coping skills and work on smaller more manageable goals (for example, sitting in the day room for 5 minutes and doing one crossword puzzle). She voiced some suicidal thoughts but no intent or plan to harm herself. She was irritable about the thoughts and asked repeatedly \"why am I having these thoughts?\" She stated she feels depressed. She was able to state that her family is a reason to continue living. She was unable to think about or discuss any deeper dynamic issues. She was able to eat her meals. She participated in some groups.     Review of Systems:   Sleep:  Fair, Appetite: Good and GI: No complaints    Vital Signs for the last 24 hours:  Temp:  [36.7 °C (98 °F)-36.7 °C (98.1 °F)] 36.7 °C (98 °F)  Heart Rate:  [78-98] 80  Resp:  [18] 18  BP: (127-137)/(62-80) 131/63    Medications  Scheduled    carBAMazepine 200 mg oral TID with meals   pantoprazole 20 mg oral BID   QUEtiapine 100 mg oral Nightly   risperiDONE 1 mg oral Daily before breakfast   risperiDONE 1 mg oral Daily   venlafaxine XR 37.5 mg oral Daily with breakfast     PRN  hydrOXYzine  •  risperiDONE      Mental Status Exam:  Appearance: appropriate attire, looks tired   Gait and Motor: slow  Speech: slowed  Mood: depressed, anxious  Affect: restricted, labile and anxious  Associations: logical  Thought Process: ruminates, linear  Thought Content: obsessions  Suicidality/Homicidality: thoughts of being dead/ no desire or plan to die  Judgement/Insight: help accepting  Orientation: day, year, " season, type of place, name of place and situation  Memory: recalls recent events  Attention: distracted      Assessment/Plan  38-year-old  female with history of mood disorder, autism spectrum disorder, OCD and borderline personality disorder admitted after worsening perseverative thoughts about death and dying    Continue Risperdal 1 mg am, 1 mg 5pm for mood stability  Continue Seroquel 100mg HS for mood stability and insomnia  Continue Risperdal 0.5mg tid prn anxiety / agitation  Continue Tegretol 200mg TID for mood stability  Continue Effexor XR 37.5mg for depression, anxiety  Promote structure and routine    Muriel Yeboah MD  2/15/2019  4:23 PM    Time spent: 25 minutes, with >50% of the time spent on counseling and coordination of care

## 2019-02-15 NOTE — PLAN OF CARE
"Problem: Patient Care Overview (Adult)  Goal: Plan of Care Review  Outcome: Ongoing (interventions implemented as appropriate)   02/15/19 1056   Coping/Psychosocial   Patient Agreement with Plan of Care agrees with comment (describe)   Plan of Care Review   Progress improving   Outcome Summary Erika began the shift tearful and scared. Continues to have obsessive thoughts about obsessive thoughts. \"Why am I having these thoughts!\" Tearful, given prn of atarax and risperdal. Offered meditation and music with headphones, but quickly gave up on both of these options. Directed to keep busy by participating in upcoming groups. Needed to be strongly encouraged, since Erika wanted to retreat to room. Once in group was able to participate. Does better when given tasks to do and limited choices. Will continue to given jobs/tasks to do rest of day. Denies any intent to act on any thoughts of self-harm, but does acknowledge that she continues to have \"thoughts of death\". Eating well and compliant with meds.    Coping/Psychosocial   Plan Of Care Reviewed With patient       Problem: Cognitive Impairment (Psychotic Signs/Symptoms) (Adult)  Goal: Improved Thought Clarity/Organization  Outcome: Ongoing (interventions implemented as appropriate)   02/15/19 1056   Improved Thought Clarity/Organization (Psychotic)   Individual Goal Erika will identify 2 activities that she can do to help distract herself from intrusive thoughts         "

## 2019-02-16 PROCEDURE — 12400000 HC ROOM AND CARE SEMIPRIVATE PSYCH

## 2019-02-16 PROCEDURE — 63700000 HC SELF-ADMINISTRABLE DRUG: Performed by: PSYCHIATRY & NEUROLOGY

## 2019-02-16 PROCEDURE — 99232 SBSQ HOSP IP/OBS MODERATE 35: CPT | Performed by: PSYCHIATRY & NEUROLOGY

## 2019-02-16 RX ADMIN — PANTOPRAZOLE SODIUM 20 MG: 20 TABLET, DELAYED RELEASE ORAL at 08:39

## 2019-02-16 RX ADMIN — QUETIAPINE FUMARATE 100 MG: 100 TABLET ORAL at 20:15

## 2019-02-16 RX ADMIN — CARBAMAZEPINE 200 MG: 200 TABLET ORAL at 16:53

## 2019-02-16 RX ADMIN — RISPERIDONE 1 MG: 0.5 TABLET, ORALLY DISINTEGRATING ORAL at 07:30

## 2019-02-16 RX ADMIN — RISPERIDONE 1 MG: 0.5 TABLET, ORALLY DISINTEGRATING ORAL at 16:53

## 2019-02-16 RX ADMIN — CARBAMAZEPINE 200 MG: 200 TABLET ORAL at 12:26

## 2019-02-16 RX ADMIN — CARBAMAZEPINE 200 MG: 200 TABLET ORAL at 08:39

## 2019-02-16 RX ADMIN — VENLAFAXINE HYDROCHLORIDE 37.5 MG: 37.5 CAPSULE, EXTENDED RELEASE ORAL at 08:39

## 2019-02-16 RX ADMIN — PANTOPRAZOLE SODIUM 20 MG: 20 TABLET, DELAYED RELEASE ORAL at 16:53

## 2019-02-16 NOTE — PLAN OF CARE
"Problem: Patient Care Overview (Adult)  Goal: Plan of Care Review  Outcome: Ongoing (interventions implemented as appropriate)   02/15/19 2031   Coping/Psychosocial   Patient Agreement with Plan of Care agrees   Plan of Care Review   Progress no change   Outcome Summary rEika was visible on the unit and minimally social with peers. She was obsessing over her negative thoughts and fear of developing self harm thoughts. When asked if she was having thoughts of hurting herself she said \"no, but what if by saying no I'm lying?\". She does contract for safety on the unit. She is also perseverating on being asked about thoughts of self harm, stating \"I'm worried about what I might say the next time someone asks me if I want to hurt myself, I don't know what to say\". Various coping skills, such as stress balls, positive affirmations, and diversional activities were reviewed with the patient. She was tearful at times. Pt went to wrap up group. Staff will continue to monitor and encourage positive coping skills.    Coping/Psychosocial   Plan Of Care Reviewed With patient         "

## 2019-02-16 NOTE — PLAN OF CARE
"Problem: Patient Care Overview (Adult)  Goal: Plan of Care Review  Outcome: Ongoing (interventions implemented as appropriate)   02/16/19 1224   Coping/Psychosocial   Patient Agreement with Plan of Care agrees   Plan of Care Review   Progress progress toward functional goals is gradual   Outcome Summary Erika has been visible on unit. Attending groups. Minimally social with peers. Still saying she is having intrusive thoughts to hurt herself, but doesn't want to hurt self. Continues to focus on death, worrying about it, can't stop thinking about it. . Stated she not sure if she doing and saying these things for attention. Asked, \"why do I exist?\" feels hopeless. encouraged Erika to try iand stay in the moment stay focused on what she is doing, group, crossword puzzles might help minimize her intrusive thoughts. Does state she has friends, goes to the gym, works 3 hours a day. will continue to promote healthy coping skills    Coping/Psychosocial   Plan Of Care Reviewed With patient       Problem: Cognitive Impairment (Psychotic Signs/Symptoms) (Adult)  Goal: Improved Thought Clarity/Organization  Outcome: Ongoing (interventions implemented as appropriate)        "

## 2019-02-16 NOTE — PROGRESS NOTES
"Psychiatry Progress Note    Chief Complaint/Reason for follow-up: mood disorder, autism spectrum disorder, OCD and borderline personality     Interval History: Today, she states that she continues to feel \"just blah.\"  She continues to have intrusive thoughts about death, and this is worrying her a great deal.  She has been trying to practice mindfulness but this is difficult.  She was able to identify a goal of going to a group today.    Review of Systems:   Sleep:  Fair, Appetite: Good and GI: No complaints    Vital Signs for the last 24 hours:  Temp:  [36.4 °C (97.6 °F)-36.8 °C (98.3 °F)] 36.4 °C (97.6 °F)  Heart Rate:  [76-93] 84  Resp:  [14-18] 14  BP: (119-175)/(53-66) 175/66    Medications  Scheduled    carBAMazepine 200 mg oral TID with meals   pantoprazole 20 mg oral BID   QUEtiapine 100 mg oral Nightly   risperiDONE 1 mg oral Daily before breakfast   risperiDONE 1 mg oral Daily   venlafaxine XR 37.5 mg oral Daily with breakfast     PRN  hydrOXYzine  •  risperiDONE      Mental Status Exam:  Appearance: unkempt  Gait and Motor: slow  Speech: slowed  Mood: depressed, anxious  Affect: restricted and anxious  Associations: logical  Thought Process: ruminates, linear  Thought Content: obsessions  Suicidality/Homicidality: thoughts of being dead/ no desire or plan to die  Judgement/Insight: help accepting  Orientation: day, year, season, type of place, name of place and situation  Memory: recalls recent events  Attention: distracted      Assessment/Plan  38-year-old  female with history of mood disorder, autism spectrum disorder, OCD and borderline personality disorder admitted after worsening perseverative thoughts about death and dying    Continue Risperdal 1 mg am, 1 mg 5pm for mood stability  Continue Seroquel 100mg HS for mood stability and insomnia  Continue Risperdal 0.5mg tid prn anxiety / agitation  Continue Tegretol 200mg TID for mood stability  Continue Effexor XR 37.5mg for depression, " anxiety  Promote structure and routine  Continue to emphasize mindfulness    Cecy Mane MD  2/16/2019  4:23 PM

## 2019-02-17 PROCEDURE — 99232 SBSQ HOSP IP/OBS MODERATE 35: CPT | Performed by: PSYCHIATRY & NEUROLOGY

## 2019-02-17 PROCEDURE — 12400000 HC ROOM AND CARE SEMIPRIVATE PSYCH

## 2019-02-17 PROCEDURE — 63700000 HC SELF-ADMINISTRABLE DRUG: Performed by: PSYCHIATRY & NEUROLOGY

## 2019-02-17 RX ADMIN — HYDROXYZINE HYDROCHLORIDE 50 MG: 25 TABLET ORAL at 16:43

## 2019-02-17 RX ADMIN — CARBAMAZEPINE 200 MG: 200 TABLET ORAL at 12:33

## 2019-02-17 RX ADMIN — QUETIAPINE FUMARATE 100 MG: 100 TABLET ORAL at 20:11

## 2019-02-17 RX ADMIN — RISPERIDONE 1 MG: 0.5 TABLET, ORALLY DISINTEGRATING ORAL at 16:43

## 2019-02-17 RX ADMIN — PANTOPRAZOLE SODIUM 20 MG: 20 TABLET, DELAYED RELEASE ORAL at 08:41

## 2019-02-17 RX ADMIN — HYDROXYZINE HYDROCHLORIDE 50 MG: 25 TABLET ORAL at 08:46

## 2019-02-17 RX ADMIN — VENLAFAXINE HYDROCHLORIDE 37.5 MG: 37.5 CAPSULE, EXTENDED RELEASE ORAL at 08:41

## 2019-02-17 RX ADMIN — PANTOPRAZOLE SODIUM 20 MG: 20 TABLET, DELAYED RELEASE ORAL at 16:43

## 2019-02-17 RX ADMIN — CARBAMAZEPINE 200 MG: 200 TABLET ORAL at 16:43

## 2019-02-17 RX ADMIN — CARBAMAZEPINE 200 MG: 200 TABLET ORAL at 08:41

## 2019-02-17 RX ADMIN — RISPERIDONE 1 MG: 0.5 TABLET, ORALLY DISINTEGRATING ORAL at 07:42

## 2019-02-17 NOTE — PLAN OF CARE
"Problem: Patient Care Overview (Adult)  Goal: Plan of Care Review  Outcome: Ongoing (interventions implemented as appropriate)   02/17/19 1309   Coping/Psychosocial   Patient Agreement with Plan of Care agrees   Plan of Care Review   Progress progress toward functional goals is gradual   Outcome Summary Erika has been very perservative and needy this shift. Talking about death, \"I don't want to think these thoughts, maybe I'm doing it for attention, maybe I'm lying, I dont know\". Labile, tearful. Unsure if she wants her family to visit, \"what will I talk about\". Help rejecting, advised Erika to stay busy, crosswords, or joining in with her peers doing a puzzle.Erika would just walk away. Denies wanting to hurt herself, but continues to make comments about \"why am I here, existing\". Endorses anxiety and depression denies SI. Will continue to monitpor and give support.   Coping/Psychosocial   Plan Of Care Reviewed With patient       Problem: Cognitive Impairment (Psychotic Signs/Symptoms) (Adult)  Goal: Improved Thought Clarity/Organization  Outcome: Ongoing (interventions implemented as appropriate)        "

## 2019-02-17 NOTE — PLAN OF CARE
Problem: Patient Care Overview (Adult)  Goal: Plan of Care Review  Outcome: Ongoing (interventions implemented as appropriate)   02/16/19 2141   Plan of Care Review   Outcome Summary Pt was intermittently visible throughout the shift. Pt was observed doing a word search. Denies SI/HI. Was able to contract for safety. However, pt was perseverating on fear of dying when she gets older. Tearful, redirectable. Talked about coping skills with this writer and living in the moment. Positive visit with parents. Will continue to monitor pt for safe behaviors and support as needed.

## 2019-02-17 NOTE — PROGRESS NOTES
"Psychiatry Progress Note    Chief Complaint/Reason for follow-up: mood disorder, autism spectrum disorder, OCD and borderline personality     Interval History: Today, she states that she continues to feel worried about death and that \"if I say what I really think I'll regret it.\"  She reports difficulty concentrating in groups because of obsessive thoughts about being dead, but then states that if tries to be mindful she can focus on the group activities.    Review of Systems:   Sleep:  Fair, Appetite: Good and GI: No complaints    Vital Signs for the last 24 hours:  Temp:  [36.7 °C (98 °F)-36.8 °C (98.3 °F)] 36.8 °C (98.3 °F)  Heart Rate:  [77-90] 83  Resp:  [16] 16  BP: (130-137)/(66-71) 130/67    Medications  Scheduled    carBAMazepine 200 mg oral TID with meals   pantoprazole 20 mg oral BID   QUEtiapine 100 mg oral Nightly   risperiDONE 1 mg oral Daily before breakfast   risperiDONE 1 mg oral Daily   venlafaxine XR 37.5 mg oral Daily with breakfast     PRN  hydrOXYzine  •  risperiDONE      Mental Status Exam:  Appearance: unkempt  Gait and Motor: slow  Speech: slowed  Mood: depressed, anxious  Affect: restricted and anxious  Associations: coherent  Thought Process: ruminates, linear  Thought Content: obsessions  Suicidality/Homicidality: thoughts of being dead/ no desire or plan to die  Judgement/Insight: help accepting  Orientation: day, year, season, type of place, name of place and situation  Memory: recalls recent events  Attention: distracted      Assessment/Plan  38-year-old  female with history of mood disorder, autism spectrum disorder, OCD and borderline personality disorder admitted after worsening perseverative thoughts about death and dying.  She continues to have obsessive thoughts over the weekend although has some insight into her symptoms and is working on grounding techniques.    Continue Risperdal 1 mg am, 1 mg 5pm for mood stability  Continue Seroquel 100mg HS for mood stability and " insomnia  Continue Risperdal 0.5mg tid prn anxiety / agitation  Continue Tegretol 200mg TID for mood stability  Continue Effexor XR 37.5mg for depression, anxiety  Promote structure and routine  Continue to emphasize mindfulness, relaxation techniques    Cecy Mane MD  2/17/2019  4:23 PM

## 2019-02-18 PROCEDURE — 63700000 HC SELF-ADMINISTRABLE DRUG: Performed by: PSYCHIATRY & NEUROLOGY

## 2019-02-18 PROCEDURE — 99232 SBSQ HOSP IP/OBS MODERATE 35: CPT | Performed by: PSYCHIATRY & NEUROLOGY

## 2019-02-18 PROCEDURE — 12400000 HC ROOM AND CARE SEMIPRIVATE PSYCH

## 2019-02-18 RX ADMIN — CARBAMAZEPINE 200 MG: 200 TABLET ORAL at 12:03

## 2019-02-18 RX ADMIN — CARBAMAZEPINE 200 MG: 200 TABLET ORAL at 08:12

## 2019-02-18 RX ADMIN — PANTOPRAZOLE SODIUM 20 MG: 20 TABLET, DELAYED RELEASE ORAL at 17:18

## 2019-02-18 RX ADMIN — RISPERIDONE 1 MG: 0.5 TABLET, ORALLY DISINTEGRATING ORAL at 08:13

## 2019-02-18 RX ADMIN — VENLAFAXINE HYDROCHLORIDE 37.5 MG: 37.5 CAPSULE, EXTENDED RELEASE ORAL at 08:12

## 2019-02-18 RX ADMIN — QUETIAPINE FUMARATE 100 MG: 100 TABLET ORAL at 20:32

## 2019-02-18 RX ADMIN — RISPERIDONE 1 MG: 0.5 TABLET, ORALLY DISINTEGRATING ORAL at 17:18

## 2019-02-18 RX ADMIN — PANTOPRAZOLE SODIUM 20 MG: 20 TABLET, DELAYED RELEASE ORAL at 08:12

## 2019-02-18 RX ADMIN — CARBAMAZEPINE 200 MG: 200 TABLET ORAL at 17:17

## 2019-02-18 NOTE — PLAN OF CARE
Problem: Overarching Goals (Adult)  Goal: Optimized Coping Skills in Response to Life Stressors  Outcome: Ongoing (interventions implemented as appropriate)   02/18/19 1121   Overarching Goals   Optimized Coping Skills in Response to Life Stressors making progress toward outcome   Individual Goal  will assist patient with finding outpatient program with cbt oriented therapy.     Goal: Develops/Participates in Therapeutic [place] to Support Successful Transition  Outcome: Ongoing (interventions implemented as appropriate)   02/18/19 1121   Overarching Goals   Develops/Participates in Therapeutic [place] to Support Successful Transition making progress toward outcome   Individual Goal Patient will continue to attend social work group.

## 2019-02-18 NOTE — PROGRESS NOTES
"Psychiatry Progress Note    Chief Complaint/Reason for follow-up: mood disorder, autism spectrum disorder, OCD and borderline personality     Interval History: The patient has been very perseverative and attention seeking. \"I am going backward, I am hopeless.\" She remains tearful and obsessive about \"negative thoughts like being dead.\" She continues to note that her family is her reason for living. She needs constant assistance to try to stay engaged in an activity. She reported anxiety and passive wishes to die but also fear about dying. She stated that her main complaint is being bothered by obsessional thoughts. She stated, \"I can't get death or hurting myself out my head.\" She denied any plan or intention to harm herself.     Patient shared that she last felt well for about 5 weeks over [place]. She described being more socially connected then, but was not able to see this even when it was directly pointed out to her.     Coping skills to manage distress were reviewed. I also attempted to practice these skills with her but she was reluctant and seemed uninterested.     Review of Systems:   Sleep:  Fair, Appetite: Good and GI: No complaints    Vital Signs for the last 24 hours:  Temp:  [36.6 °C (97.8 °F)-36.9 °C (98.4 °F)] 36.6 °C (97.8 °F)  Heart Rate:  [80-85] 80  Resp:  [18] 18  BP: (129-153)/(62-75) 129/62    Medications  Scheduled    carBAMazepine 200 mg oral TID with meals   pantoprazole 20 mg oral BID   QUEtiapine 100 mg oral Nightly   risperiDONE 1 mg oral Daily before breakfast   risperiDONE 1 mg oral Daily   venlafaxine XR 37.5 mg oral Daily with breakfast     PRN  hydrOXYzine  •  risperiDONE      Mental Status Exam:  Appearance: appropriate attire, looks tired   Gait and Motor: slow  Speech: slowed  Mood: depressed, anxious  Affect: restricted, labile and anxious  Associations: logical  Thought Process: ruminates, linear  Thought Content: obsessions  Suicidality/Homicidality: thoughts of being dead/ " "no desire or plan to die  Judgement/Insight: help accepting but seems committed to \"sick role\"  Orientation: day, year, season, type of place, name of place and situation  Memory: recalls recent events  Attention: distracted      Assessment/Plan  38-year-old  female with history of mood disorder, autism spectrum disorder, OCD and borderline personality disorder admitted after worsening perseverative thoughts about death and dying    Continue Risperdal 1 mg am, 1 mg 5pm for mood stability  Continue Seroquel 100mg HS for mood stability and insomnia  Continue Risperdal 0.5mg tid prn anxiety / agitation  Continue Tegretol 200mg TID for mood stability  Continue Effexor XR 37.5mg for depression, anxiety  Promote structure and routine, encourage coping skills       Muriel Yeboah MD  2/18/2019  11:21 AM    Time spent: 25 minutes, with >50% of the time spent on counseling and coordination of care     "

## 2019-02-18 NOTE — PLAN OF CARE
"Problem: Patient Care Overview (Adult)  Goal: Plan of Care Review  Outcome: Ongoing (interventions implemented as appropriate)   02/18/19 1051   Coping/Psychosocial   Patient Agreement with Plan of Care agrees   Plan of Care Review   Progress progress toward functional goals is gradual   Outcome Summary Erika appears slightly less anxious this am. Requested to take meds a little early. Able to eat 100% of breakfast and voluntarily attended and partiicipated in community mtg and group. Sits by herself at mealtime. Did not c/o anxiety or \"thoughts of death\" yet today. Did not appear anxious. Blunted affect, but not tearful yet today.  Denies urges to harm self.   Coping/Psychosocial   Plan Of Care Reviewed With patient       Problem: Cognitive Impairment (Psychotic Signs/Symptoms) (Adult)  Goal: Improved Thought Clarity/Organization  Outcome: Ongoing (interventions implemented as appropriate)        "

## 2019-02-18 NOTE — CONSULTS
"Clinical Nutrition Note    Visit description  Pt screened secondary to LOS.      PMH  Mood D/O, autism spectrum D/O, OCD, borderline personality D/O    Ht/Wt/BMI  64\"  198.8#  34.12 (obesity)    Medications  No nutritionally significant medications    Intake  Mostly 100% recorded intake    Current diet  regular    Recommendations  Continue regular diet    Clinical Nutritional Status  Not nutritionally compromised at this time.  No follow-up needed.  Please consult if needed.    Allyssa Hoyos RD        "

## 2019-02-19 LAB
BACTERIA URNS QL MICRO: 2 /HPF
BILIRUB UR QL STRIP.AUTO: NEGATIVE MG/DL
CLARITY UR REFRACT.AUTO: ABNORMAL
COLOR UR AUTO: YELLOW
GLUCOSE UR STRIP.AUTO-MCNC: NEGATIVE MG/DL
HGB UR QL STRIP.AUTO: NEGATIVE
HYALINE CASTS #/AREA URNS LPF: ABNORMAL /LPF
KETONES UR STRIP.AUTO-MCNC: NEGATIVE MG/DL
LEUKOCYTE ESTERASE UR QL STRIP.AUTO: 2
NITRITE UR QL STRIP.AUTO: NEGATIVE
PH UR STRIP.AUTO: 6.5 [PH]
PROT UR QL STRIP.AUTO: NEGATIVE
RBC #/AREA URNS HPF: ABNORMAL /HPF
SP GR UR REFRACT.AUTO: 1.03
SQUAMOUS URNS QL MICRO: 1 /HPF
UROBILINOGEN UR STRIP-ACNC: 0.2 EU/DL
WBC #/AREA URNS HPF: ABNORMAL /HPF

## 2019-02-19 PROCEDURE — 99233 SBSQ HOSP IP/OBS HIGH 50: CPT | Performed by: PSYCHIATRY & NEUROLOGY

## 2019-02-19 PROCEDURE — 63700000 HC SELF-ADMINISTRABLE DRUG: Performed by: PSYCHIATRY & NEUROLOGY

## 2019-02-19 PROCEDURE — 12400000 HC ROOM AND CARE SEMIPRIVATE PSYCH

## 2019-02-19 PROCEDURE — 81003 URINALYSIS AUTO W/O SCOPE: CPT | Performed by: PSYCHIATRY & NEUROLOGY

## 2019-02-19 RX ORDER — RISPERIDONE 0.5 MG/1
1.5 TABLET, ORALLY DISINTEGRATING ORAL
Status: DISCONTINUED | OUTPATIENT
Start: 2019-02-20 | End: 2019-02-27 | Stop reason: HOSPADM

## 2019-02-19 RX ORDER — RISPERIDONE 0.5 MG/1
1.5 TABLET, ORALLY DISINTEGRATING ORAL DAILY
Status: DISCONTINUED | OUTPATIENT
Start: 2019-02-19 | End: 2019-02-27 | Stop reason: HOSPADM

## 2019-02-19 RX ORDER — RISPERIDONE 0.5 MG/1
1 TABLET, ORALLY DISINTEGRATING ORAL ONCE
Status: COMPLETED | OUTPATIENT
Start: 2019-02-19 | End: 2019-02-19

## 2019-02-19 RX ADMIN — RISPERIDONE 1 MG: 0.5 TABLET, ORALLY DISINTEGRATING ORAL at 07:22

## 2019-02-19 RX ADMIN — RISPERIDONE 1.5 MG: 0.5 TABLET, ORALLY DISINTEGRATING ORAL at 17:55

## 2019-02-19 RX ADMIN — CARBAMAZEPINE 200 MG: 200 TABLET ORAL at 08:25

## 2019-02-19 RX ADMIN — HYDROXYZINE HYDROCHLORIDE 50 MG: 25 TABLET ORAL at 08:25

## 2019-02-19 RX ADMIN — CARBAMAZEPINE 200 MG: 200 TABLET ORAL at 17:55

## 2019-02-19 RX ADMIN — PANTOPRAZOLE SODIUM 20 MG: 20 TABLET, DELAYED RELEASE ORAL at 17:55

## 2019-02-19 RX ADMIN — RISPERIDONE 1 MG: 0.5 TABLET, ORALLY DISINTEGRATING ORAL at 11:15

## 2019-02-19 RX ADMIN — QUETIAPINE FUMARATE 100 MG: 100 TABLET ORAL at 20:37

## 2019-02-19 RX ADMIN — CARBAMAZEPINE 200 MG: 200 TABLET ORAL at 12:21

## 2019-02-19 RX ADMIN — PANTOPRAZOLE SODIUM 20 MG: 20 TABLET, DELAYED RELEASE ORAL at 08:25

## 2019-02-19 RX ADMIN — RISPERIDONE 0.5 MG: 0.5 TABLET, ORALLY DISINTEGRATING ORAL at 08:25

## 2019-02-19 RX ADMIN — VENLAFAXINE HYDROCHLORIDE 37.5 MG: 37.5 CAPSULE, EXTENDED RELEASE ORAL at 08:25

## 2019-02-19 NOTE — PLAN OF CARE
"Problem: Patient Care Overview (Adult)  Goal: Plan of Care Review  Outcome: Ongoing (interventions implemented as appropriate)   02/19/19 1001   Coping/Psychosocial   Patient Agreement with Plan of Care agrees   Plan of Care Review   Progress progress towards functional goals is fair   Outcome Summary Erika was anxious first thing this am. Reports that \"everyone asking me about my intrusive thoughts makes me have intrusive thoughts\". Reassured patient that we could find some alternative assessment that she would find more helpfu. Given meditation, regular and prn medication. Patient was able to use meditation in recliner, and was able to rejoin community. Presently in group. Does perseverate on \"not being able to get better\". Needed to be redirected to attend am group. Was overwhelmed withcollage task , so therapist is giving alternative activity. Continues to c/o \" intrusive thoughts\" and is holding ears closed.    Coping/Psychosocial   Plan Of Care Reviewed With patient       Problem: Cognitive Impairment (Psychotic Signs/Symptoms) (Adult)  Goal: Improved Thought Clarity/Organization  Outcome: Ongoing (interventions implemented as appropriate)        "

## 2019-02-19 NOTE — PROGRESS NOTES
"Psychiatry Progress Note    Chief Complaint/Reason for follow-up: Mood disorder, autism spectrum disorder, OCD and borderline personality, dependant traits.    Interval History: Patient remains negativistic and obsessive about negative thoughts.  She is tearful and says \"why do I keep thinking like this?\"  She shares that staff asking her about her thoughts makes them worse.  We practiced a guided meditation technique.  I asked the patient to close her eyes and slowly breath in through her nose and out through her mouth, counting with each breath starting from 100 and going down.  During this exercise, the patient appears calm and composed and quiet.  She is able to fully engage in this experience.  After approximately 7 or 8 minutes, I asked the patient to open her eyes.  I asked her how she feels and she says \"a little bit calmer.\"  We discussed practicing this technique daily.  She is also agreeable to increasing Risperdal to 1.5 mg in the morning and in the evening, and continuing with Seroquel 100 mg at bedtime, all for mood stability.    Review of Systems:   Sleep:  Good, Appetite: Good and GI: No complaints    Vital Signs for the last 24 hours:  Temp:  [36.8 °C (98.3 °F)-37.1 °C (98.8 °F)] 37.1 °C (98.8 °F)  Heart Rate:  [74-90] 85  Resp:  [18] 18  BP: (119-139)/(64-68) 119/64    Medications  Scheduled    carBAMazepine 200 mg oral TID with meals   pantoprazole 20 mg oral BID   QUEtiapine 100 mg oral Nightly   [START ON 2/20/2019] risperiDONE 1.5 mg oral Daily before breakfast   risperiDONE 1.5 mg oral Daily   venlafaxine XR 37.5 mg oral Daily with breakfast     PRN  hydrOXYzine  •  risperiDONE      Mental Status Exam:  Appearance: appropriate attire  Gait and Motor: slow  Speech: normal rate/rhythm/volume  Mood: anxious and Overwhelmed and obsessive  Affect: anxious and Tearful  Associations: Obsessive   Thought Process: perseveration, obsessive and Negativistic  Thought Content: obsessions and " misperceptions  Suicidality/Homicidality: thoughts of being dead/ no desire or plan to die  Judgement/Insight: help accepting and makes choices that perpetuate illness  Orientation: month, year, type of place and name of place  Memory: recalls recent events and recalls remote events  Attention: withdrawn      Assessment/Plan  Unspecified mood (affective) disorder (CMS/HCC)   Assessment & Plan    38-year-old  female with history of mood disorder, autism spectrum disorder, OCD and borderline personality disorder admitted after worsening perseverative thoughts about death and dying.    Increase Risperdal to 1.5 mg am, 1.5 mg 5pm for mood stability  Increase seroquel to 100mg HS for mood stability and insomnia  Additional risperdal 0.5mg tid prn anxiety / agitation  Tegretol 200mg TID for mood stability  Start Effexor XR 37.5mg for depression, anxiety    Discontinue benzodiazepines for poor response   Promote appropriate mindfulness techniques in response to obsessive anxiety provoking thoughts and behaviors.        Patient seen and evaluated for approximately 25 minutes in therapy.  We agreed to use an additional 1 mg of Risperdal this morning at approximately 11:00 for obsessive anxious thoughts.    Ga Horne MD  2/19/2019

## 2019-02-19 NOTE — PLAN OF CARE
"Problem: Patient Care Overview (Adult)  Goal: Plan of Care Review  Outcome: Ongoing (interventions implemented as appropriate)   02/18/19 2130   Coping/Psychosocial   Patient Agreement with Plan of Care agrees   Plan of Care Review   Progress improving   Outcome Summary Erika was visible on the unit and minimally social with peers. She denies SI and contracts for safety on the unit. She still gets increased anxiety when she gets asked if she having suicidal or self harm thoughts, because then she begins focusing on it. She said that today she felt \"down in the dumps\". She had RN print out a volume dial because her doctor today told her to picture \"dialing down\" her anxiety and intrusive thoughts. She said she was having trouble picturing it so wanted a photo. Pt went to wrap up group. Staff will continue to monitor and provide support.   Coping/Psychosocial   Plan Of Care Reviewed With patient         "

## 2019-02-20 PROCEDURE — 99233 SBSQ HOSP IP/OBS HIGH 50: CPT | Performed by: PSYCHIATRY & NEUROLOGY

## 2019-02-20 PROCEDURE — 63700000 HC SELF-ADMINISTRABLE DRUG: Performed by: PSYCHIATRY & NEUROLOGY

## 2019-02-20 PROCEDURE — 12400000 HC ROOM AND CARE SEMIPRIVATE PSYCH

## 2019-02-20 RX ADMIN — RISPERIDONE 1.5 MG: 0.5 TABLET, ORALLY DISINTEGRATING ORAL at 17:02

## 2019-02-20 RX ADMIN — VENLAFAXINE HYDROCHLORIDE 37.5 MG: 37.5 CAPSULE, EXTENDED RELEASE ORAL at 08:33

## 2019-02-20 RX ADMIN — RISPERIDONE 1.5 MG: 0.5 TABLET, ORALLY DISINTEGRATING ORAL at 07:30

## 2019-02-20 RX ADMIN — CARBAMAZEPINE 200 MG: 200 TABLET ORAL at 12:11

## 2019-02-20 RX ADMIN — PANTOPRAZOLE SODIUM 20 MG: 20 TABLET, DELAYED RELEASE ORAL at 17:02

## 2019-02-20 RX ADMIN — QUETIAPINE FUMARATE 100 MG: 100 TABLET ORAL at 20:35

## 2019-02-20 RX ADMIN — CARBAMAZEPINE 200 MG: 200 TABLET ORAL at 08:32

## 2019-02-20 RX ADMIN — PANTOPRAZOLE SODIUM 20 MG: 20 TABLET, DELAYED RELEASE ORAL at 08:33

## 2019-02-20 RX ADMIN — CARBAMAZEPINE 200 MG: 200 TABLET ORAL at 17:02

## 2019-02-20 NOTE — PLAN OF CARE
Problem: Patient Care Overview (Adult)  Goal: Plan of Care Review  Outcome: Ongoing (interventions implemented as appropriate)   02/20/19 0931   Coping/Psychosocial   Patient Agreement with Plan of Care agrees   Plan of Care Review   Progress no change   Outcome Summary  discussed d/c planning with patient and her mother. Patient is requesting transfer to [facility] psychiatry unit.  left mesage with  to discuss transfer process.,   Coping/Psychosocial   Plan Of Care Reviewed With patient;family

## 2019-02-20 NOTE — PLAN OF CARE
"Problem: Patient Care Overview (Adult)  Goal: Plan of Care Review  Outcome: Ongoing (interventions implemented as appropriate)   02/19/19 2038   Coping/Psychosocial   Patient Agreement with Plan of Care agrees   Plan of Care Review   Progress improving   Outcome Summary Erika was visible on the unit and social with her peers. She denies SI and contracts for safety on the unit. Pt appeared less distressed when answering that question than she has in the past. She states that she is anxious and her mood is \"blah\". She said \"I want to request a transfer, I'm not getting any better\". She had a good visit with her parents tonight, and said that her dad made her laugh.She went to wrap up group. Staff will continue to monitor and provide support.    Coping/Psychosocial   Plan Of Care Reviewed With patient         "

## 2019-02-20 NOTE — PROGRESS NOTES
Psychiatry Progress Note    Chief Complaint/Reason for follow-up: Mood disorder, autism spectrum disorder, OCD and borderline personality, dependant traits vs pdo    Interval History: Patient initially stated that she wanted to be transferred to another facility, possibly [facility].   I spoke with the patient's parents at the phone number cited below.  They were appreciative of our discussion and I reiterated to them that we saw an improvement last time she was here, and that this recovery will take some time.  The parents ultimately were agreeable to encourage Nadine to agree to stay for at least the next few days, And let us try to help her.  She admitted to having less intrusive thoughts and was less perseverative about death today.    Review of Systems:   Sleep:  Good, Appetite: Good and GI: No complaints    Vital Signs for the last 24 hours:  Temp:  [36.8 °C (98.2 °F)] 36.8 °C (98.2 °F)  Heart Rate:  [74-86] 81  Resp:  [18] 18  BP: (117-134)/(59-70) 117/59    Medications  Scheduled    carBAMazepine 200 mg oral TID with meals   pantoprazole 20 mg oral BID   QUEtiapine 100 mg oral Nightly   risperiDONE 1.5 mg oral Daily before breakfast   risperiDONE 1.5 mg oral Daily   venlafaxine XR 37.5 mg oral Daily with breakfast     PRN  hydrOXYzine  •  risperiDONE      Mental Status Exam:  Appearance: appropriate attire  Gait and Motor: slow  Speech: soft  Mood: depressed and anxious  Affect: dysphoric and anxious  Associations: coherent  Thought Process: perseveration and obsessive  Thought Content: obsessions and misperceptions  Suicidality/Homicidality: thoughts of being dead/ no desire or plan to die  Judgement/Insight: makes choices that perpetuate illness  Orientation: month, year, season, type of place, name of place, city and situation  Memory: recalls recent events and recalls remote events  Attention: distracted      Assessment/Plan  Unspecified mood (affective) disorder (CMS/HCC)   Assessment & Plan     38-year-old  female with history of mood disorder, autism spectrum disorder, OCD and borderline personality disorder admitted after worsening perseverative thoughts about death and dying.    Increase Risperdal to 1.5 mg am, 1.5 mg 5pm for mood stability  Increase seroquel to 100mg HS for mood stability and insomnia  Additional risperdal 0.5mg tid prn anxiety / agitation  Tegretol 200mg TID for mood stability  Effexor XR 37.5mg for depression, anxiety    Discontinue benzodiazepines for poor response   Promote appropriate mindfulness techniques in response to obsessive anxiety provoking thoughts and behaviors.  AVOID WHENEVER POSSIBLE ASKING PATIENT IF SHE IS HAVING INTRUSIVE THOUGHTS.  Encourage patient to come to staff regularly to just check in.        Patient seen and evaluated for approximately 25 minutes.  I also spoke with the patient's parents melanie and Puja Vaughan at phone number [phone].    Ga Horne MD  2/20/2019

## 2019-02-20 NOTE — PLAN OF CARE
"Problem: Patient Care Overview (Adult)  Goal: Plan of Care Review  Outcome: Ongoing (interventions implemented as appropriate)   02/20/19 1122   Coping/Psychosocial   Patient Agreement with Plan of Care agrees   Plan of Care Review   Progress no change   Outcome Summary Erika continues to state that she does not feel that she is improving. Talking about wanting to be transferred to another facility, but can not make up her mind. denies SI. Less perservative about \"death\", less intrusive thoughts around death. Visible on unit but not overly social with her peers. Attended group. Will continue to provide support   Coping/Psychosocial   Plan Of Care Reviewed With patient       Problem: Cognitive Impairment (Psychotic Signs/Symptoms) (Adult)  Goal: Improved Thought Clarity/Organization  Outcome: Ongoing (interventions implemented as appropriate)        "

## 2019-02-21 PROCEDURE — 99232 SBSQ HOSP IP/OBS MODERATE 35: CPT | Performed by: PSYCHIATRY & NEUROLOGY

## 2019-02-21 PROCEDURE — 12400000 HC ROOM AND CARE SEMIPRIVATE PSYCH

## 2019-02-21 PROCEDURE — 63700000 HC SELF-ADMINISTRABLE DRUG: Performed by: PSYCHIATRY & NEUROLOGY

## 2019-02-21 RX ADMIN — CARBAMAZEPINE 200 MG: 200 TABLET ORAL at 18:28

## 2019-02-21 RX ADMIN — CARBAMAZEPINE 200 MG: 200 TABLET ORAL at 12:26

## 2019-02-21 RX ADMIN — PANTOPRAZOLE SODIUM 20 MG: 20 TABLET, DELAYED RELEASE ORAL at 08:36

## 2019-02-21 RX ADMIN — RISPERIDONE 1.5 MG: 0.5 TABLET, ORALLY DISINTEGRATING ORAL at 07:30

## 2019-02-21 RX ADMIN — QUETIAPINE FUMARATE 100 MG: 100 TABLET ORAL at 20:34

## 2019-02-21 RX ADMIN — CARBAMAZEPINE 200 MG: 200 TABLET ORAL at 08:36

## 2019-02-21 RX ADMIN — PANTOPRAZOLE SODIUM 20 MG: 20 TABLET, DELAYED RELEASE ORAL at 18:28

## 2019-02-21 RX ADMIN — RISPERIDONE 1.5 MG: 0.5 TABLET, ORALLY DISINTEGRATING ORAL at 18:28

## 2019-02-21 RX ADMIN — VENLAFAXINE HYDROCHLORIDE 37.5 MG: 37.5 CAPSULE, EXTENDED RELEASE ORAL at 08:36

## 2019-02-21 NOTE — PROGRESS NOTES
"Psychiatry Progress Note    Chief Complaint/Reason for follow-up: Mood disorder, autism spectrum disorder, OCD and borderline personality, dependant traits vs pdo    Interval History: Patient appears to be less perseverative on negative thoughts, but then says \"why do I not seem to be getting better?\"  We talked about obsessive thinking and I encouraged the patient to \"change the channel\" when she is having a negative thought.  We practiced a meditative exercise, during which the patient slowly took of breath in her nose and out of her mouth while thinking the number \"ninety-nine.\"  The patient acknowledges sleeping well but had an interruption towards the morning and found it difficult to get back to sleep.  She admits to being tired and feels that her negative thoughts are ego-dystonic and not coming from her.    Review of Systems:   Sleep:  Fair, Appetite: Good and GI: No complaints    Vital Signs for the last 24 hours:  Temp:  [36.8 °C (98.2 °F)-36.8 °C (98.3 °F)] 36.8 °C (98.3 °F)  Heart Rate:  [66-88] 75  Resp:  [16] 16  BP: (119-127)/(59-68) 119/64    Medications  Scheduled    carBAMazepine 200 mg oral TID with meals   pantoprazole 20 mg oral BID   QUEtiapine 100 mg oral Nightly   risperiDONE 1.5 mg oral Daily before breakfast   risperiDONE 1.5 mg oral Daily   venlafaxine XR 37.5 mg oral Daily with breakfast     PRN  hydrOXYzine  •  risperiDONE      Mental Status Exam:  Appearance: appropriate attire  Gait and Motor: slow  Speech: soft  Mood: depressed and anxious  Affect: dysphoric and anxious  Associations: coherent  Thought Process: perseveration and obsessive  Thought Content: obsessions and misperceptions  Suicidality/Homicidality: denies  Judgement/Insight: makes choices that perpetuate illness  Orientation: month, year, season, type of place, name of place, city and situation  Memory: recalls recent events and recalls remote events  Attention: distracted       Assessment/Plan  Unspecified mood " (affective) disorder (CMS/[facility])   Assessment & Plan    38-year-old  female with history of mood disorder, autism spectrum disorder, OCD and borderline personality disorder admitted after worsening perseverative thoughts about death and dying.    Increase Risperdal to 1.5 mg am, 1.5 mg 5pm for mood stability  Increase seroquel to 100mg HS for mood stability and insomnia  Additional risperdal 0.5mg tid prn anxiety / agitation  Tegretol 200mg TID for mood stability  Effexor XR 37.5mg for depression, anxiety    Discontinue benzodiazepines for poor response   Promote appropriate mindfulness techniques in response to obsessive anxiety provoking thoughts and behaviors.  AVOID WHENEVER POSSIBLE ASKING PATIENT IF SHE IS HAVING INTRUSIVE THOUGHTS.  Encourage patient to come to staff regularly to just check in.        Patient seen and evaluated for approximately 25 minutes in therapy.    Ga Horne MD  2/21/2019

## 2019-02-21 NOTE — PLAN OF CARE
"Problem: Patient Care Overview (Adult)  Goal: Plan of Care Review  Outcome: Ongoing (interventions implemented as appropriate)   02/20/19 2044   Coping/Psychosocial   Patient Agreement with Plan of Care agrees   Plan of Care Review   Progress improving   Outcome Summary Erika was visible on the unit and social with peers. She is still anxious and said \"I'm scared about going home\". She is still struggling with intrusive thoughts, but did state \"I don't want to hurt myself\". When she started to perseverate on the intrusive thoughts, she went and did word searches to help distract her. She did go to wrap up group. Staff will continue to monitor and provide support.    Coping/Psychosocial   Plan Of Care Reviewed With patient         "

## 2019-02-21 NOTE — PLAN OF CARE
"Problem: Patient Care Overview (Adult)  Goal: Plan of Care Review  Outcome: Ongoing (interventions implemented as appropriate)   02/21/19 1249   Coping/Psychosocial   Patient Agreement with Plan of Care agrees   Plan of Care Review   Progress improving   Outcome Summary Erika was visible on the unit. stated this am she was thinking a thought, \"power to the moon\". Asked what that meant she stated, \"it could be good or bad\", but couldn't say what it meant today. Still stating, \"no one is helping me, I've been here two weeks and I don't feel any better\". Continues to say she's not sure if shes lying about her thoughts. Tearful and easliy agitated around 1130, \"I need someone to talk to and no one will talk to me\". Numerous staff members have tried to talk with Erika, but she gets frustrated and walks away. did have a discussion with her regarding the things she wuld like to do, what her typical day at home looks like. I encouraged Erika to add some other activities into her day. Maybe spend some additoinal time with her friends, which she says she enjoys. Encouraged her to write a list of the things she would find enjoyable. Trying to have her take a bit more responsiblilty for some of her own happiness. She still continues to state, \"why do I exist\". She denies wanting to harm herself. Just asked her to tell me how she feels instead of questioning her. this seemed to help a bit, she did not perservate as much on her thoughts. Will continue to provide support   Coping/Psychosocial   Plan Of Care Reviewed With patient       Problem: Cognitive Impairment (Psychotic Signs/Symptoms) (Adult)  Goal: Improved Thought Clarity/Organization  Outcome: Ongoing (interventions implemented as appropriate)        "

## 2019-02-22 PROCEDURE — 99232 SBSQ HOSP IP/OBS MODERATE 35: CPT | Performed by: PSYCHIATRY & NEUROLOGY

## 2019-02-22 PROCEDURE — 63700000 HC SELF-ADMINISTRABLE DRUG: Performed by: PSYCHIATRY & NEUROLOGY

## 2019-02-22 PROCEDURE — 12400000 HC ROOM AND CARE SEMIPRIVATE PSYCH

## 2019-02-22 RX ORDER — VENLAFAXINE HYDROCHLORIDE 37.5 MG/1
37.5 CAPSULE, EXTENDED RELEASE ORAL
Status: COMPLETED | OUTPATIENT
Start: 2019-02-22 | End: 2019-02-22

## 2019-02-22 RX ORDER — VENLAFAXINE HYDROCHLORIDE 75 MG/1
75 CAPSULE, EXTENDED RELEASE ORAL
Status: DISCONTINUED | OUTPATIENT
Start: 2019-02-23 | End: 2019-02-25

## 2019-02-22 RX ADMIN — RISPERIDONE 1.5 MG: 0.5 TABLET, ORALLY DISINTEGRATING ORAL at 07:28

## 2019-02-22 RX ADMIN — PANTOPRAZOLE SODIUM 20 MG: 20 TABLET, DELAYED RELEASE ORAL at 08:52

## 2019-02-22 RX ADMIN — QUETIAPINE FUMARATE 100 MG: 100 TABLET ORAL at 20:23

## 2019-02-22 RX ADMIN — PANTOPRAZOLE SODIUM 20 MG: 20 TABLET, DELAYED RELEASE ORAL at 17:17

## 2019-02-22 RX ADMIN — RISPERIDONE 1.5 MG: 0.5 TABLET, ORALLY DISINTEGRATING ORAL at 17:17

## 2019-02-22 RX ADMIN — VENLAFAXINE HYDROCHLORIDE 37.5 MG: 37.5 CAPSULE, EXTENDED RELEASE ORAL at 12:20

## 2019-02-22 RX ADMIN — CARBAMAZEPINE 200 MG: 200 TABLET ORAL at 17:17

## 2019-02-22 RX ADMIN — CARBAMAZEPINE 200 MG: 200 TABLET ORAL at 12:20

## 2019-02-22 RX ADMIN — VENLAFAXINE HYDROCHLORIDE 37.5 MG: 37.5 CAPSULE, EXTENDED RELEASE ORAL at 08:52

## 2019-02-22 RX ADMIN — CARBAMAZEPINE 200 MG: 200 TABLET ORAL at 08:52

## 2019-02-22 NOTE — PROGRESS NOTES
"Psychiatry Progress Note    Chief Complaint/Reason for follow-up: Mood disorder, autism spectrum disorder, OCD and borderline personality, dependant traits vs pdo    Interval History: Patient is somewhat less perseverative on negative thoughts, but still gravitates towards them until she is actively steered away from obsessive thoughts of death.  She denies any intent to act on these thoughts and they appear to be strongly ego dystonic in nature.  She is more easily pulled away from these thoughts today than previously.  She describes feeling \"nothing\" while working on art projects that previously gave her pleasure.  For this reason, we will cautiously increase Effexor to 75 mg by adding an additional 37.5 mg this morning and watch for lability.    Review of Systems:   Sleep:  Good, Appetite: Good and GI: No complaints    Vital Signs for the last 24 hours:  Temp:  [37.3 °C (99.1 °F)-37.4 °C (99.3 °F)] 37.3 °C (99.1 °F)  Heart Rate:  [70-89] 89  Resp:  [16-18] 18  BP: (115-125)/(58-67) 119/61    Medications  Scheduled    carBAMazepine 200 mg oral TID with meals   pantoprazole 20 mg oral BID   QUEtiapine 100 mg oral Nightly   risperiDONE 1.5 mg oral Daily before breakfast   risperiDONE 1.5 mg oral Daily   venlafaxine XR 37.5 mg oral Daily with breakfast   [START ON 2/23/2019] venlafaxine XR 75 mg oral Daily with breakfast     PRN  hydrOXYzine  •  risperiDONE      Mental Status Exam:  Appearance: appropriate attire  Gait and Motor: slow  Speech: soft  Mood: depressed and anxious  Affect: dysphoric and anxious  Associations: coherent  Thought Process: perseveration and obsessive  Thought Content: obsessions and misperceptions  Suicidality/Homicidality: denies  Judgement/Insight: makes choices that perpetuate illness  Orientation: month, year, season, type of place, name of place, city and situation  Memory: recalls recent events and recalls remote events  Attention: distracted    Assessment/Plan  Unspecified mood " (affective) disorder (CMS/[facility])   Assessment & Plan    38-year-old  female with history of mood disorder, autism spectrum disorder, OCD and borderline personality disorder admitted after worsening perseverative thoughts about death and dying.    Increase Risperdal to 1.5 mg am, 1.5 mg 5pm for mood stability  Increase seroquel to 100mg HS for mood stability and insomnia  Additional risperdal 0.5mg tid prn anxiety / agitation  Tegretol 200mg TID for mood stability  Increase Effexor XR to 75 mg for depression, anxiety    Discontinue benzodiazepines for poor response   Promote appropriate mindfulness techniques in response to obsessive anxiety provoking thoughts and behaviors.  AVOID WHENEVER POSSIBLE ASKING PATIENT IF SHE IS HAVING INTRUSIVE THOUGHTS.  Encourage patient to come to staff regularly to just check in.        Patient seen and evaluated for approximately 25 minutes in therapy.    Ga Horne MD  2/22/2019

## 2019-02-22 NOTE — PLAN OF CARE
"Problem: Patient Care Overview (Adult)  Goal: Plan of Care Review  Outcome: Ongoing (interventions implemented as appropriate)   02/21/19 2048   Coping/Psychosocial   Patient Agreement with Plan of Care agrees   Plan of Care Review   Progress improving   Outcome Summary Erika is visible on the unit and minimally social with peers. She stated \"I don't want to hurt myself or others\". She is still obsessing over her intrusive thoughts and was tearful a few times this shift when focusing on them. She is frustrated and said \"why am I thinking like this?\". She is frustrated that she is still struggling with these thoughts and cannot identify a reason why. Pt reviewed skills she can use to help combat her intrusive thoughts, which included deep breathing and crossword puzzles. She went to wrap up group. Staff will continue to monitor and provide support.    Coping/Psychosocial   Plan Of Care Reviewed With patient         "

## 2019-02-22 NOTE — PLAN OF CARE
Problem: Overarching Goals (Adult)  Goal: Adheres to Safety Considerations for Self and Others    Intervention: Develop/Maintain Individualized Safety Plan   02/22/19 0934   Develop/Maintain Individualized Safety Plan   Safety Measures self-directed behavior promoted       Goal: Optimized Coping Skills in Response to Life Stressors    Intervention: Promote Effective Coping Strategies   02/22/19 0934   Coping/Psychosocial Interventions   Supportive Measures relaxation techniques promoted;self-care encouraged       Goal: Develops/Participates in Therapeutic [place]dwell to Support Successful Transition    Intervention: Foster Therapeutic [place]   02/22/19 0934   Psychosocial Support   Trust Relationship/Rapport emotional support provided;thoughts/feelings acknowledged;reassurance provided;questions answered;choices provided;care explained;questions encouraged

## 2019-02-22 NOTE — PLAN OF CARE
"Problem: Patient Care Overview (Adult)  Goal: Plan of Care Review  Outcome: Ongoing (interventions implemented as appropriate)   02/22/19 1251   Coping/Psychosocial   Patient Agreement with Plan of Care agrees   Plan of Care Review   Progress no change   Outcome Summary Erika is visible on unit, attendeing groups but states she has difficulty completing the tasks during group, says she doesn't understand what she needs to do. Keeps to self, minimally to no socialization with peers when not in group. Stillstating she is having intrusive thoughts. Very nehative thinking, seems to be very hard on herself when unable to complete tasks. Conitnues to be help rejecting, easliy frustrated. \"I've been here 2 weeks and I'm not better\". Needs much encouragement to try and think positively. Effexor increased this shift recieved fulldose of 75mg. Denies SI. Willcontinue to provide emotional support and encouragement   Coping/Psychosocial   Plan Of Care Reviewed With patient       Problem: Cognitive Impairment (Psychotic Signs/Symptoms) (Adult)  Goal: Improved Thought Clarity/Organization  Outcome: Ongoing (interventions implemented as appropriate)        "

## 2019-02-23 PROCEDURE — 12400000 HC ROOM AND CARE SEMIPRIVATE PSYCH

## 2019-02-23 PROCEDURE — 99231 SBSQ HOSP IP/OBS SF/LOW 25: CPT | Performed by: PSYCHIATRY & NEUROLOGY

## 2019-02-23 PROCEDURE — 63700000 HC SELF-ADMINISTRABLE DRUG: Performed by: PSYCHIATRY & NEUROLOGY

## 2019-02-23 RX ADMIN — RISPERIDONE 1.5 MG: 0.5 TABLET, ORALLY DISINTEGRATING ORAL at 17:15

## 2019-02-23 RX ADMIN — QUETIAPINE FUMARATE 100 MG: 100 TABLET ORAL at 20:31

## 2019-02-23 RX ADMIN — CARBAMAZEPINE 200 MG: 200 TABLET ORAL at 08:36

## 2019-02-23 RX ADMIN — CARBAMAZEPINE 200 MG: 200 TABLET ORAL at 12:24

## 2019-02-23 RX ADMIN — VENLAFAXINE HYDROCHLORIDE 75 MG: 75 CAPSULE, EXTENDED RELEASE ORAL at 08:36

## 2019-02-23 RX ADMIN — CARBAMAZEPINE 200 MG: 200 TABLET ORAL at 17:15

## 2019-02-23 RX ADMIN — RISPERIDONE 1.5 MG: 0.5 TABLET, ORALLY DISINTEGRATING ORAL at 08:35

## 2019-02-23 RX ADMIN — PANTOPRAZOLE SODIUM 20 MG: 20 TABLET, DELAYED RELEASE ORAL at 08:36

## 2019-02-23 RX ADMIN — PANTOPRAZOLE SODIUM 20 MG: 20 TABLET, DELAYED RELEASE ORAL at 17:15

## 2019-02-23 NOTE — PLAN OF CARE
"Problem: Patient Care Overview (Adult)  Goal: Plan of Care Review  Outcome: Ongoing (interventions implemented as appropriate)   02/23/19 1244   Coping/Psychosocial   Patient Agreement with Plan of Care agrees with comment (describe)   Plan of Care Review   Progress progress toward functional goals is gradual   Outcome Summary Encouraged Erika to check in with me every 2 hours re: how she feels. Even though I didn't ask about her \"intrusive thoughts\", and all staff have made an effort not to ask her,  Patient spontaneously reminded me that she has bad thoughts when staff ask her about them. Needed to be frequently redirected in am about not persisting on negative thoughts.  Able to be redirected and distracted to think about positive things (like her pets) with help. Used meditation effectively and practiced deep breathing exercises with RN.  Given handout on relaxation exercises. Better as day progressed and was able to participate fully  In am relaxation group.     Coping/Psychosocial   Plan Of Care Reviewed With patient       Problem: Cognitive Impairment (Psychotic Signs/Symptoms) (Adult)  Goal: Improved Thought Clarity/Organization  Outcome: Ongoing (interventions implemented as appropriate)        "

## 2019-02-23 NOTE — PLAN OF CARE
Problem: Patient Care Overview (Adult)  Goal: Plan of Care Review  Outcome: Ongoing (interventions implemented as appropriate)   02/22/19 5465   Coping/Psychosocial   Patient Agreement with Plan of Care agrees   Plan of Care Review   Progress progress toward functional goals is gradual   Outcome Summary Erika was visible and appropriate on the unit. She did not interact with peers. She was not demanding or tearful this shift. She appears flat and restricted. Denies SI. Did not complain of intrusive thoughts. She shared the info from Dr Horne with both of her parents. States she was tired and wanted to go to bed early. Med compliant.    Coping/Psychosocial   Plan Of Care Reviewed With patient

## 2019-02-23 NOTE — PROGRESS NOTES
"PSYCHIATRIC PROGRESS NOTE    Chief Complaint/Reason for follow-up:   Mood disorder, autism spectrum disorder, OCD and borderline personality, dependant traits vs pdo    Interval History:   Says she's not doing well because she's still having worries that she doesn't know if she's going to become a bad person, feeling uneasy, says her stomach's in knots. Denies actual SI or HI, has no h/o harming herself or others. Says \"I'm a good person,\" but is afraid she's going to do something, says she doesn't know why she keeps telling people she's going to hurt herself or others, as she doesn't actually have those thoughts. Has some moments when she feels better and doesn't perseverate on such thoughts, like when she's laughing to herself or with her friends. Denies AVH. Worried that she doesn't feel like her meds are helping, no side effects.    Review of Systems:   Sleep:  okay, has been sleeping more in the past month  Appetite: Good  GI: No complaints    Vital Signs for the last 24 hours:  Temp:  [36.8 °C (98.2 °F)-37.1 °C (98.7 °F)] 37.1 °C (98.7 °F)  Heart Rate:  [78-87] 83  Resp:  [18] 18  BP: (128-141)/(64-77) 135/77    Active Meds:    Current Facility-Administered Medications:   •  carBAMazepine (TEGretol) tablet 200 mg, 200 mg, oral, TID with meals, Ga Horne MD, 200 mg at 02/23/19 0836  •  hydrOXYzine (ATARAX) tablet 50 mg, 50 mg, oral, q6h PRN, Ga Horne MD, 50 mg at 02/19/19 0825  •  pantoprazole (PROTONIX) tablet,delayed release (DR/EC) 20 mg, 20 mg, oral, BID, Ga Horne MD, 20 mg at 02/23/19 0836  •  QUEtiapine (SEROquel) tablet 100 mg, 100 mg, oral, Nightly, Ga Horne MD, 100 mg at 02/22/19 2023  •  risperiDONE (RisperDAL M-TABS) disintegrating tablet 0.5 mg, 0.5 mg, oral, 3x daily PRN, Ga Horne MD, 0.5 mg at 02/19/19 0825  •  risperiDONE (RisperDAL M-TABS) disintegrating tablet 1.5 mg, 1.5 mg, oral, Daily before breakfast, Ga Horne MD, 1.5 mg at 02/23/19 " 0835  •  risperiDONE (RisperDAL M-TABS) disintegrating tablet 1.5 mg, 1.5 mg, oral, Daily, Ga Horne MD, 1.5 mg at 02/22/19 1717  •  venlafaxine XR (EFFEXOR-XR) 24 hr ER capsule 75 mg, 75 mg, oral, Daily with breakfast, Ga Horne MD, 75 mg at 02/23/19 0836    Labs:        MENTAL STATUS EXAM  Appearance: appropriate attire  Gait and Motor: slow  Speech: soft, emotional tone  Mood: depressed and anxious  Affect: dysphoric and anxious, tearful at beginning  Associations: coherent  Thought Process: perseveration and obsessive  Thought Content: obsessions and misperceptions  Suicidality/Homicidality: denies  Judgement/Insight: makes choices that perpetuate illness  Orientation: month, year, season, type of place, name of place, city and situation  Memory: recalls recent events and recalls remote events  Attention: distracted    Assessment/Plan  38-year-old  female with history of mood disorder, autism spectrum disorder, OCD and borderline personality disorder admitted after worsening perseverative thoughts about death and dying.     Continue Risperdal 1.5 mg am, 1.5 mg 5pm for mood stability  Continue seroquel 100mg HS for mood stability and insomnia  Continue Risperdal 0.5mg tid prn anxiety / agitation  Continue Tegretol 200mg TID for mood stability  Continue Effexor XR 75 mg for depression, anxiety     Discontinue benzodiazepines for poor response   Promote appropriate mindfulness techniques in response to obsessive anxiety provoking thoughts and behaviors.  AVOID WHENEVER POSSIBLE ASKING PATIENT IF SHE IS HAVING INTRUSIVE THOUGHTS.  Encourage patient to come to staff regularly to just check in.

## 2019-02-24 PROCEDURE — 99231 SBSQ HOSP IP/OBS SF/LOW 25: CPT | Performed by: PSYCHIATRY & NEUROLOGY

## 2019-02-24 PROCEDURE — 63700000 HC SELF-ADMINISTRABLE DRUG: Performed by: PSYCHIATRY & NEUROLOGY

## 2019-02-24 PROCEDURE — 12400000 HC ROOM AND CARE SEMIPRIVATE PSYCH

## 2019-02-24 RX ADMIN — VENLAFAXINE HYDROCHLORIDE 75 MG: 75 CAPSULE, EXTENDED RELEASE ORAL at 08:40

## 2019-02-24 RX ADMIN — RISPERIDONE 1.5 MG: 0.5 TABLET, ORALLY DISINTEGRATING ORAL at 07:28

## 2019-02-24 RX ADMIN — PANTOPRAZOLE SODIUM 20 MG: 20 TABLET, DELAYED RELEASE ORAL at 17:03

## 2019-02-24 RX ADMIN — PANTOPRAZOLE SODIUM 20 MG: 20 TABLET, DELAYED RELEASE ORAL at 08:40

## 2019-02-24 RX ADMIN — RISPERIDONE 0.5 MG: 0.5 TABLET, ORALLY DISINTEGRATING ORAL at 18:44

## 2019-02-24 RX ADMIN — CARBAMAZEPINE 200 MG: 200 TABLET ORAL at 17:03

## 2019-02-24 RX ADMIN — HYDROXYZINE HYDROCHLORIDE 50 MG: 25 TABLET ORAL at 15:10

## 2019-02-24 RX ADMIN — QUETIAPINE FUMARATE 100 MG: 100 TABLET ORAL at 20:49

## 2019-02-24 RX ADMIN — RISPERIDONE 1.5 MG: 0.5 TABLET, ORALLY DISINTEGRATING ORAL at 17:03

## 2019-02-24 RX ADMIN — CARBAMAZEPINE 200 MG: 200 TABLET ORAL at 08:40

## 2019-02-24 RX ADMIN — CARBAMAZEPINE 200 MG: 200 TABLET ORAL at 12:09

## 2019-02-24 RX ADMIN — RISPERIDONE 0.5 MG: 0.5 TABLET, ORALLY DISINTEGRATING ORAL at 10:47

## 2019-02-24 RX ADMIN — HYDROXYZINE HYDROCHLORIDE 50 MG: 25 TABLET ORAL at 10:44

## 2019-02-24 NOTE — PLAN OF CARE
"Problem: Patient Care Overview (Adult)  Goal: Plan of Care Review  Outcome: Ongoing (interventions implemented as appropriate)   02/23/19 2032   Coping/Psychosocial   Patient Agreement with Plan of Care agrees   Plan of Care Review   Progress improving   Outcome Summary Erika is visible on the unit and minimally social with peers. While staff is not asking her about her intrusive thoughts, she does bring up the topic herself. She said that she does not want to hurt others or herself. She is said \"I'm scared about what I may do when I get home\". Pt was encouraged to use deep breathing and diversional activities when having intrusive thoughts. During wrap up group Erika said that she tried to be more positive, and instead of saying she had a bad day she said \"I'm hanging in there\". Staff will continue to monitor and provide support.    Coping/Psychosocial   Plan Of Care Reviewed With patient         "

## 2019-02-24 NOTE — PROGRESS NOTES
"PSYCHIATRIC PROGRESS NOTE    Chief Complaint/Reason for follow-up:   Mood disorder, autism spectrum disorder, OCD and borderline personality, dependant traits vs pdo    Interval History:   Per nursing pt still with perseverative thoughts but appearing to improve over time. Says the rest of the day yesterday \"was okay.\" Pt emotional, tearful, saying \"what is wrong with me,\" \"I'm not a mass murderer,\" denies actual thoughts of SI/HI, denies AVH, denies urges to harm self or others \"I don't know why I say these things\" \"I haven't hurt anyone\" \"I'm not a bad person, I'm not going to hurt anyone\"    Review of Systems:   Sleep: okay, has been sleeping more in the past month  Appetite: fair  GI: No complaints    Vital Signs for the last 24 hours:  Temp:  [37 °C (98.6 °F)-37.2 °C (99 °F)] 37 °C (98.6 °F)  Heart Rate:  [86-90] 90  Resp:  [16] 16  BP: (135-147)/(67) 135/67    Active Meds:    Current Facility-Administered Medications:   •  carBAMazepine (TEGretol) tablet 200 mg, 200 mg, oral, TID with meals, Ga Horne MD, 200 mg at 02/24/19 0840  •  hydrOXYzine (ATARAX) tablet 50 mg, 50 mg, oral, q6h PRN, Ga Horne MD, 50 mg at 02/19/19 0825  •  pantoprazole (PROTONIX) tablet,delayed release (DR/EC) 20 mg, 20 mg, oral, BID, aG Horne MD, 20 mg at 02/24/19 0840  •  QUEtiapine (SEROquel) tablet 100 mg, 100 mg, oral, Nightly, Ga Horne MD, 100 mg at 02/23/19 2031  •  risperiDONE (RisperDAL M-TABS) disintegrating tablet 0.5 mg, 0.5 mg, oral, 3x daily PRN, Ga Horne MD, 0.5 mg at 02/19/19 0825  •  risperiDONE (RisperDAL M-TABS) disintegrating tablet 1.5 mg, 1.5 mg, oral, Daily before breakfast, Ga Horne MD, 1.5 mg at 02/24/19 0728  •  risperiDONE (RisperDAL M-TABS) disintegrating tablet 1.5 mg, 1.5 mg, oral, Daily, Ga Horne MD, 1.5 mg at 02/23/19 1715  •  venlafaxine XR (EFFEXOR-XR) 24 hr ER capsule 75 mg, 75 mg, oral, Daily with breakfast, Ga Horne MD, 75 mg at " 02/24/19 0840    Labs:        MENTAL STATUS EXAM  Appearance: appropriate attire  Gait and Motor: slow  Speech: soft, emotional tone  Mood: depressed and anxious  Affect: dysphoric and anxious, tearful  Associations: coherent  Thought Process: perseveration and obsessive  Thought Content: obsessions and misperceptions  Suicidality/Homicidality: denies  Judgement/Insight: makes choices that perpetuate illness  Orientation: month, year, season, type of place, name of place, city and situation  Memory: recalls recent events and recalls remote events  Attention: distracted    Assessment/Plan  38-year-old  female with history of mood disorder, autism spectrum disorder, OCD and borderline personality disorder admitted after worsening perseverative thoughts about death and dying.     Continue Risperdal 1.5 mg am, 1.5 mg 5pm for mood stability  Continue seroquel 100mg HS for mood stability and insomnia  Continue Risperdal 0.5mg tid prn anxiety / agitation  Continue Tegretol 200mg TID for mood stability  Continue Effexor XR 75 mg for depression, anxiety     Discontinue benzodiazepines for poor response   Promote appropriate mindfulness techniques in response to obsessive anxiety provoking thoughts and behaviors.  AVOID WHENEVER POSSIBLE ASKING PATIENT IF SHE IS HAVING INTRUSIVE THOUGHTS.  Encourage patient to come to staff regularly to just check in.

## 2019-02-24 NOTE — PLAN OF CARE
"Problem: Patient Care Overview (Adult)  Goal: Plan of Care Review  Outcome: Ongoing (interventions implemented as appropriate)   02/24/19 1401   Coping/Psychosocial   Patient Agreement with Plan of Care agrees with comment (describe)   Plan of Care Review   Progress progress towards functional goals is fair   Outcome Summary Erika continues to have intermittent episodes of tearfulness and anxiety, but is able to regroup a bit more easily with redirection and support.  Compliant with meds. Did take prn of risperidone and atarax after speaking with phsyician. Patient expressed fear and increased anxiety because \"she asked me about my intrusive thoughts.  Able to quickly be distracted even before prn medication could be effective, by redirecting to art therapy group, where patient was doing an activity.  Given written instructions for several deep breathing and relaxation exercises. Patient reacted positively to them.  Patient denies that she would want to \"hurt anyone or herself\". Gave me this information spontaneously, without my prompting.    Coping/Psychosocial   Plan Of Care Reviewed With patient       Problem: Cognitive Impairment (Psychotic Signs/Symptoms) (Adult)  Goal: Improved Thought Clarity/Organization  Outcome: Ongoing (interventions implemented as appropriate)        "

## 2019-02-25 PROCEDURE — 63700000 HC SELF-ADMINISTRABLE DRUG: Performed by: PSYCHIATRY & NEUROLOGY

## 2019-02-25 PROCEDURE — 99232 SBSQ HOSP IP/OBS MODERATE 35: CPT | Performed by: PSYCHIATRY & NEUROLOGY

## 2019-02-25 PROCEDURE — 12400000 HC ROOM AND CARE SEMIPRIVATE PSYCH

## 2019-02-25 RX ORDER — VENLAFAXINE HYDROCHLORIDE 37.5 MG/1
37.5 CAPSULE, EXTENDED RELEASE ORAL
Status: DISCONTINUED | OUTPATIENT
Start: 2019-02-26 | End: 2019-02-27 | Stop reason: HOSPADM

## 2019-02-25 RX ADMIN — CARBAMAZEPINE 200 MG: 200 TABLET ORAL at 17:22

## 2019-02-25 RX ADMIN — PANTOPRAZOLE SODIUM 20 MG: 20 TABLET, DELAYED RELEASE ORAL at 08:45

## 2019-02-25 RX ADMIN — RISPERIDONE 1.5 MG: 0.5 TABLET, ORALLY DISINTEGRATING ORAL at 17:22

## 2019-02-25 RX ADMIN — QUETIAPINE FUMARATE 100 MG: 100 TABLET ORAL at 20:31

## 2019-02-25 RX ADMIN — CARBAMAZEPINE 200 MG: 200 TABLET ORAL at 12:22

## 2019-02-25 RX ADMIN — CARBAMAZEPINE 200 MG: 200 TABLET ORAL at 08:45

## 2019-02-25 RX ADMIN — PANTOPRAZOLE SODIUM 20 MG: 20 TABLET, DELAYED RELEASE ORAL at 17:22

## 2019-02-25 RX ADMIN — RISPERIDONE 1.5 MG: 0.5 TABLET, ORALLY DISINTEGRATING ORAL at 07:30

## 2019-02-25 RX ADMIN — VENLAFAXINE HYDROCHLORIDE 75 MG: 75 CAPSULE, EXTENDED RELEASE ORAL at 08:45

## 2019-02-25 NOTE — PLAN OF CARE
Problem: Patient Care Overview (Adult)  Goal: Plan of Care Review  Outcome: Ongoing (interventions implemented as appropriate)   02/25/19 1256   Coping/Psychosocial   Patient Agreement with Plan of Care agrees   Plan of Care Review   Progress no change   Outcome Summary  received call from patient's mother requesting transfer to [facility] inpatient psych unit.  left message with intake. Waiting to hear back.   Coping/Psychosocial   Plan Of Care Reviewed With patient
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
## 2019-02-25 NOTE — PLAN OF CARE
Problem: Overarching Goals (Adult)  Goal: Optimized Coping Skills in Response to Life Stressors  Outcome: Ongoing (interventions implemented as appropriate)   02/25/19 1222   Overarching Goals   Optimized Coping Skills in Response to Life Stressors making progress toward outcome   Individual Goal Patient will identify two stressors and related healthy strategies to cope.     Goal: Develops/Participates in Therapeutic [place] to Support Successful Transition  Outcome: Ongoing (interventions implemented as appropriate)   02/25/19 1222   Overarching Goals   Individual Goal Patient will continue to attend social work group and discuss community supports for afer d/c from unit.

## 2019-02-25 NOTE — PLAN OF CARE
Problem: Patient Care Overview (Adult)  Goal: Plan of Care Review  Outcome: Ongoing (interventions implemented as appropriate)   02/25/19 1432   Plan of Care Review   Outcome Summary Erika remains flat and actually appears angry, at times. Encouraged to use meditation cards throughout the day to help with intrusive thoughts. Participated in groups. Watching TV or sitting alone outside of group time. Trying to stay out of room. Appears angry at times, but did not volunteer any information about intrusive disturbing thoughts today.  Compliant with meditation.         Problem: Cognitive Impairment (Psychotic Signs/Symptoms) (Adult)  Goal: Improved Thought Clarity/Organization  Outcome: Ongoing (interventions implemented as appropriate)

## 2019-02-25 NOTE — PLAN OF CARE
Problem: Overarching Goals (Adult)  Goal: Adheres to Safety Considerations for Self and Others  Outcome: Ongoing (interventions implemented as appropriate)   02/25/19 1102   Overarching Goals   Adheres to Safety Considerations for Self and Others making progress toward outcome   Individual Goal pt will share suicidal feelings with staff     Goal: Optimized Coping Skills in Response to Life Stressors  Outcome: Ongoing (interventions implemented as appropriate)   02/25/19 1102   Overarching Goals   Optimized Coping Skills in Response to Life Stressors making progress toward outcome   Individual Goal pt will identify healthy coping skills     Goal: Develops/Participates in Therapeutic [place] to Support Successful Transition  Outcome: Ongoing (interventions implemented as appropriate)   02/25/19 1102   Overarching Goals   Develops/Participates in Therapeutic [place] to Support Successful Transition making progress toward outcome   Individual Goal pt will participate in group therapy daily

## 2019-02-25 NOTE — PLAN OF CARE
"Problem: Patient Care Overview (Adult)  Goal: Plan of Care Review  Outcome: Ongoing (interventions implemented as appropriate)   02/24/19 2053   Coping/Psychosocial   Patient Agreement with Plan of Care agrees   Plan of Care Review   Progress no change   Outcome Summary Erika is visible on the unit and minimally social with peers. She was struggling with her intrusive thoughts this evening. She said \"I said something bad, I told my mom and felt like I could kill someone\". She denied any intent or plan to hurt someone, and said she does not want to. She stated \"what is wrong with me\". Breathing techniques were reviewed with the patient and her mother, and they practiced them during visiting hours. Erika also had a PRN Risperdal during visiting hours. She went to wrap up group. Staff will continue to monitor and provide support.    Coping/Psychosocial   Plan Of Care Reviewed With patient         "

## 2019-02-26 PROCEDURE — 99232 SBSQ HOSP IP/OBS MODERATE 35: CPT | Performed by: PSYCHIATRY & NEUROLOGY

## 2019-02-26 PROCEDURE — 63700000 HC SELF-ADMINISTRABLE DRUG: Performed by: PSYCHIATRY & NEUROLOGY

## 2019-02-26 PROCEDURE — 12400000 HC ROOM AND CARE SEMIPRIVATE PSYCH

## 2019-02-26 RX ADMIN — CARBAMAZEPINE 200 MG: 200 TABLET ORAL at 08:22

## 2019-02-26 RX ADMIN — RISPERIDONE 1.5 MG: 0.5 TABLET, ORALLY DISINTEGRATING ORAL at 07:30

## 2019-02-26 RX ADMIN — RISPERIDONE 1.5 MG: 0.5 TABLET, ORALLY DISINTEGRATING ORAL at 16:55

## 2019-02-26 RX ADMIN — CARBAMAZEPINE 200 MG: 200 TABLET ORAL at 12:16

## 2019-02-26 RX ADMIN — QUETIAPINE FUMARATE 100 MG: 100 TABLET ORAL at 20:31

## 2019-02-26 RX ADMIN — PANTOPRAZOLE SODIUM 20 MG: 20 TABLET, DELAYED RELEASE ORAL at 16:55

## 2019-02-26 RX ADMIN — CARBAMAZEPINE 200 MG: 200 TABLET ORAL at 16:55

## 2019-02-26 RX ADMIN — PANTOPRAZOLE SODIUM 20 MG: 20 TABLET, DELAYED RELEASE ORAL at 08:22

## 2019-02-26 RX ADMIN — VENLAFAXINE HYDROCHLORIDE 37.5 MG: 37.5 CAPSULE, EXTENDED RELEASE ORAL at 08:22

## 2019-02-26 NOTE — PLAN OF CARE
Problem: Patient Care Overview (Adult)  Goal: Plan of Care Review  Outcome: Ongoing (interventions implemented as appropriate)   02/25/19 2035   Coping/Psychosocial   Patient Agreement with Plan of Care agrees   Plan of Care Review   Progress improving   Outcome Summary Erika was visible on the unit but minimally social with peers. She brought up her intrusive thoughts only once this shift, and did not get tearful when she did. She was encouraged to keep practicing her deep breathing techniques. Erika said she had a good visit with her mom tonight. She went to wrap up group. Staff will continue to monitor and provide support.    Coping/Psychosocial   Plan Of Care Reviewed With patient

## 2019-02-26 NOTE — PLAN OF CARE
Problem: Patient Care Overview (Adult)  Goal: Plan of Care Review  Outcome: Ongoing (interventions implemented as appropriate)   02/26/19 1310   Coping/Psychosocial   Patient Agreement with Plan of Care agrees   Plan of Care Review   Progress improving   Outcome Summary thoughts more clear and more focused , not tearful today, less staff seeking today, quiet, minimal interactions but more controlled and appropriate, adls improving, appetite good, denies s/i, med comliant, participating in groups, states I feel like i might be a little better, slept 7+ hours last night   Coping/Psychosocial   Plan Of Care Reviewed With patient

## 2019-02-26 NOTE — PLAN OF CARE
Problem: Cognitive Impairment (Psychotic Signs/Symptoms) (Adult)  Goal: Improved Thought Clarity/Organization  Outcome: Ongoing (interventions implemented as appropriate)   02/26/19 8931   Improved Thought Clarity/Organization (Psychotic)   Individual Goal will verbalize ability to manage anxiety thru the day

## 2019-02-26 NOTE — PROGRESS NOTES
Psychiatry Progress Note    Chief Complaint/Reason for follow-up: Mood disorder, autism spectrum disorder, OCD and borderline personality, dependant traits vs pdo    Interval History: Patient is somewhat less focused on obsessive thoughts today, although she still wants to be transferred to another hospital.  She is able to talk about her friends that she sees outside of the hospital, and says that they are planning to come in and visit her here.    Review of Systems:   Sleep:  Good, Appetite: Good and GI: No complaints    Vital Signs for the last 24 hours:  Temp:  [36.7 °C (98 °F)-36.8 °C (98.3 °F)] 36.8 °C (98.3 °F)  Heart Rate:  [78-87] 83  Resp:  [16-18] 18  BP: (139-148)/(67-76) 139/76    Medications  Scheduled    carBAMazepine 200 mg oral TID with meals   pantoprazole 20 mg oral BID   QUEtiapine 100 mg oral Nightly   risperiDONE 1.5 mg oral Daily before breakfast   risperiDONE 1.5 mg oral Daily   venlafaxine XR 37.5 mg oral Daily with breakfast     PRN  hydrOXYzine  •  risperiDONE      Mental Status Exam:  Appearance: appropriate attire  Gait and Motor: slow  Speech: soft  Mood: depressed and anxious, improving  Affect: dysphoric and anxious, improving  Associations: coherent  Thought Process: perseveration and obsessive  Thought Content: obsessions and misperceptions  Suicidality/Homicidality: denies  Judgement/Insight: makes choices that perpetuate illness  Orientation: month, year, season, type of place, name of place, city and situation  Memory: recalls recent events and recalls remote events  Attention: more focused      Assessment/Plan  Unspecified mood (affective) disorder (CMS/[facility])   Assessment & Plan    38-year-old  female with history of mood disorder, autism spectrum disorder, OCD and borderline personality disorder admitted after worsening perseverative thoughts about death and dying.    Increase Risperdal to 1.5 mg am, 1.5 mg 5pm for mood stability  Increase seroquel to 100mg HS for mood  stability and insomnia  Additional risperdal 0.5mg tid prn anxiety / agitation  Tegretol 200mg TID for mood stability  Effexor XR to 37.5 mg for depression, anxiety    Discontinue benzodiazepines for poor response   Promote appropriate mindfulness techniques in response to obsessive anxiety provoking thoughts and behaviors.  AVOID WHENEVER POSSIBLE ASKING PATIENT IF SHE IS HAVING INTRUSIVE THOUGHTS.  Encourage patient to come to staff regularly to just check in.        Patient seen and evaluated for approximately 25 minutes in therapy.    Ga Horne MD  2/26/2019

## 2019-02-27 ENCOUNTER — HOSPITAL ENCOUNTER (INPATIENT)
Facility: HOSPITAL | Age: 39
LOS: 9 days | Discharge: HOME | DRG: 885 | End: 2019-03-08
Attending: PSYCHIATRY & NEUROLOGY | Admitting: PSYCHIATRY & NEUROLOGY
Payer: MEDICARE

## 2019-02-27 VITALS
DIASTOLIC BLOOD PRESSURE: 75 MMHG | TEMPERATURE: 98.5 F | RESPIRATION RATE: 18 BRPM | HEART RATE: 86 BPM | WEIGHT: 199.8 LBS | BODY MASS INDEX: 34.3 KG/M2 | SYSTOLIC BLOOD PRESSURE: 128 MMHG

## 2019-02-27 DIAGNOSIS — F42.9 OBSESSIVE-COMPULSIVE DISORDER, UNSPECIFIED TYPE: ICD-10-CM

## 2019-02-27 DIAGNOSIS — Z00.8 MEDICAL CLEARANCE FOR PSYCHIATRIC ADMISSION: ICD-10-CM

## 2019-02-27 DIAGNOSIS — F31.62 BIPOLAR DISORDER, CURRENT EPISODE MIXED, MODERATE (HCC): ICD-10-CM

## 2019-02-27 DIAGNOSIS — K21.9 GERD (GASTROESOPHAGEAL REFLUX DISEASE): ICD-10-CM

## 2019-02-27 DIAGNOSIS — Z00.8 MEDICAL CLEARANCE FOR PSYCHIATRIC ADMISSION: Primary | ICD-10-CM

## 2019-02-27 DIAGNOSIS — F41.1 GENERALIZED ANXIETY DISORDER: ICD-10-CM

## 2019-02-27 DIAGNOSIS — G47.00 INSOMNIA: Primary | ICD-10-CM

## 2019-02-27 PROCEDURE — 99231 SBSQ HOSP IP/OBS SF/LOW 25: CPT | Performed by: PSYCHIATRY & NEUROLOGY

## 2019-02-27 PROCEDURE — 80307 DRUG TEST PRSMV CHEM ANLYZR: CPT | Performed by: PSYCHIATRY & NEUROLOGY

## 2019-02-27 PROCEDURE — 63700000 HC SELF-ADMINISTRABLE DRUG: Performed by: PSYCHIATRY & NEUROLOGY

## 2019-02-27 RX ORDER — MAGNESIUM HYDROXIDE/ALUMINUM HYDROXICE/SIMETHICONE 120; 1200; 1200 MG/30ML; MG/30ML; MG/30ML
30 SUSPENSION ORAL EVERY 4 HOURS PRN
Status: DISCONTINUED | OUTPATIENT
Start: 2019-02-27 | End: 2019-03-08 | Stop reason: HOSPADM

## 2019-02-27 RX ORDER — QUETIAPINE FUMARATE 100 MG/1
100 TABLET, FILM COATED ORAL NIGHTLY
Qty: 30 TABLET | Refills: 0 | Status: SHIPPED | OUTPATIENT
Start: 2019-02-27 | End: 2019-03-29

## 2019-02-27 RX ORDER — RISPERIDONE 1 MG/1
1.5 TABLET, ORALLY DISINTEGRATING ORAL 2 TIMES DAILY
COMMUNITY
End: 2019-03-08 | Stop reason: HOSPADM

## 2019-02-27 RX ORDER — HYDROXYZINE HYDROCHLORIDE 25 MG/1
25 TABLET, FILM COATED ORAL EVERY 6 HOURS PRN
Status: DISCONTINUED | OUTPATIENT
Start: 2019-02-27 | End: 2019-03-08 | Stop reason: HOSPADM

## 2019-02-27 RX ORDER — BENZTROPINE MESYLATE 1 MG/1
1 TABLET ORAL EVERY 6 HOURS PRN
Status: CANCELLED | OUTPATIENT
Start: 2019-02-27

## 2019-02-27 RX ORDER — ACETAMINOPHEN 325 MG/1
650 TABLET ORAL EVERY 6 HOURS PRN
Status: CANCELLED | OUTPATIENT
Start: 2019-02-27

## 2019-02-27 RX ORDER — CARBAMAZEPINE 200 MG/1
200 TABLET ORAL 3 TIMES DAILY
COMMUNITY
End: 2019-03-08 | Stop reason: HOSPADM

## 2019-02-27 RX ORDER — ACETAMINOPHEN 325 MG/1
650 TABLET ORAL EVERY 6 HOURS PRN
Status: DISCONTINUED | OUTPATIENT
Start: 2019-02-27 | End: 2019-03-01

## 2019-02-27 RX ORDER — MAGNESIUM HYDROXIDE/ALUMINUM HYDROXICE/SIMETHICONE 120; 1200; 1200 MG/30ML; MG/30ML; MG/30ML
30 SUSPENSION ORAL EVERY 4 HOURS PRN
Status: CANCELLED | OUTPATIENT
Start: 2019-02-27

## 2019-02-27 RX ORDER — VENLAFAXINE HYDROCHLORIDE 37.5 MG/1
37.5 CAPSULE, EXTENDED RELEASE ORAL DAILY
COMMUNITY
End: 2019-03-08 | Stop reason: HOSPADM

## 2019-02-27 RX ORDER — PANTOPRAZOLE SODIUM 20 MG/1
20 TABLET, DELAYED RELEASE ORAL 2 TIMES DAILY
Status: ON HOLD | COMMUNITY
End: 2019-03-07 | Stop reason: SDUPTHER

## 2019-02-27 RX ORDER — QUETIAPINE FUMARATE 100 MG/1
100 TABLET, FILM COATED ORAL
COMMUNITY
End: 2019-03-08 | Stop reason: HOSPADM

## 2019-02-27 RX ORDER — RISPERIDONE 3 MG/1
1.5 TABLET ORAL 2 TIMES DAILY
Qty: 30 TABLET | Refills: 0 | Status: SHIPPED | OUTPATIENT
Start: 2019-02-27 | End: 2019-03-29

## 2019-02-27 RX ORDER — BENZTROPINE MESYLATE 1 MG/ML
1 INJECTION INTRAMUSCULAR; INTRAVENOUS EVERY 6 HOURS PRN
Status: CANCELLED | OUTPATIENT
Start: 2019-02-27

## 2019-02-27 RX ORDER — HYDROXYZINE HYDROCHLORIDE 25 MG/1
25 TABLET, FILM COATED ORAL EVERY 6 HOURS PRN
Status: CANCELLED | OUTPATIENT
Start: 2019-02-27

## 2019-02-27 RX ORDER — BENZTROPINE MESYLATE 1 MG/ML
1 INJECTION INTRAMUSCULAR; INTRAVENOUS EVERY 6 HOURS PRN
Status: DISCONTINUED | OUTPATIENT
Start: 2019-02-27 | End: 2019-03-08 | Stop reason: HOSPADM

## 2019-02-27 RX ORDER — BENZTROPINE MESYLATE 1 MG/1
1 TABLET ORAL EVERY 6 HOURS PRN
Status: DISCONTINUED | OUTPATIENT
Start: 2019-02-27 | End: 2019-03-08 | Stop reason: HOSPADM

## 2019-02-27 RX ORDER — RISPERIDONE 0.5 MG/1
1.5 TABLET, ORALLY DISINTEGRATING ORAL
Qty: 90 TABLET | Refills: 0 | Status: SHIPPED | OUTPATIENT
Start: 2019-02-28 | End: 2019-02-27 | Stop reason: HOSPADM

## 2019-02-27 RX ORDER — QUETIAPINE FUMARATE 100 MG/1
100 TABLET, FILM COATED ORAL
Status: CANCELLED | OUTPATIENT
Start: 2019-02-27

## 2019-02-27 RX ORDER — QUETIAPINE FUMARATE 100 MG/1
100 TABLET, FILM COATED ORAL
Status: DISCONTINUED | OUTPATIENT
Start: 2019-02-28 | End: 2019-02-28

## 2019-02-27 RX ORDER — CARBAMAZEPINE 200 MG/1
200 TABLET ORAL 3 TIMES DAILY
Qty: 90 TABLET | Refills: 0 | Status: SHIPPED | OUTPATIENT
Start: 2019-02-27 | End: 2019-03-29

## 2019-02-27 RX ORDER — VENLAFAXINE HYDROCHLORIDE 37.5 MG/1
37.5 CAPSULE, EXTENDED RELEASE ORAL
Qty: 30 CAPSULE | Refills: 0 | Status: SHIPPED | OUTPATIENT
Start: 2019-02-28 | End: 2019-03-30

## 2019-02-27 RX ADMIN — CARBAMAZEPINE 200 MG: 200 TABLET ORAL at 17:15

## 2019-02-27 RX ADMIN — RISPERIDONE 1.5 MG: 0.5 TABLET, ORALLY DISINTEGRATING ORAL at 07:00

## 2019-02-27 RX ADMIN — CARBAMAZEPINE 200 MG: 200 TABLET ORAL at 08:22

## 2019-02-27 RX ADMIN — PANTOPRAZOLE SODIUM 20 MG: 20 TABLET, DELAYED RELEASE ORAL at 17:15

## 2019-02-27 RX ADMIN — HYDROXYZINE HYDROCHLORIDE 50 MG: 25 TABLET ORAL at 13:01

## 2019-02-27 RX ADMIN — CARBAMAZEPINE 200 MG: 200 TABLET ORAL at 12:15

## 2019-02-27 RX ADMIN — VENLAFAXINE HYDROCHLORIDE 37.5 MG: 37.5 CAPSULE, EXTENDED RELEASE ORAL at 08:22

## 2019-02-27 RX ADMIN — RISPERIDONE 1.5 MG: 0.5 TABLET, ORALLY DISINTEGRATING ORAL at 17:15

## 2019-02-27 RX ADMIN — RISPERIDONE 0.5 MG: 0.5 TABLET, ORALLY DISINTEGRATING ORAL at 13:01

## 2019-02-27 RX ADMIN — PANTOPRAZOLE SODIUM 20 MG: 20 TABLET, DELAYED RELEASE ORAL at 08:22

## 2019-02-27 NOTE — PLAN OF CARE
Problem: Cognitive Impairment (Psychotic Signs/Symptoms) (Adult)  Goal: Improved Thought Clarity/Organization   02/27/19 2331   Improved Thought Clarity/Organization (Psychotic)   Individual Goal pt will attend all groups today

## 2019-02-27 NOTE — DISCHARGE INSTR - APPOINTMENTS
[facility] Location-[address] [facility] [address].  [phone].  Transfer on 2/27/19 at 7:30pm by [facility] ambulance

## 2019-02-27 NOTE — PLAN OF CARE
Problem: Patient Care Overview (Adult)  Goal: Plan of Care Review  Outcome: Ongoing (interventions implemented as appropriate)   02/27/19 0908   Coping/Psychosocial   Patient Agreement with Plan of Care agrees   Plan of Care Review   Progress no change   Outcome Summary  discussed d/c planning with espinoza's mother. Patient and her family are requesitng transfer to [facility].  will begin referral process.    Coping/Psychosocial   Plan Of Care Reviewed With patient;family

## 2019-02-27 NOTE — PROGRESS NOTES
"Psychiatry Progress Note    Chief Complaint/Reason for follow-up: Mood disorder, other, autism spectrum disorder, OCD, borderline personality disorder, dependant personality disorder.    Interval History: Patient says she would not hurt herself but perseverates on thoughts of death, anxious and upset.  Staff is redirecting patient off of these thoughts, but she remains perseverative and negativistic. Patient and family are requesting a transfer, mother says \"We really appreciate your help but we are requesting a transfer because we need a different direction.\"      Review of Systems:   Sleep:  Good, Appetite: Good and GI: No complaints    Vital Signs for the last 24 hours:  Temp:  [36.8 °C (98.2 °F)-37.2 °C (98.9 °F)] 37.2 °C (98.9 °F)  Heart Rate:  [76-86] 83  Resp:  [16-18] 18  BP: (137-152)/(64-75) 137/68    Medications  Scheduled    carBAMazepine 200 mg oral TID with meals   pantoprazole 20 mg oral BID   QUEtiapine 100 mg oral Nightly   risperiDONE 1.5 mg oral Daily before breakfast   risperiDONE 1.5 mg oral Daily   venlafaxine XR 37.5 mg oral Daily with breakfast     PRN  hydrOXYzine  •  risperiDONE      Mental Status Exam:  Appearance: appropriate attire  Gait and Motor: Regular  Speech: normal rate/rhythm/volume  Mood: irritable  Affect: restricted and irritable  Associations: coherent  Thought Process: perseveration and obsessive  Thought Content: obsessions and misperceptions  Suicidality/Homicidality: thoughts of being dead/ no desire or plan to die  Judgement/Insight: makes choices that perpetuate illness  Orientation: year, season, type of place, name of place, city and situation  Memory: recalls recent events and recalls remote events  Attention: withdrawn      Assessment/Plan  Unspecified mood (affective) disorder (CMS/[facility])   Assessment & Plan    38-year-old  female with history of mood disorder, autism spectrum disorder, OCD and borderline personality disorder admitted after worsening " perseverative thoughts about death and dying.    Increase Risperdal to 1.5 mg am, 1.5 mg 5pm for mood stability  Increase seroquel to 100mg HS for mood stability and insomnia  Additional risperdal 0.5mg tid prn anxiety / agitation  Tegretol 200mg TID for mood stability  Effexor XR to 37.5 mg for depression, anxiety    Discontinue benzodiazepines for poor response   Promote appropriate mindfulness techniques in response to obsessive anxiety provoking thoughts and behaviors.  AVOID WHENEVER POSSIBLE ASKING PATIENT IF SHE IS HAVING INTRUSIVE THOUGHTS.  Encourage patient to come to staff regularly to just check in.        Patient seen and evaluated for approximately 15 minutes in therapy.    Ga Horne MD  2/27/2019

## 2019-02-27 NOTE — PLAN OF CARE
"Problem: Patient Care Overview (Adult)  Goal: Plan of Care Review  Outcome: Ongoing (interventions implemented as appropriate)   02/27/19 7155   Coping/Psychosocial   Patient Agreement with Plan of Care agrees   Plan of Care Review   Progress no change   Outcome Summary continues labile, guarded, withdrawn, tearful on approach, repeating how she feels \"sick\", feels no improvement since admission and states \"this place is brainwashing me\", did attend am groups, agitated after lunch, crying loudly, given prn atarax and risperdal, sat in recling chair with weighted blanket, did calm after about 10 minutes and went back to her room to sleep, able to cfs at that time, sw working on possible transfer   Coping/Psychosocial   Plan Of Care Reviewed With patient         "

## 2019-02-27 NOTE — PLAN OF CARE
Problem: Patient Care Overview (Adult)  Goal: Plan of Care Review  Outcome: Ongoing (interventions implemented as appropriate)   02/27/19 1424   Coping/Psychosocial   Patient Agreement with Plan of Care agrees   Plan of Care Review   Progress no change   Outcome Summary  received call from [facility] in [place] and they currently do not have bed. They do have bed in [place] location.  gave patient's mother update and she shaid she was interested in patient going to unit in [place].  will make referral to [place] for transfer. Unclear as to transfer time frame as unit has to review patient's clinical info.   Coping/Psychosocial   Plan Of Care Reviewed With patient;family

## 2019-02-27 NOTE — PLAN OF CARE
Problem: Patient Care Overview (Adult)  Goal: Plan of Care Review  Outcome: Ongoing (interventions implemented as appropriate)   02/27/19 1253   Coping/Psychosocial   Patient Agreement with Plan of Care agrees   Plan of Care Review   Progress no change   Outcome Summary  received call back from admissions at [facility] and they are not josé manuel to accept patient due to their unit being too high acuity.  reviewed with patient's mother who explained that after talking with patient's father they are interested in  persuing transfer to [facility]..   Coping/Psychosocial   Plan Of Care Reviewed With patient

## 2019-02-27 NOTE — NURSING NOTE
"Pt to NS around 7 a.m, tearful, c/o not feeling well \"Tired of feeling this way.\" \"Why cant I stop thinking like this? Im not gonna hurt myself or anything like that, but why am I feeling this way?\" Scheduled Risperdal given. Pt encouraged to discuss concerns with MD today. Around 15 minutes later, pt came back to to NS, again tearful, complains of the same issue. Pt encouraged to be patient, and pt lashed out \"youre not listening to me! You're not listening to me!\" Pt states would like to speak with MD and SW today. RN advised pt to write in her journal book specific questions and concerns she would like to discuss with MD and SW, however pt refused to hear RN and stormed off to her room. RN approached pt in her room, pt yelling you're not listening! You just tell me to wait one minute at a time!\" RN explore specific, achieavable ways pt would want RN to help with(explain to pt RN has no control over MD or SW arrival time) But pt unable to name specific way for RN to assist, I just wanna be left alone!\" RN conts to provide emotional support, inform pt that RN will leave room as it is her wish, however RN can be reached at the NS should she need to. Other divisional activities offered but declined by pt \"I'm just gonna stay in my room all day!\"  "

## 2019-02-27 NOTE — PLAN OF CARE
"Problem: Patient Care Overview (Adult)  Goal: Plan of Care Review  Outcome: Ongoing (interventions implemented as appropriate)   02/26/19 1310 02/26/19 1927   Coping/Psychosocial   Patient Agreement with Plan of Care agrees --    Plan of Care Review   Progress --  progress towards functional goals is fair   Outcome Summary --  Pt came to RN station- crying stating she keeps having intrusive thoughts- Pt tearful and given ways to cope positively with anxiety/perseveration- Pt given a notebook to journal- Pt reports she feels no better since admission and wants to talk to MD in am about her medication. Pt grimacing stating her thoughts are not good- denies s/h/i- pt seclusive to room and encouraged to come out and be visible, \"I cant-I just cant\"- will cont to monitor pt and provide support as needed.   Coping/Psychosocial   Plan Of Care Reviewed With patient --          "

## 2019-02-28 PROBLEM — F41.1 GENERALIZED ANXIETY DISORDER: Status: ACTIVE | Noted: 2019-02-28

## 2019-02-28 PROBLEM — F39 MOOD DISORDER (HCC): Status: ACTIVE | Noted: 2019-02-28

## 2019-02-28 PROBLEM — F42.9 OCD (OBSESSIVE COMPULSIVE DISORDER): Status: ACTIVE | Noted: 2019-02-28

## 2019-02-28 LAB
AMPHETAMINES SERPL QL SCN: NEGATIVE
BARBITURATES UR QL: NEGATIVE
BENZODIAZ UR QL: NEGATIVE
BILIRUB UR QL STRIP: NEGATIVE
CLARITY UR: NORMAL
COCAINE UR QL: NEGATIVE
COLOR UR: YELLOW
GLUCOSE UR STRIP-MCNC: NEGATIVE MG/DL
HCG SERPL QL: NEGATIVE
HGB UR QL STRIP.AUTO: NEGATIVE
KETONES UR STRIP-MCNC: NEGATIVE MG/DL
LEUKOCYTE ESTERASE UR QL STRIP: NEGATIVE
METHADONE UR QL: NEGATIVE
NITRITE UR QL STRIP: NEGATIVE
OPIATES UR QL SCN: NEGATIVE
PCP UR QL: NEGATIVE
PH UR STRIP.AUTO: 7 [PH] (ref 4.5–8)
PROT UR STRIP-MCNC: NEGATIVE MG/DL
RPR SER QL: NORMAL
SP GR UR STRIP.AUTO: 1.02 (ref 1–1.03)
THC UR QL: NEGATIVE
TSH SERPL DL<=0.05 MIU/L-ACNC: 2.57 UIU/ML (ref 0.36–3.74)
UROBILINOGEN UR QL STRIP.AUTO: 0.2 E.U./DL

## 2019-02-28 PROCEDURE — 84703 CHORIONIC GONADOTROPIN ASSAY: CPT | Performed by: PSYCHIATRY & NEUROLOGY

## 2019-02-28 PROCEDURE — 86592 SYPHILIS TEST NON-TREP QUAL: CPT | Performed by: PSYCHIATRY & NEUROLOGY

## 2019-02-28 PROCEDURE — 84443 ASSAY THYROID STIM HORMONE: CPT | Performed by: PSYCHIATRY & NEUROLOGY

## 2019-02-28 PROCEDURE — 81003 URINALYSIS AUTO W/O SCOPE: CPT | Performed by: PHYSICIAN ASSISTANT

## 2019-02-28 PROCEDURE — 99223 1ST HOSP IP/OBS HIGH 75: CPT | Performed by: PSYCHIATRY & NEUROLOGY

## 2019-02-28 PROCEDURE — 99221 1ST HOSP IP/OBS SF/LOW 40: CPT | Performed by: PHYSICIAN ASSISTANT

## 2019-02-28 RX ORDER — LORAZEPAM 1 MG/1
1 TABLET ORAL 2 TIMES DAILY
Status: DISCONTINUED | OUTPATIENT
Start: 2019-02-28 | End: 2019-03-08 | Stop reason: HOSPADM

## 2019-02-28 RX ORDER — QUETIAPINE FUMARATE 25 MG/1
50 TABLET, FILM COATED ORAL
Status: DISCONTINUED | OUTPATIENT
Start: 2019-02-28 | End: 2019-03-01

## 2019-02-28 RX ORDER — FLUVOXAMINE MALEATE 50 MG/1
50 TABLET, COATED ORAL
Status: DISCONTINUED | OUTPATIENT
Start: 2019-02-28 | End: 2019-03-02

## 2019-02-28 RX ORDER — DIVALPROEX SODIUM 500 MG/1
500 TABLET, DELAYED RELEASE ORAL EVERY MORNING
Status: DISCONTINUED | OUTPATIENT
Start: 2019-02-28 | End: 2019-03-08 | Stop reason: HOSPADM

## 2019-02-28 RX ORDER — PANTOPRAZOLE SODIUM 20 MG/1
20 TABLET, DELAYED RELEASE ORAL 2 TIMES DAILY
Status: DISCONTINUED | OUTPATIENT
Start: 2019-02-28 | End: 2019-03-08 | Stop reason: HOSPADM

## 2019-02-28 RX ADMIN — PANTOPRAZOLE SODIUM 20 MG: 20 TABLET, DELAYED RELEASE ORAL at 17:10

## 2019-02-28 RX ADMIN — LORAZEPAM 1 MG: 1 TABLET ORAL at 17:10

## 2019-02-28 RX ADMIN — QUETIAPINE FUMARATE 50 MG: 25 TABLET ORAL at 21:13

## 2019-02-28 RX ADMIN — LORAZEPAM 1 MG: 1 TABLET ORAL at 09:48

## 2019-02-28 RX ADMIN — DIVALPROEX SODIUM 500 MG: 500 TABLET, DELAYED RELEASE ORAL at 09:48

## 2019-02-28 RX ADMIN — PANTOPRAZOLE SODIUM 20 MG: 20 TABLET, DELAYED RELEASE ORAL at 09:48

## 2019-02-28 RX ADMIN — DIVALPROEX SODIUM 750 MG: 500 TABLET, DELAYED RELEASE ORAL at 17:10

## 2019-02-28 RX ADMIN — FLUVOXAMINE MALEATE 50 MG: 50 TABLET, FILM COATED ORAL at 21:12

## 2019-02-28 RX ADMIN — HYDROXYZINE HYDROCHLORIDE 25 MG: 25 TABLET, FILM COATED ORAL at 07:08

## 2019-02-28 RX ADMIN — QUETIAPINE FUMARATE 100 MG: 100 TABLET ORAL at 01:02

## 2019-02-28 NOTE — PROGRESS NOTES
Pt is a 89, transferred from [facility] on their behavioral health unit  Pt requested to be transferred and states "I wasn't getting any better at [facility]"  Pt reports initially going to hospital for "disruptive thought" r/t death and dying  Pt states while inpatient at [facility] she began to have thoughts to hurt herself and others  Pt tearful when discussing this and states "I don't know why I'm having these thoughts"  Pt denies having SI/HI prior to admission  Pt CFS on unit  Pt with hx of BPD, mood disorder, OCD, and autism spectrum d/o  Pt states she can only take Levaquin and Clindamycin if antibiotic needed, reports hx of SJS  Reports possible childhood seizure x1  Pt anxious during interview  Staff provided reassurance and support  Awaiting medication orders

## 2019-02-28 NOTE — NURSING NOTE
RN went over discharge instructions with patient and her mother at 1745 and answered questions that they had. Nursing report was given to Sarahi at [facility] at 1800. Ambulance came to  Erika at 2000. Her belongings were given to her at that time, as well as the folder with her prescriptions and discharge instructions. She left the unit with the paramedics at 2004.

## 2019-02-28 NOTE — PROGRESS NOTES
Awake and present in milieu  Observed watching television with her peers at times this AM   Frequently asking to speak with staff  Passive SI, no plan and contracts for safety  States she doesn't have thoughts to hurt self until someone asks the question and then she starts to think of something she did years ago which she feels guilty about  Tried to focus on present day and discussed healthy coping skills, breathing techniques and stress ball  Frequently asking about how long it will take till her medications start working and how she will know if her medications are working  Tearful at times during interaction  Provided listof medications so patient an review with her family members  Currently lives with parents, cat and guinea pigs  Employed and works in the evening cleaning conference rooms  Encouraged groups and patient complied

## 2019-02-28 NOTE — H&P
Psychiatric Evaluation - [address] 41 y o  female MRN: [id number]  Unit/Bed#: U 260-01 Encounter: [id number]    Assessment/Plan   Principal Problem:    Mood disorder ([address] )  Active Problems:    OCD (obsessive compulsive disorder)    Generalized anxiety disorder    Plan:   1  Check admission labs  2  Collaborate with family for baseline assessment and disposition planning  3  Add Depakote  mg a m  And 750 mg after dinner for mood stabilization  Discontinue Tegretol for patient not reporting improvement on this medication  4  Discontinue venlafaxine 37 5 mg and add fluvoxamine 50 mg daily at bedtime for depression and OCD symptoms  5  Reduce dose of Seroquel to 50 mg at bedtime to prevent excessive sedation in early morning grogginess  6  Add Ativan 1 mg b i d  For anxiety management  Risks, benefits and possible side effects of Medications:   Risks, benefits, and possible side effects of medications explained to patient and patient verbalizes understanding  Risks of medications in pregnancy explained if female patient  Patient verbalizes understanding and agrees to notify her doctor if she becomes pregnant  Chief Complaint: "I don't want to go on living like this"    History of Present Illness     Patient is a 41 y o  female presents on 61 51 81 from inpatient psychiatry unit at [facility], where she was admitted for worsening of intrusive thoughts related to death and dying  Patient reports not feeling benefit in the inpatient unit and requested transfer to [facility] for further management  Patient is minimally cooperative during entire evaluation and appear evidently distress from these intrusive thoughts  Patient reports worsening of depression recently with low energy, decline in interest, increased sleeping, increased seclusive behavior, crying and suicidal ideations    Patient also describes recent worsening of irritability, impulsivity and increased goal-directed behavior but will not elaborate on details of her impulsive behaviors  Patient got loud and started perseverating on the fact that she did something wrong in past and she have difficulty stopping these thoughts  Patient denies endorsing psychotic symptoms but terminated the interview early as she do not want to talk about these thoughts in detail  Patient was seen multiple time to finish the evaluation and after discussion patient agreed with the above treatment planning of focusing on OCD symptoms and mood stabilization  Patient is consenting for safety on the unit and is willing to work with primary team recommendation at this point  Stressors: not known    Medical Review Of Systems:  none    Psychiatric Review Of Systems:  sleep: yes  appetite changes: yes  weight changes: no  energy/anergy: yes  interest/pleasure/anhedonia: yes  somatic symptoms: yes  anxiety/panic: yes  lisa: yes  guilty/hopeless: yes  self injurious behavior/risky behavior: yes    Historical Information     Past Psychiatric History:   Patient was recently admitted to [facility] inpatient psychiatry unit and is transferred here for further management    Currently in treatment with outpatient psychiatrist   Past Suicide attempts: denies  Past Violent behavior: denies  Past Psychiatric medication trial: risperidone, seroquel, effexor, tegretol    Substance Abuse History:  denies    Social History     Tobacco History     Smoking Status  Never Smoker    Smokeless Tobacco Use  Never Used          Alcohol History     Alcohol Use Status  Never          Drug Use     Drug Use Status  Never          Sexual Activity     Sexually Active  Not Asked          Activities of Daily Living    Not Asked               Additional Substance Use Detail     Questions Responses    Substance Use Assessment Denies substance use within the past 12 months    Alcohol Use Frequency Denies use in past 12 months    Cannabis frequency Never used    Comment: Never used on 2/27/2019     Heroin Frequency Denies use in past 12 months    Cocaine frequency Never used    Comment: Never used on 2/27/2019     Crack Cocaine Frequency Denies use in past 12 months    Methamphetamine Frequency Denies use in past 12 months    Narcotic Frequency Denies use in past 12 months    Benzodiazepine Frequency Denies use in past 12 months    Amphetamine frequency Denies use in past 12 months    Barbituate Frequency Denies use use in past 12 months    Inhalant frequency Never used    Comment: Never used on 2/27/2019     Hallucinogen frequency Never used    Comment: Never used on 2/27/2019     Ecstasy frequency Never used    Comment: Never used on 2/27/2019     Other drug frequency Never used    Comment: Never used on 2/27/2019     Opiate frequency Denies use in past 12 months    Last reviewed by Antonio Gracia RN on 2/28/2019        I am unable to assess the patient for substance use within the past 12 months as they are unable or unwilling to answer    Family Psychiatric History:   Not known    Social History:  Patient reports good support from parents  Not elaborating on details regarding history of abuse  Patient terminated the interview early and not able to complete social history aspect of this evaluation  Past Medical History:   Diagnosis Date    Autism spectrum disorder     Borderline personality disorder ([facility] [address] )     Mood disorder (HCC)     Obsessive-compulsive disorder     Seizures (HCC)            Meds/Allergies   all current active meds have been reviewed  Allergies   Allergen Reactions    Amoxicillin     Other      Pt reports only abx she can take are Clindaymycin and Levaquin  Reports hx of SJS      Penicillins        Objective   Vital signs in last 24 hours:  Temp:  [96 3 °F (35 7 °C)-98 3 °F (36 8 °C)] 98 1 °F (36 7 °C)  HR:  [83-90] 90  Resp:  [18-20] 20  BP: (127-132)/(61-63) 132/63    No intake or output data in the 24 hours ending 02/28/19 1150    Mental Status Evaluation:  Appearance:  casually dressed   Behavior:  psychomotor agitation   Speech:  loud   Mood:  angry, anxious and depressed   Affect:  labile   Language: naming objects and repeating phrases   Thought Process:  circumstantial   Thought Content:  obsessions   Perceptual Disturbances: None   Risk Potential: Suicidal Ideations without plan, Homicidal Ideations none and Potential for Aggression No   Sensorium:  person and place   Cognition:  grossly intact   Consciousness:  awake    Attention: attention span appeared shorter than expected for age   Intellect: normal   Fund of Knowledge: awareness of current events: fair   Insight:  limited   Judgment: limited   Muscle Strength and Tone: arm(s): bilateral   Gait/Station: normal gait/station   Motor Activity: no abnormal movements     Memory: Short and long term memory  fair       Laboratory results:    I have personally reviewed all pertinent laboratory/tests results    Labs in last 72 hours:   Recent Labs     02/28/19  0007   [id number] 2 573   PREGSERUM Negative   RPR Non-Reactive     Admission Labs:   Admission on 02/27/2019   Component Date Value    Preg, Serum 02/28/2019 Negative     Amph/Meth UR 02/27/2019 Negative     Barbiturate Ur 02/27/2019 Negative     Benzodiazepine Urine 02/27/2019 Negative     Cocaine Urine 02/27/2019 Negative     Methadone Urine 02/27/2019 Negative     Opiate Urine 02/27/2019 Negative     PCP Ur 02/27/2019 Negative     THC Urine 02/27/2019 Negative     RPR 02/28/2019 Non-Reactive     TSH 3RD GENERATON 02/28/2019 2 573     Color, UA 02/28/2019 Yellow     Clarity, UA 02/28/2019 Cloudy     Specific Gravity, UA 02/28/2019 1 020     pH, UA 02/28/2019 7 0     Leukocytes, UA 02/28/2019 Negative     Nitrite, UA 02/28/2019 Negative     Protein, UA 02/28/2019 Negative     Glucose, UA 02/28/2019 Negative     Ketones, UA 02/28/2019 Negative     Urobilinogen, UA 02/28/2019 0 2     Bilirubin, UA 02/28/2019 Negative     Blood, UA 02/28/2019 Negative      Risks / Benefits of Treatment:     Risks, benefits, and possible side effects of medications explained to patient  The patient verbalizes understanding and agreement for treatment  Counseling / Coordination of Care:     Patient's presentation on admission and proposed treatment plan discussed with treatment team   Diagnosis, medication changes and treatment plan reviewed with patient  Recent stressors discussed with patient     Events leading to admission reviewed with patient  Importance of medication and treatment compliance reviewed with patient  Inpatient Psychiatric Certification:     Certification: Based upon physical, mental and social evaluations, I certify that inpatient psychiatric services are medically necessary for this patient for a duration of 7 midnights for the treatment of Mood disorder [facility])    Available alternative community resources do not meet the patient's mental health care needs  I further attest that an established written individualized plan of care has been implemented and is outlined in the patient's medical records  This note has been constructed using a voice recognition system  There may be translation, syntax,  or grammatical errors  If you have any questions, please contact the dictating provider

## 2019-02-28 NOTE — PROGRESS NOTES
Belongings brought in with patient: At bedside:    Shirt x4  Jackets x2  Pants x4  Slippers  Bra x2  Underwear x4  Socks x5  PJ set  Panty Liners  Tooth brush  Tooth paste  Hair brush  Hair tie  Deodorant    In locker 260-1 + Closet #255    Duffle Bag  Cat pillow  Shirts x8  [place]  Coats x2  Hoodie x3  Pants x4  Socks x10  Bag w/ books, puzzles, paperwork   misc    Shoes x1  Underwear/spanx x13  Bible

## 2019-02-28 NOTE — TREATMENT PLAN
TREATMENT PLAN REVIEW - [facility] Maria Elena Calvillo 41 y o  1980 female MRN: [id number]    [address] [place] [address] Room / Bed: [facility] 260/[facility] 260-01 Encounter: [id number]          Admit Date/Time:  2/27/2019  9:48 PM    Treatment Team: Attending Provider: Winston Bocanegra; Patient Care Assistant: Lc Russo; Nursing Student: Panchito Blanco; Registered Nurse: J Carlos Cazares RN; Patient Care Technician: Boby Alejandra; Registered Nurse: Josey Kohli RN; Occupational Therapy Assistant: DONNA Galindo; : Bao Goncalves    Diagnosis: Principal Problem:    Mood disorder [facility]  Active Problems:    OCD (obsessive compulsive disorder)    Generalized anxiety disorder  r/o bipolar disorder vs impulse control disorder    Patient Strengths/Assets: cooperative, compliant with medication, motivation for treatment/growth, patient is on a voluntary commitment    Patient Barriers/Limitations: low self esteem    Short Term Goals: decrease in depressive symptoms, decrease in anxiety symptoms, decrease in suicidal thoughts    Long Term Goals: improvement in depression, improvement in anxiety, stabilization of mood, free of suicidal thoughts, no self abusive behavior, acceptance of need for psychiatric medications, acceptance of need for psychiatric treatment, acceptance of need for psychiatric follow up after discharge    Progress Towards Goals: starting psychiatric medications as prescribed    Recommended Treatment: medication management, patient medication education, group therapy, milieu therapy, continued Behavioral Health psychiatric evaluation/assessment process    Treatment Frequency: daily medication monitoring, group and milieu therapy daily, monitoring through interdisciplinary rounds, monitoring through weekly patient care conferences    Expected Discharge Date: 7-10 days    Discharge Plan: referral for outpatient medication management with a psychiatrist, referral for outpatient psychotherapy    Treatment Plan Created/Updated By: Jl Timmons

## 2019-02-28 NOTE — PROGRESS NOTES
Pt reports to writer she "had one of my better days in a while today"  Reports having a rough morning but then decided to focus on managing anxiety today  Pt states she is trying to use "deep breathing" and "staying in the moment" as coping skills  Pt is visible on the unit, currently calm and cooperative  Will continue to monitor and support

## 2019-03-01 LAB
ALBUMIN SERPL BCP-MCNC: 3.5 G/DL (ref 3.5–5)
ALP SERPL-CCNC: 58 U/L (ref 46–116)
ALT SERPL W P-5'-P-CCNC: 18 U/L (ref 12–78)
ANION GAP SERPL CALCULATED.3IONS-SCNC: 8 MMOL/L (ref 4–13)
AST SERPL W P-5'-P-CCNC: 7 U/L (ref 5–45)
BASOPHILS # BLD AUTO: 0.04 THOUSANDS/ΜL (ref 0–0.1)
BASOPHILS NFR BLD AUTO: 1 % (ref 0–1)
BILIRUB SERPL-MCNC: 0.3 MG/DL (ref 0.2–1)
BUN SERPL-MCNC: 11 MG/DL (ref 5–25)
CALCIUM SERPL-MCNC: 8.3 MG/DL (ref 8.3–10.1)
CHLORIDE SERPL-SCNC: 106 MMOL/L (ref 100–108)
CHOLEST SERPL-MCNC: 152 MG/DL (ref 50–200)
CO2 SERPL-SCNC: 29 MMOL/L (ref 21–32)
CREAT SERPL-MCNC: 0.75 MG/DL (ref 0.6–1.3)
EOSINOPHIL # BLD AUTO: 0.24 THOUSAND/ΜL (ref 0–0.61)
EOSINOPHIL NFR BLD AUTO: 3 % (ref 0–6)
ERYTHROCYTE [DISTWIDTH] IN BLOOD BY AUTOMATED COUNT: 12.9 % (ref 11.6–15.1)
EST. AVERAGE GLUCOSE BLD GHB EST-MCNC: 111 MG/DL
GFR SERPL CREATININE-BSD FRML MDRD: 102 ML/MIN/1.73SQ M
GLUCOSE P FAST SERPL-MCNC: 91 MG/DL (ref 65–99)
GLUCOSE SERPL-MCNC: 91 MG/DL (ref 65–140)
HBA1C MFR BLD: 5.5 % (ref 4.2–6.3)
HCT VFR BLD AUTO: 41.9 % (ref 34.8–46.1)
HDLC SERPL-MCNC: 41 MG/DL (ref 40–60)
HGB BLD-MCNC: 13.9 G/DL (ref 11.5–15.4)
IMM GRANULOCYTES # BLD AUTO: 0.02 THOUSAND/UL (ref 0–0.2)
IMM GRANULOCYTES NFR BLD AUTO: 0 % (ref 0–2)
LDLC SERPL CALC-MCNC: 95 MG/DL (ref 0–100)
LYMPHOCYTES # BLD AUTO: 3.14 THOUSANDS/ΜL (ref 0.6–4.47)
LYMPHOCYTES NFR BLD AUTO: 42 % (ref 14–44)
MCH RBC QN AUTO: 32 PG (ref 26.8–34.3)
MCHC RBC AUTO-ENTMCNC: 33.2 G/DL (ref 31.4–37.4)
MCV RBC AUTO: 97 FL (ref 82–98)
MONOCYTES # BLD AUTO: 0.71 THOUSAND/ΜL (ref 0.17–1.22)
MONOCYTES NFR BLD AUTO: 10 % (ref 4–12)
NEUTROPHILS # BLD AUTO: 3.32 THOUSANDS/ΜL (ref 1.85–7.62)
NEUTS SEG NFR BLD AUTO: 44 % (ref 43–75)
NONHDLC SERPL-MCNC: 111 MG/DL
NRBC BLD AUTO-RTO: 0 /100 WBCS
PLATELET # BLD AUTO: 195 THOUSANDS/UL (ref 149–390)
PMV BLD AUTO: 12.4 FL (ref 8.9–12.7)
POTASSIUM SERPL-SCNC: 4.4 MMOL/L (ref 3.5–5.3)
PROT SERPL-MCNC: 6.3 G/DL (ref 6.4–8.2)
RBC # BLD AUTO: 4.34 MILLION/UL (ref 3.81–5.12)
SODIUM SERPL-SCNC: 143 MMOL/L (ref 136–145)
TRIGL SERPL-MCNC: 78 MG/DL
WBC # BLD AUTO: 7.47 THOUSAND/UL (ref 4.31–10.16)

## 2019-03-01 PROCEDURE — 80061 LIPID PANEL: CPT | Performed by: PSYCHIATRY & NEUROLOGY

## 2019-03-01 PROCEDURE — 80053 COMPREHEN METABOLIC PANEL: CPT | Performed by: PSYCHIATRY & NEUROLOGY

## 2019-03-01 PROCEDURE — 83036 HEMOGLOBIN GLYCOSYLATED A1C: CPT | Performed by: PHYSICIAN ASSISTANT

## 2019-03-01 PROCEDURE — 85025 COMPLETE CBC W/AUTO DIFF WBC: CPT | Performed by: PSYCHIATRY & NEUROLOGY

## 2019-03-01 PROCEDURE — 99232 SBSQ HOSP IP/OBS MODERATE 35: CPT | Performed by: PSYCHIATRY & NEUROLOGY

## 2019-03-01 RX ORDER — HALOPERIDOL 5 MG
5 TABLET ORAL EVERY 6 HOURS PRN
Status: DISCONTINUED | OUTPATIENT
Start: 2019-03-01 | End: 2019-03-08 | Stop reason: HOSPADM

## 2019-03-01 RX ORDER — IBUPROFEN 800 MG/1
800 TABLET ORAL EVERY 8 HOURS PRN
Status: DISCONTINUED | OUTPATIENT
Start: 2019-03-01 | End: 2019-03-08 | Stop reason: HOSPADM

## 2019-03-01 RX ORDER — LANOLIN ALCOHOL/MO/W.PET/CERES
3 CREAM (GRAM) TOPICAL
Status: DISCONTINUED | OUTPATIENT
Start: 2019-03-01 | End: 2019-03-08 | Stop reason: HOSPADM

## 2019-03-01 RX ORDER — ACETAMINOPHEN 325 MG/1
325 TABLET ORAL EVERY 6 HOURS PRN
Status: DISCONTINUED | OUTPATIENT
Start: 2019-03-01 | End: 2019-03-08 | Stop reason: HOSPADM

## 2019-03-01 RX ORDER — LORAZEPAM 2 MG/ML
2 INJECTION INTRAMUSCULAR EVERY 6 HOURS PRN
Status: DISCONTINUED | OUTPATIENT
Start: 2019-03-01 | End: 2019-03-08 | Stop reason: HOSPADM

## 2019-03-01 RX ORDER — LORAZEPAM 0.5 MG/1
0.5 TABLET ORAL EVERY 8 HOURS PRN
Status: DISCONTINUED | OUTPATIENT
Start: 2019-03-01 | End: 2019-03-08 | Stop reason: HOSPADM

## 2019-03-01 RX ORDER — ZOLPIDEM TARTRATE 5 MG/1
10 TABLET ORAL
Status: DISCONTINUED | OUTPATIENT
Start: 2019-03-01 | End: 2019-03-08 | Stop reason: HOSPADM

## 2019-03-01 RX ORDER — RISPERIDONE 1 MG/1
1 TABLET, ORALLY DISINTEGRATING ORAL
Status: DISCONTINUED | OUTPATIENT
Start: 2019-03-01 | End: 2019-03-08 | Stop reason: HOSPADM

## 2019-03-01 RX ORDER — TRAZODONE HYDROCHLORIDE 50 MG/1
50 TABLET ORAL
Status: DISCONTINUED | OUTPATIENT
Start: 2019-03-01 | End: 2019-03-08 | Stop reason: HOSPADM

## 2019-03-01 RX ORDER — OLANZAPINE 10 MG/1
10 INJECTION, POWDER, LYOPHILIZED, FOR SOLUTION INTRAMUSCULAR EVERY 8 HOURS PRN
Status: DISCONTINUED | OUTPATIENT
Start: 2019-03-01 | End: 2019-03-08 | Stop reason: HOSPADM

## 2019-03-01 RX ORDER — OLANZAPINE 10 MG/1
10 TABLET ORAL EVERY 8 HOURS PRN
Status: DISCONTINUED | OUTPATIENT
Start: 2019-03-01 | End: 2019-03-08 | Stop reason: HOSPADM

## 2019-03-01 RX ORDER — ACETAMINOPHEN 325 MG/1
650 TABLET ORAL EVERY 6 HOURS PRN
Status: DISCONTINUED | OUTPATIENT
Start: 2019-03-01 | End: 2019-03-08 | Stop reason: HOSPADM

## 2019-03-01 RX ORDER — HALOPERIDOL 5 MG/ML
5 INJECTION INTRAMUSCULAR EVERY 6 HOURS PRN
Status: DISCONTINUED | OUTPATIENT
Start: 2019-03-01 | End: 2019-03-08 | Stop reason: HOSPADM

## 2019-03-01 RX ADMIN — LORAZEPAM 1 MG: 1 TABLET ORAL at 08:06

## 2019-03-01 RX ADMIN — LORAZEPAM 1 MG: 1 TABLET ORAL at 17:09

## 2019-03-01 RX ADMIN — DIVALPROEX SODIUM 750 MG: 500 TABLET, DELAYED RELEASE ORAL at 17:08

## 2019-03-01 RX ADMIN — DIVALPROEX SODIUM 500 MG: 500 TABLET, DELAYED RELEASE ORAL at 08:06

## 2019-03-01 RX ADMIN — PANTOPRAZOLE SODIUM 20 MG: 20 TABLET, DELAYED RELEASE ORAL at 08:06

## 2019-03-01 RX ADMIN — ZOLPIDEM TARTRATE 10 MG: 5 TABLET, FILM COATED ORAL at 21:16

## 2019-03-01 RX ADMIN — MELATONIN 3 MG: 3 TAB ORAL at 21:16

## 2019-03-01 RX ADMIN — FLUVOXAMINE MALEATE 50 MG: 50 TABLET, FILM COATED ORAL at 21:16

## 2019-03-01 RX ADMIN — PANTOPRAZOLE SODIUM 20 MG: 20 TABLET, DELAYED RELEASE ORAL at 17:08

## 2019-03-01 NOTE — CASE MANAGEMENT
CM & Pt reviewed & signed Tx Plan & Pt was provided a signed copy  CM & Pt reviewed & signed ROIs for Ashley Mata(parents), [facility] - Dr Le Meraz & Itzel Alston), Dr Briscoe(PCP), & [facility](Autism Waiver supports)  Pt is a 42 y/o female who reported that she was hospitalized about 2 weeks before [place]; she reported her job was a stressor & they were not understanding that she wasn't doing well  Pt said that she was inpatient at [facility], and then went to [facility] at [address] said that while at [facility], she felt herself start to spiral downward again, & described the program as very intensive  Pt said that after about 5 weeks, she went back to the hospital, & was re-admitted to [facility]  Pt said that she was there for 19 days & felt she was doing worse, instead of improving, & she requested a transfer  Pt is single & has no children  She said that prior to her admission December, she was living in her own apartment, however, since that time has been staying with her parents  Pt said that they are supportive & she continues to pay for her apartment, & hopes to one day return, but she most likely will stay with her parents again at discharge  Pt denied having any siblings & denied any family history of mental illness  Pt reported that her first mental health hospitalization was about 5 years ago at [facility]   She denied any prior suicide attempts or any history of trauma/abuse; she then said she was bullied throughout school  Pt currently goes to [facility] for outpatient & sees Dr Le Meraz & therapist Vandana Greene twice/week  Pt said that she also has services through the Autism wavier, & has a behavioral specialist, Shyann Guzmán & another worker, Ashley Shanks through [facility]    Pt said that [place] works with her 3 5 hrs twice per week & Vandana Greene use to work with her every morning through the week; Jonny Canales also helps monitor her medications  Pt said that her PCP is Dr Madina Trinidad, & she denied any chronic or acute medical conditions  Pt denied any drug, alcohol, or tobacco use  Pt reported that she attends Zoroastrianism sometimes, but religions isn't a major part of her life  Pt reported that she completed some college  Pt said that she was trying to get her associates degree in some type of clerical work, however, it was a lot of money & she was working a fulltime job & part time job at the time  Pt said she also requires a , so she isn't sure she will ever finish her degree, as she can only take 1 class at at time  Pt denied any legal issues  Pt denied having access to firearms in the home  Pt said that she drives herself independently to appointments  Pt reported that her goal for this hospitalization is to not want to think about hurting herself or others & have clear mind  Pt reported coping skills of deep breathing & reading words of affirmation  Pt was hopeful today stating last night when her mom & aunt visited, she felt better than she had in a long time & she seemed more like her old self  CM contacted [employer] @ [phone] & spoke with Isidro Willis, who asked that CM fax a FACEsheet & medication list to [phone], so that she could tentatively schedule Pt appointments; ESTELLA sent what was requested  CM contacted [address] @ [phone] & spoke with Krystal Tai, who reported Pt did not have any scheduled appointments  CM contacted [address] @ [phone] & had to leave her contact information with Tawanda Salinas who said he would have [facility] or Jonny Canales call CM back

## 2019-03-01 NOTE — PROGRESS NOTES
Pt reports feeling like she is "panicking"  Pt reports stress over only being able to have 3 days worth of clothing and feeling like she is not getting better  RN and other staff have explained to pt she can switch out clothes from locker as needed  RN encouraged pt to use learned coping skills and reminded pt of her progress since admission  Pt asks "How do people deal with negative thoughts?"  Pt talked about something she did in the past that she can't move on from  Again, RN provided support and encouragement and discussed various coping skills  Pt receptive  Will continue to monitor and support

## 2019-03-01 NOTE — CONSULTS
Consultation - Kayy Polo 41 y o  female MRN: [id number]    Unit/Bed#: [facility] 260-01 Encounter: [id number]      Assessment/Plan     Mood disorder/OCD  Medically cleared for inpatient psychiatric evaluation and treatment    History of Present Illness   HPI: Kayy Polo is a 41y o  year old female who presents with increased depression secondary to her medications no longer worker  Patient denies chronic health issues, recent illness, open wounds, or pain  Inpatient consult for Medical Clearance for [facility] patient  Consult performed by: Heather Gonzalez PA-C  Consult ordered by: Sigrid Guerrero MD          Review of Systems   Constitutional: Negative  HENT: Negative  Eyes: Negative  Respiratory: Negative  Cardiovascular: Negative  Gastrointestinal: Negative  Genitourinary: Negative  Musculoskeletal: Negative  Skin: Negative  Neurological: Negative  Psychiatric/Behavioral: Positive for decreased concentration, dysphoric mood and suicidal ideas  Negative for agitation  The patient is hyperactive  Historical Information   Past Medical History:   Diagnosis Date    Autism spectrum disorder     Borderline personality disorder ([address] )     Mood disorder (HCC)     Obsessive-compulsive disorder     Seizures (HCC)      No past surgical history on file  Social History   Social History     Substance and Sexual Activity   Alcohol Use Never    Frequency: Never    Binge frequency: Never     Social History     Substance and Sexual Activity   Drug Use Never     Social History     Tobacco Use   Smoking Status Never Smoker   Smokeless Tobacco Never Used     Family History: non-contributory    Meds/Allergies   PTA meds:   Prior to Admission Medications   Prescriptions Last Dose Informant Patient Reported? Taking?    QUEtiapine (SEROquel) 100 mg tablet 2/26/2019 at Unknown time  Yes Yes   Sig: Take 100 mg by mouth daily at bedtime   carBAMazepine (TEGretol) 200 mg tablet 2/27/2019 at Unknown time  Yes Yes   Sig: Take 200 mg by mouth 3 (three) times a day With meals   pantoprazole (PROTONIX) 20 mg tablet 2/27/2019 at Unknown time  Yes Yes   Sig: Take 20 mg by mouth 2 (two) times a day   risperiDONE (RisperDAL M-TABS) 1 mg dispersible tablet 2/27/2019 at Unknown time  Yes Yes   Sig: Take 1 5 mg by mouth 2 (two) times a day Before breakfast and at 1700   venlafaxine (EFFEXOR-XR) 37 5 mg 24 hr capsule Unknown at Unknown time  Yes No   Sig: Take 37 5 mg by mouth daily With breakfast      Facility-Administered Medications: None     Allergies   Allergen Reactions    Amoxicillin     Other      Pt reports only abx she can take are Clindaymycin and Levaquin  Reports hx of SJS   Penicillins        Objective   Vitals: Blood pressure 122/53, pulse 79, temperature 98 6 °F (37 °C), temperature source Tympanic, resp  rate 14, height 5' 4" (1 626 m), weight 91 2 kg (201 lb), SpO2 98 %  No intake or output data in the 24 hours ending 02/28/19 2008  Invasive Devices          None          Physical Exam   Constitutional: She is oriented to person, place, and time  She appears well-developed and well-nourished  No distress  HENT:   Head: Normocephalic and atraumatic  Eyes: Pupils are equal, round, and reactive to light  EOM are normal    Neck: Normal range of motion  Cardiovascular: Normal rate, regular rhythm and normal heart sounds  Exam reveals no gallop and no friction rub  No murmur heard  Pulmonary/Chest: Effort normal  No stridor  She has no wheezes  She has no rales  Abdominal: Soft  Bowel sounds are normal  She exhibits no distension and no mass  There is no rebound and no guarding  Neurological: She is alert and oriented to person, place, and time  Skin: Skin is warm and dry  Psychiatric: Her speech is normal and behavior is normal  Thought content normal  Her mood appears anxious  She exhibits a depressed mood  Lab Results: I have personally reviewed pertinent reports      Imaging Studies: I have personally reviewed pertinent reports

## 2019-03-01 NOTE — PROGRESS NOTES
Belongings Sent Home with Mother    2 blankets  Pillow  Large pack of markers  Heart box with messages  Snoopy stuffed animal  calligraphy   Papers  Photographs  Sticker book  Books  Sponge  Card game  Fuzzy sweater  Shoes  Duffle bag

## 2019-03-01 NOTE — PROGRESS NOTES
Patient appears to have slept throughout the night waking up around 0500 walking out to nurses station looking around then returned to her room and appears to be resting  Returned to nurses station at [facility] inquiring about shower time  After given times that shower rooms are available, patient requesting someone to wake her at 0600  Patient observed by this writer having flat affect and limited eye contact

## 2019-03-01 NOTE — PROGRESS NOTES
Patient is pleasant and cooperative during conversation  Reports feeling better now that her BP is improving  Velvet Mcghee it was running high at previous facility  States her appetite is fair and had difficulty sleeping last night  Pt is compliant with meds  Says she had a positive visit with her mom and aunt yesterday evening  Denies SI/HI/AVH  Contracts for safety  Reports feeling anxious  Pt spoke about her "break down" prior to hospitalization where she was extremely focused on death  She feels she is preservating about less  She said there is "something I did a long time ago" that she is ashamed of and "I will never do it again " She stated her anxiety is increasing and would prefer to end conversation  Provided patient with sound machine  Will continue to monitor

## 2019-03-01 NOTE — PROGRESS NOTES
Progress Note - Behavioral Health   Maria L Rolle 41 y o  female MRN: [id number]  Unit/Bed#: [facility] 260-01 Encounter: [id number]    Assessment/Plan   Principal Problem:    Mood disorder ([address] )  Active Problems:    OCD (obsessive compulsive disorder)    Generalized anxiety disorder    Subjective:  Patient is compliant with medication with no acute side effects  Patient is less angry and labile during evaluation and does report tolerating medication and feels small improvement in mood and anxiety  Patient denies endorsing psychotic symptoms  Patient in agreement with switching Seroquel to Ambien to help with sleep  Patient does report having good visit with family and is looking forward to getting better and working with outpatient provider on discharge  She is seen more out in milieu today and remained cooperative and calmed during entire evaluation  She is consenting for safety on the unit      Current Medications:    Current Facility-Administered Medications:  acetaminophen 325 mg Oral Q6H PRN Pierce Shook PA-C   acetaminophen 650 mg Oral Q6H PRN Pierce Shook PA-C   aluminum-magnesium hydroxide-simethicone 30 mL Oral Q4H PRN Mark Peña MD   benztropine 1 mg Intramuscular Q6H PRN Mark Peña MD   benztropine 1 mg Oral Q6H PRN Mark Peña MD   divalproex sodium 500 mg Oral QAM Pierce Shook PA-C   divalproex sodium 750 mg Oral After Amato Soup, ANISA   fluvoxaMINE 50 mg Oral HS Pierce Shook PA-C   haloperidol 5 mg Oral Q6H PRN Pierce Shook PA-C   haloperidol lactate 5 mg Intramuscular Q6H PRN Pierce Shook PA-C   hydrOXYzine HCL 25 mg Oral Q6H PRN Mark Peña MD   ibuprofen 800 mg Oral Q8H PRN Pierce Shook PA-C   LORazepam 2 mg Intramuscular Q6H PRN Pierce Shook PA-C   LORazepam 0 5 mg Oral Q8H PRN Pierce Shook PA-C   LORazepam 1 mg Oral BID Pierce Shook PA-C   magnesium hydroxide 30 mL Oral Daily PRN Mark Peña MD   melatonin 3 mg Oral HS Tyler Valenzuela   OLANZapine 10 mg Intramuscular Q8H PRN Bernarda , PA-C   OLANZapine 10 mg Oral Q8H PRN Bernarda , PA-C   pantoprazole 20 mg Oral BID Tyler Valenzuela   risperiDONE 1 mg Oral Q3H PRN Bernarda , PA-C   traZODone 50 mg Oral HS PRN Bernarda , PA-C   zolpidem 10 mg Oral HS [address] Medications: all current active meds have been reviewed  Vital signs in last 24 hours:  Temp:  [97 7 °F (36 5 °C)-98 6 °F (37 °C)] 98 2 °F (36 8 °C)  HR:  [79-88] 82  Resp:  [14-18] 18  BP: (122-138)/(53-61) 128/60    Laboratory results:    I have personally reviewed all pertinent laboratory/tests results    Labs in last 72 hours:   Recent Labs     02/28/19  0007 03/01/19  0556   WBC  --  7 47   RBC  --  4 34   HGB  --  13 9   HCT  --  41 9   PLT  --  195   RDW  --  12 9   NEUTROABS  --  3 32   SODIUM  --  143   K  --  4 4   CL  --  106   CO2  --  29   BUN  --  11   CREATININE  --  0 75   GLUC  --  91   GLUF  --  91   CALCIUM  --  8 3   AST  --  7   ALT  --  18   ALKPHOS  --  58   TP  --  6 3*   ALB  --  3 5   TBILI  --  0 30   CHOLESTEROL  --  152   HDL  --  41   TRIG  --  78   LDLCALC  --  95   [id number] 2 573  --    PREGSERUM Negative  --    RPR Non-Reactive  --      Admission Labs:   Admission on 02/27/2019   Component Date Value    Preg, Serum 02/28/2019 Negative     Amph/Meth UR 02/27/2019 Negative     Barbiturate Ur 02/27/2019 Negative     Benzodiazepine Urine 02/27/2019 Negative     Cocaine Urine 02/27/2019 Negative     Methadone Urine 02/27/2019 Negative     Opiate Urine 02/27/2019 Negative     PCP Ur 02/27/2019 Negative     THC Urine 02/27/2019 Negative     RPR 02/28/2019 Non-Reactive     TSH 3RD GENERATON 02/28/2019 2 573     Color, UA 02/28/2019 Yellow     Clarity, UA 02/28/2019 Cloudy     Specific Gravity, UA 02/28/2019 1 020     pH, UA 02/28/2019 7 0     Leukocytes, UA 02/28/2019 Negative     Nitrite, UA 02/28/2019 Negative  Protein, UA 02/28/2019 Negative     Glucose, UA 02/28/2019 Negative     Ketones, UA 02/28/2019 Negative     Urobilinogen, UA 02/28/2019 0 2     Bilirubin, UA 02/28/2019 Negative     Blood, UA 02/28/2019 Negative     Hemoglobin A1C 03/01/2019 5 5     EAG 03/01/2019 111     WBC 03/01/2019 7 47     RBC 03/01/2019 4 34     Hemoglobin 03/01/2019 13 9     Hematocrit 03/01/2019 41 9     MCV 03/01/2019 97     MCH 03/01/2019 32 0     MCHC 03/01/2019 33 2     RDW 03/01/2019 12 9     MPV 03/01/2019 12 4     Platelets 87/26/8932 195     nRBC 03/01/2019 0     Neutrophils Relative 03/01/2019 44     Immat GRANS % 03/01/2019 0     Lymphocytes Relative 03/01/2019 42     Monocytes Relative 03/01/2019 10     Eosinophils Relative 03/01/2019 3     Basophils Relative 03/01/2019 1     Neutrophils Absolute 03/01/2019 3 32     Immature Grans Absolute 03/01/2019 0 02     Lymphocytes Absolute 03/01/2019 3 14     Monocytes Absolute 03/01/2019 0 71     Eosinophils Absolute 03/01/2019 0 24     Basophils Absolute 03/01/2019 0 04     Sodium 03/01/2019 143     Potassium 03/01/2019 4 4     Chloride 03/01/2019 106     CO2 03/01/2019 29     ANION GAP 03/01/2019 8     BUN 03/01/2019 11     Creatinine 03/01/2019 0 75     Glucose 03/01/2019 91     Glucose, Fasting 03/01/2019 91     Calcium 03/01/2019 8 3     AST 03/01/2019 7     ALT 03/01/2019 18     Alkaline Phosphatase 03/01/2019 58     Total Protein 03/01/2019 6 3*    Albumin 03/01/2019 3 5     Total Bilirubin 03/01/2019 0 30     eGFR 03/01/2019 102     Cholesterol 03/01/2019 152     Triglycerides 03/01/2019 78     HDL, Direct 03/01/2019 41     LDL Calculated 03/01/2019 95     Non-HDL-Chol (CHOL-HDL) 03/01/2019 111        Psychiatric Review of Systems:  Behavior over the last 24 hours:  Slow improvement  Sleep: insomnia  Appetite: normal  Medication side effects: No  ROS: no complaints    Mental Status Evaluation:  Appearance:  casually dressed Behavior:  guarded   Speech:  soft   Mood:  anxious and depressed   Affect:  increased in range   Language naming objects and repeating phrases   Thought Process:  circumstantial   Thought Content:  obsessions   Perceptual Disturbances: None   Risk Potential: Suicidal Ideations without plan, Homicidal Ideations none and Potential for Aggression No   Sensorium:  person and place   Cognition:  grossly intact   Consciousness:  awake    Attention: attention span appeared shorter than expected for age   Insight:  limited   Judgment: limited   Intellect fair   Gait/Station: normal gait/station   Motor Activity: no abnormal movements     Memory: Short and long term memory  fair     Progress Toward Goals: slow progress    Recommended Treatment:   Add Ambien 10 mg with melatonin 3 mg at bedtime for insomnia management  Discontinue Seroquel as patient not meeting criteria for psychosis at this point  Check Depakote level after 3 days of consistent dosing  Continue with group therapy, milieu therapy and occupational therapy      Continue following current medications:   Current Facility-Administered Medications:  acetaminophen 325 mg Oral Q6H PRN Melvena Zeus, PA-C   acetaminophen 650 mg Oral Q6H PRN Melvena Zeus, PA-C   aluminum-magnesium hydroxide-simethicone 30 mL Oral Q4H PRN Lalita Drilling, MD   benztropine 1 mg Intramuscular Q6H PRN Lalita Drilling, MD   benztropine 1 mg Oral Q6H PRN Lalita Drilling, MD   divalproex sodium 500 mg Oral QAM Melvena Zeus, ANISA   divalproex sodium 750 mg Oral After A[employer]o Soup, PA-C   fluvoxaMINE 50 mg Oral HS Melvena Zeus, PA-C   haloperidol 5 mg Oral Q6H PRN Melvena Zeus, PA-C   haloperidol lactate 5 mg Intramuscular Q6H PRN Melvena Zeus, PA-C   hydrOXYzine HCL 25 mg Oral Q6H PRN Lalita Drilling, MD   ibuprofen 800 mg Oral Q8H PRN Melvena Zeus, PA-C   LORazepam 2 mg Intramuscular Q6H PRN Melvena Zeus, PA-C   LORazepam 0 5 mg Oral Q8H PRN Lopez Tracie Joaquina Fuentes PA-C   LORazepam 1 mg Oral BID Deretha Rockwall, PAHazelC   magnesium hydroxide 30 mL Oral Daily PRN Marcia Denise MD   melatonin 3 mg Oral HS Tylerteresita Valenzuela   OLANZapine 10 mg Intramuscular Q8H PRN Deretha Rockwall, PA-C   OLANZapine 10 mg Oral Q8H PRN Deretha Cloud, PA-C   pantoprazole 20 mg Oral BID Tylerteresita Valenzuela   risperiDONE 1 mg Oral Q3H PRN Deretha Cloud, PA-C   traZODone 50 mg Oral HS PRN Deretha Rockwall, PA-C   zolpidem 10 mg Oral HS [facility]       Risks, benefits and possible side effects of Medications:   Risks, benefits, and possible side effects of medications explained to patient and patient verbalizes understanding  Risks of medications in pregnancy explained if female patient  Patient verbalizes understanding and agrees to notify her doctor if she becomes pregnant  This note has been constructed using a voice recognition system  There may be translation, syntax,  or grammatical errors  If you have any questions, please contact the dictating provider

## 2019-03-02 PROBLEM — F31.62 BIPOLAR DISORDER, CURRENT EPISODE MIXED, MODERATE (HCC): Status: ACTIVE | Noted: 2019-02-28

## 2019-03-02 PROCEDURE — 99232 SBSQ HOSP IP/OBS MODERATE 35: CPT | Performed by: PSYCHIATRY & NEUROLOGY

## 2019-03-02 RX ORDER — FLUVOXAMINE MALEATE 50 MG/1
75 TABLET, COATED ORAL
Status: DISCONTINUED | OUTPATIENT
Start: 2019-03-02 | End: 2019-03-08 | Stop reason: HOSPADM

## 2019-03-02 RX ADMIN — LORAZEPAM 1 MG: 1 TABLET ORAL at 17:10

## 2019-03-02 RX ADMIN — FLUVOXAMINE MALEATE 75 MG: 50 TABLET, FILM COATED ORAL at 21:13

## 2019-03-02 RX ADMIN — PANTOPRAZOLE SODIUM 20 MG: 20 TABLET, DELAYED RELEASE ORAL at 17:10

## 2019-03-02 RX ADMIN — ALUMINUM HYDROXIDE, MAGNESIUM HYDROXIDE, AND SIMETHICONE 30 ML: 200; 200; 20 SUSPENSION ORAL at 15:03

## 2019-03-02 RX ADMIN — DIVALPROEX SODIUM 750 MG: 500 TABLET, DELAYED RELEASE ORAL at 17:10

## 2019-03-02 RX ADMIN — MELATONIN 3 MG: 3 TAB ORAL at 21:13

## 2019-03-02 RX ADMIN — LORAZEPAM 1 MG: 1 TABLET ORAL at 08:16

## 2019-03-02 RX ADMIN — DIVALPROEX SODIUM 500 MG: 500 TABLET, DELAYED RELEASE ORAL at 08:16

## 2019-03-02 RX ADMIN — PANTOPRAZOLE SODIUM 20 MG: 20 TABLET, DELAYED RELEASE ORAL at 08:16

## 2019-03-02 RX ADMIN — ZOLPIDEM TARTRATE 10 MG: 5 TABLET, FILM COATED ORAL at 21:13

## 2019-03-02 NOTE — PROGRESS NOTES
Patient appears to have slept most of the night without signs of restlessness waking up at [phone] approaching nurses station requesting someone to wake her up at 0600 so she can take a shower  Patient approached nurses station again at 0530 again, requesting to be woken up at 0600

## 2019-03-02 NOTE — PROGRESS NOTES
Pt pleasant during interaction  States that she has been working on coping skills while on the unit and has multiple items at bedside to use when she feels  "panicky" States that she feels like she is slowly improving and denies any side effects from medications  States that she slept well last night  Denies any SI/HI  Will continue to monitor

## 2019-03-02 NOTE — PROGRESS NOTES
Progress Note - Behavioral Health   Maria Elena Calvillo 41 y o  female MRN: [id number]  Unit/Bed#: [facility] 260-01 Encounter: [id number]    Assessment/Plan   Principal Problem:    Mood disorder ([address] )  Active Problems:    OCD (obsessive compulsive disorder)    Generalized anxiety disorder    Subjective:  Patient is compliant with medication with no acute side effects reported  Patient is showing slow improvement in labile affect and marked improvement in irritability noted  Patient continues to express having intrusive thoughts related to what she did in the past but will not elaborate on details as this resulted in worsening aggression for her  Patient agreed with plan of discussing these thoughts once her level of aggression and able to tolerate these feelings are better than today  Patient agreed with plan of increasing Luvox to 75 mg tonight and monitoring for slow improvement  Patient does feel slow improvement and is willing to work with primary team recommendation  She is consenting for safety on the unit and is seen out in milieu attending groups      Current Medications:    Current Facility-Administered Medications:  acetaminophen 325 mg Oral Q6H PRN Opal Stout PA-C   acetaminophen 650 mg Oral Q6H PRN Opal Stout PA-C   aluminum-magnesium hydroxide-simethicone 30 mL Oral Q4H PRN Marilee Barker MD   benztropine 1 mg Intramuscular Q6H PRN Marilee Barker MD   benztropine 1 mg Oral Q6H PRN Marilee Barker MD   divalproex sodium 500 mg Oral QAM Opal Stout PA-C   divalproex sodium 750 mg Oral After Amato Soup, ANISA   fluvoxaMINE 75 mg Oral [facility]   haloperidol 5 mg Oral Q6H PRN Opal Stout PA-C   haloperidol lactate 5 mg Intramuscular Q6H PRN Opal Stout PA-C   hydrOXYzine HCL 25 mg Oral Q6H PRN Marilee Barker MD   ibuprofen 800 mg Oral Q8H PRN Opal Stout PA-C   LORazepam 2 mg Intramuscular Q6H PRN Opal Stout PA-C   LORazepam 0 5 mg Oral Q8H PRN Irena Caceres, ANISA   LORazepam 1 mg Oral BID Irena Caceres, ANISA   magnesium hydroxide 30 mL Oral Daily PRN Rollene Crigler, MD   melatonin 3 mg Oral HS Tyler Nicolas   OLANZapine 10 mg Intramuscular Q8H PRN Irena Caceres, ANISA   OLANZapine 10 mg Oral Q8H PRN Irena Caceres, ANISA   pantoprazole 20 mg Oral BID Tyler Nicolas   risperiDONE 1 mg Oral Q3H PRN Irena Caceres, ANISA   traZODone 50 mg Oral HS PRN Irean Caceres, ANISA   zolpidem 10 mg Oral HS [address] [facility] Medications: all current active meds have been reviewed  Vital signs in last 24 hours:  Temp:  [97 7 °F (36 5 °C)-98 4 °F (36 9 °C)] 97 7 °F (36 5 °C)  HR:  [66-88] 88  Resp:  [16] 16  BP: (121-128)/(58-78) 128/78    Laboratory results:    I have personally reviewed all pertinent laboratory/tests results    Labs in last 72 hours:   Recent Labs     02/28/19  0007 03/01/19  0556   WBC  --  7 47   RBC  --  4 34   HGB  --  13 9   HCT  --  41 9   PLT  --  195   RDW  --  12 9   NEUTROABS  --  3 32   SODIUM  --  143   K  --  4 4   CL  --  106   CO2  --  29   BUN  --  11   CREATININE  --  0 75   GLUC  --  91   GLUF  --  91   CALCIUM  --  8 3   AST  --  7   ALT  --  18   ALKPHOS  --  58   TP  --  6 3*   ALB  --  3 5   TBILI  --  0 30   CHOLESTEROL  --  152   HDL  --  41   TRIG  --  78   LDLCALC  --  95   [id number] 2 573  --    PREGSERUM Negative  --    RPR Non-Reactive  --      Admission Labs:   Admission on 02/27/2019   Component Date Value    Preg, Serum 02/28/2019 Negative     Amph/Meth UR 02/27/2019 Negative     Barbiturate Ur 02/27/2019 Negative     Benzodiazepine Urine 02/27/2019 Negative     Cocaine Urine 02/27/2019 Negative     Methadone Urine 02/27/2019 Negative     Opiate Urine 02/27/2019 Negative     PCP Ur 02/27/2019 Negative     THC Urine 02/27/2019 Negative     RPR 02/28/2019 Non-Reactive     TSH 3RD GENERATON 02/28/2019 2 573     Color, UA 02/28/2019 Yellow     Clarity,  02/28/2019 Cloudy     Specific [place], UA 02/28/2019 1 020     pH, UA 02/28/2019 7 0     Leukocytes, UA 02/28/2019 Negative     Nitrite, UA 02/28/2019 Negative     Protein, UA 02/28/2019 Negative     Glucose, UA 02/28/2019 Negative     Ketones, UA 02/28/2019 Negative     Urobilinogen, UA 02/28/2019 0 2     Bilirubin, UA 02/28/2019 Negative     Blood, UA 02/28/2019 Negative     Hemoglobin A1C 03/01/2019 5 5     EAG 03/01/2019 111     WBC 03/01/2019 7 47     RBC 03/01/2019 4 34     Hemoglobin 03/01/2019 13 9     Hematocrit 03/01/2019 41 9     MCV 03/01/2019 97     MCH 03/01/2019 32 0     MCHC 03/01/2019 33 2     RDW 03/01/2019 12 9     MPV 03/01/2019 12 4     Platelets 91/94/1448 195     nRBC 03/01/2019 0     Neutrophils Relative 03/01/2019 44     Immat GRANS % 03/01/2019 0     Lymphocytes Relative 03/01/2019 42     Monocytes Relative 03/01/2019 10     Eosinophils Relative 03/01/2019 3     Basophils Relative 03/01/2019 1     Neutrophils Absolute 03/01/2019 3 32     Immature Grans Absolute 03/01/2019 0 02     Lymphocytes Absolute 03/01/2019 3 14     Monocytes Absolute 03/01/2019 0 71     Eosinophils Absolute 03/01/2019 0 24     Basophils Absolute 03/01/2019 0 04     Sodium 03/01/2019 143     Potassium 03/01/2019 4 4     Chloride 03/01/2019 106     CO2 03/01/2019 29     ANION GAP 03/01/2019 8     BUN 03/01/2019 11     Creatinine 03/01/2019 0 75     Glucose 03/01/2019 91     Glucose, Fasting 03/01/2019 91     Calcium 03/01/2019 8 3     AST 03/01/2019 7     ALT 03/01/2019 18     Alkaline Phosphatase 03/01/2019 58     Total Protein 03/01/2019 6 3*    Albumin 03/01/2019 3 5     Total Bilirubin 03/01/2019 0 30     eGFR 03/01/2019 102     Cholesterol 03/01/2019 152     Triglycerides 03/01/2019 78     HDL, Direct 03/01/2019 41     LDL Calculated 03/01/2019 95     Non-HDL-Chol (CHOL-HDL) 03/01/2019 111        Psychiatric Review of Systems:  Behavior over the last 24 hours:  Slow improvement  Sleep:  Slow improvement  Appetite: normal  Medication side effects: No  ROS: no complaints    Mental Status Evaluation:  Appearance:  casually dressed   Behavior:  guarded   Speech:  soft   Mood:  angry, anxious and depressed   Affect:  increased in range   Language naming objects and repeating phrases   Thought Process:  circumstantial   Thought Content:  obsessions   Perceptual Disturbances: None   Risk Potential: Suicidal Ideations without plan, Homicidal Ideations none and Potential for Aggression No   Sensorium:  person   Cognition:  grossly intact   Consciousness:  awake    Attention: attention span appeared shorter than expected for age   Insight:  limited   Judgment: limited   Intellect fair   Gait/Station: normal gait/station   Motor Activity: no abnormal movements     Memory: Short and long term memory  fair     Progress Toward Goals:  Progressing slowly    Recommended Treatment:   Depakote level on Monday morning  Increase Luvox to 75 mg at bedtime for depression and intrusive obsessive thinking  Continue with group therapy, milieu therapy and occupational therapy      Continue following current medications:   Current Facility-Administered Medications:  acetaminophen 325 mg Oral Q6H PRN Lele Gregory PA-C   acetaminophen 650 mg Oral Q6H PRN Lele Gregory PA-C   aluminum-magnesium hydroxide-simethicone 30 mL Oral Q4H PRN Hernan Healy MD   benztropine 1 mg Intramuscular Q6H PRN Hernan Healy MD   benztropine 1 mg Oral Q6H PRN Hernan Healy MD   divalproex sodium 500 mg Oral QAM Lele Gregory PA-C   divalproex sodium 750 mg Oral After Amato SoupANISA   fluvoxaMINE 75 mg Oral [facility]   haloperidol 5 mg Oral Q6H PRN Lele Gregory PA-C   haloperidol lactate 5 mg Intramuscular Q6H PRN Lele Gregory PA-C   hydrOXYzine HCL 25 mg Oral Q6H PRN Hernan Healy MD   ibuprofen 800 mg Oral Q8H PRN Lele Gregory PA-C   LORazepam 2 mg Intramuscular Q6H PRN Alisaa Book, PA-C   LORazepam 0 5 mg Oral Q8H PRN Alisaa Book, PA-C   LORazepam 1 mg Oral BID Anastacio Book, PA-C   magnesium hydroxide 30 mL Oral Daily PRN Jennifer Metzger MD   melatonin 3 mg Oral HS Tylerteresita Vlaenzuela   OLANZapine 10 mg Intramuscular Q8H PRN Alisaa Book, PA-C   OLANZapine 10 mg Oral Q8H PRN Anastacio Book, PA-C   pantoprazole 20 mg Oral BID Tylerteresita Valenzuela   risperiDONE 1 mg Oral Q3H PRN Anastacio Book, PA-C   traZODone 50 mg Oral HS PRN Anastacio Bar, PA-C   zolpidem 10 mg Oral HS Tyler Valenzuela       Risks, benefits and possible side effects of Medications:   Risks, benefits, and possible side effects of medications explained to patient and patient verbalizes understanding  Risks of medications in pregnancy explained if female patient  Patient verbalizes understanding and agrees to notify her doctor if she becomes pregnant  This note has been constructed using a voice recognition system  There may be translation, syntax,  or grammatical errors  If you have any questions, please contact the dictating provider

## 2019-03-03 PROCEDURE — 99232 SBSQ HOSP IP/OBS MODERATE 35: CPT | Performed by: PSYCHIATRY & NEUROLOGY

## 2019-03-03 RX ADMIN — DIVALPROEX SODIUM 750 MG: 500 TABLET, DELAYED RELEASE ORAL at 17:04

## 2019-03-03 RX ADMIN — ZOLPIDEM TARTRATE 10 MG: 5 TABLET, FILM COATED ORAL at 21:12

## 2019-03-03 RX ADMIN — LORAZEPAM 1 MG: 1 TABLET ORAL at 17:05

## 2019-03-03 RX ADMIN — MELATONIN 3 MG: 3 TAB ORAL at 21:12

## 2019-03-03 RX ADMIN — PANTOPRAZOLE SODIUM 20 MG: 20 TABLET, DELAYED RELEASE ORAL at 17:05

## 2019-03-03 RX ADMIN — LORAZEPAM 1 MG: 1 TABLET ORAL at 08:11

## 2019-03-03 RX ADMIN — DIVALPROEX SODIUM 500 MG: 500 TABLET, DELAYED RELEASE ORAL at 08:11

## 2019-03-03 RX ADMIN — FLUVOXAMINE MALEATE 75 MG: 50 TABLET, FILM COATED ORAL at 21:11

## 2019-03-03 RX ADMIN — PANTOPRAZOLE SODIUM 20 MG: 20 TABLET, DELAYED RELEASE ORAL at 08:11

## 2019-03-03 NOTE — PROGRESS NOTES
Progress Note - Behavioral Health   Alycia Marlow 41 y o  female MRN: [id number]  Unit/Bed#: [facility] 260-01 Encounter: [id number]    Assessment/Plan   Principal Problem:    Bipolar disorder, current episode mixed, moderate (HCC)  Active Problems:    OCD (obsessive compulsive disorder)    Generalized anxiety disorder    Subjective:  Patient is compliant with medications with no acute side effects  Patient reports improvement in mood, anxiety and improved sleep  Patient did smiled during evaluation which was pointed to patient and patient agreed with improvement in affect  I also removed details of for medication changes and go for this treatment  Patient reports her family will be visiting her today and she will ask them for signs of improvement also  Patient remained child-like but more easily redirectable and no signs of agitation  No signs of Depakote toxicity noted and patient agreed with plan of checking Depakote level tomorrow  Patient is seen out in milieu today attending groups      Current Medications:    Current Facility-Administered Medications:  acetaminophen 325 mg Oral Q6H PRN Lyndsey [place], PA-C   acetaminophen 650 mg Oral Q6H PRN Lyndsey [place], PA-C   aluminum-magnesium hydroxide-simethicone 30 mL Oral Q4H PRN Emanuel Mcclendon MD   benztropine 1 mg Intramuscular Q6H PRN Emanuel Mcclendon MD   benztropine 1 mg Oral Q6H PRN Emanuel Mcclendon MD   divalproex sodium 500 [facility]   divalproex sodium 750 mg Oral After Amato Soup, PA-C   fluvoxaMINE 75 mg Oral [facility]   haloperidol 5 mg Oral Q6H PRN Lyndsey Rashmi, PA-C   haloperidol lactate 5 mg Intramuscular Q6H PRN Lyndsey Rashmi, PA-C   hydrOXYzine HCL 25 mg Oral Q6H PRN Emanuel Mcclendon MD   ibuprofen 800 mg Oral Q8H PRN Lyndsey [place], PA-C   LORazepam 2 mg Intramuscular Q6H PRN Lyndsey [place], PA-C   LORazepam 0 5 mg Oral Q8H PRN Lyndsey [place], PA-C   LORazepam 1 mg Oral BID Halle Median ANISA Rich   magnesium hydroxide 30 mL Oral Daily PRN Iverson Saint, MD   melatonin 3 mg Oral HS Tyler Valenzuela   OLANZapine 10 mg Intramuscular Q8H PRN Abbey Antoine PA-C   OLANZapine 10 mg Oral Q8H PRN Abbey Antoine PA-C   pantoprazole 20 mg Oral BID Tyler Valenzuela   risperiDONE 1 mg Oral Q3H PRN Abbey Antoine PA-C   traZODone 50 mg Oral HS PRN Abbey Antoine PA-C   zolpidem 10 mg Oral HS [address] Medications: all current active meds have been reviewed  Vital signs in last 24 hours:  Temp:  [97 7 °F (36 5 °C)-98 2 °F (36 8 °C)] 97 7 °F (36 5 °C)  HR:  [80-83] 83  Resp:  [17-18] 17  BP: (130-149)/(64-88) 136/64    Laboratory results:    I have personally reviewed all pertinent laboratory/tests results    Labs in last 72 hours:   Recent Labs     03/01/19  0556   WBC 7 47   RBC 4 34   HGB 13 9   HCT 41 9      RDW 12 9   NEUTROABS 3 32   SODIUM 143   K 4 4      CO2 29   BUN 11   CREATININE 0 75   GLUC 91   GLUF 91   CALCIUM 8 3   AST 7   ALT 18   ALKPHOS 58   TP 6 3*   ALB 3 5   TBILI 0 30   CHOLESTEROL 152   HDL 41   TRIG 78   LDLCALC 95     Admission Labs:   Admission on 02/27/2019   Component Date Value    Preg, Serum 02/28/2019 Negative     Amph/Meth UR 02/27/2019 Negative     Barbiturate Ur 02/27/2019 Negative     Benzodiazepine Urine 02/27/2019 Negative     Cocaine Urine 02/27/2019 Negative     Methadone Urine 02/27/2019 Negative     Opiate Urine 02/27/2019 Negative     PCP Ur 02/27/2019 Negative     THC Urine 02/27/2019 Negative     RPR 02/28/2019 Non-Reactive     TSH 3RD GENERATON 02/28/2019 2 573     Color, UA 02/28/2019 Yellow     Clarity, UA 02/28/2019 Cloudy     Specific Gravity, UA 02/28/2019 1 020     pH, UA 02/28/2019 7 0     Leukocytes, UA 02/28/2019 Negative     Nitrite, UA 02/28/2019 Negative     Protein, UA 02/28/2019 Negative     Glucose, UA 02/28/2019 Negative     Ketones, UA 02/28/2019 Negative     Urobilinogen, UA 02/28/2019 0 2     Bilirubin, UA 02/28/2019 Negative     Blood, UA 02/28/2019 Negative     Hemoglobin A1C 03/01/2019 5 5     EAG 03/01/2019 111     WBC 03/01/2019 7 47     RBC 03/01/2019 4 34     Hemoglobin 03/01/2019 13 9     Hematocrit 03/01/2019 41 9     MCV 03/01/2019 97     MCH 03/01/2019 32 0     MCHC 03/01/2019 33 2     RDW 03/01/2019 12 9     MPV 03/01/2019 12 4     Platelets 13/34/1225 195     nRBC 03/01/2019 0     Neutrophils Relative 03/01/2019 44     Immat GRANS % 03/01/2019 0     Lymphocytes Relative 03/01/2019 42     Monocytes Relative 03/01/2019 10     Eosinophils Relative 03/01/2019 3     Basophils Relative 03/01/2019 1     Neutrophils Absolute 03/01/2019 3 32     Immature Grans Absolute 03/01/2019 0 02     Lymphocytes Absolute 03/01/2019 3 14     Monocytes Absolute 03/01/2019 0 71     Eosinophils Absolute 03/01/2019 0 24     Basophils Absolute 03/01/2019 0 04     Sodium 03/01/2019 143     Potassium 03/01/2019 4 4     Chloride 03/01/2019 106     CO2 03/01/2019 29     ANION GAP 03/01/2019 8     BUN 03/01/2019 11     Creatinine 03/01/2019 0 75     Glucose 03/01/2019 91     Glucose, Fasting 03/01/2019 91     Calcium 03/01/2019 8 3     AST 03/01/2019 7     ALT 03/01/2019 18     Alkaline Phosphatase 03/01/2019 58     Total Protein 03/01/2019 6 3*    Albumin 03/01/2019 3 5     Total Bilirubin 03/01/2019 0 30     eGFR 03/01/2019 102     Cholesterol 03/01/2019 152     Triglycerides 03/01/2019 78     HDL, Direct 03/01/2019 41     LDL Calculated 03/01/2019 95     Non-HDL-Chol (CHOL-HDL) 03/01/2019 111        Psychiatric Review of Systems:  Behavior over the last 24 hours:  Improving slowly  Sleep: normal  Appetite: normal  Medication side effects: No  ROS: no complaints    Mental Status Evaluation:  Appearance:  casually dressed   Behavior:  guarded   Speech:  soft   Mood:  anxious and depressed   Affect:  increased in range   Language naming objects and repeating phrases   Thought Process:  circumstantial   Thought Content:  obsessions   Perceptual Disturbances: None   Risk Potential: Suicidal Ideations without plan, Homicidal Ideations none and Potential for Aggression No   Sensorium:  person and place   Cognition:  grossly intact   Consciousness:  awake    Attention: attention span appeared shorter than expected for age   Insight:  limited   Judgment: limited   Intellect fair   Gait/Station: normal gait/station   Motor Activity: no abnormal movements     Memory: Short and long term memory  fair     Progress Toward Goals: slow progress    Recommended Treatment:   Check Depakote level tomorrow morning and plan titration accordingly  Continue current medication and await for medication response before further titration  Continue with group therapy, milieu therapy and occupational therapy      Continue following current medications:   Current Facility-Administered Medications:  acetaminophen 325 mg Oral Q6H PRN Therjra FRANCIS Garcia-FABIOLA   acetaminophen 650 mg Oral Q6H PRN Deep Garcia, ANISA   aluminum-magnesium hydroxide-simethicone 30 mL Oral Q4H PRN Tyron Hartman MD   benztropine 1 mg Intramuscular Q6H PRN Tyron Hartman MD   benztropine 1 mg Oral Q6H PRN Tyron Hartman MD   divalproex sodium 500 mg Oral QAM Thermariaelena Garcia, PA-C   divalproex sodium 750 mg Oral After Amato Soup, PA-C   fluvoxaMINE 75 mg Oral HS [facility]   haloperidol 5 mg Oral Q6H PRN Thersia Jose, PA-C   haloperidol lactate 5 mg Intramuscular Q6H PRN Thersia Jose, PA-C   hydrOXYzine HCL 25 mg Oral Q6H PRN Tyron Hartman MD   ibuprofen 800 mg Oral Q8H PRN Thersia Jose, PA-C   LORazepam 2 mg Intramuscular Q6H PRN Thersia Jose, PA-C   LORazepam 0 5 mg Oral Q8H PRN Thersia Jose, PA-C   LORazepam 1 mg Oral BID Thermariaelena Garcia, ANISA   magnesium hydroxide 30 mL Oral Daily PRN Tyron Hartman MD   melatonin 3 mg Oral HS [facility]   OLANZapine 10 mg Intramuscular Q8H PRN Rollo Zhao, PA-C   OLANZapine 10 mg Oral Q8H PRN Rollo Zhao, PA-C   pantoprazole 20 mg Oral BID Tyler Valenzuela   risperiDONE 1 mg Oral Q3H PRN Rollo Zhao, PA-C   traZODone 50 mg Oral HS PRN Rollo Zhao, PA-C   zolpidem 10 mg Oral HS Tyler Valenzuela       Risks, benefits and possible side effects of Medications:   Risks, benefits, and possible side effects of medications explained to patient and patient verbalizes understanding  Risks of medications in pregnancy explained if female patient  Patient verbalizes understanding and agrees to notify her doctor if she becomes pregnant  This note has been constructed using a voice recognition system  There may be translation, syntax,  or grammatical errors  If you have any questions, please contact the dictating provider

## 2019-03-03 NOTE — PROGRESS NOTES
Pt reports that she feels like she is in a slump and feels a little down today but that she is trying not to, reports feeling bored on the unit  Reports that she had a good visit with her parents in which she smiled  Pt reports she is trying not to think anxious thoughts  Reports feeling a little drowsy and is resting in her room after dinner  Pt reports that she urinates frequently and is concerned she may have an overactive bladder, reports hx of spastic bladder  At end of shift pt reports she has not had any perseverative thoughts today and that her anxiety is better but that she still feels down, was reassured that some symptoms can improve before others but that she is improving overall

## 2019-03-03 NOTE — PROGRESS NOTES
Pt reports feeling anxious at start of shift because her blood pressure was high, blood pressure was taken manually which reassured pt  Pt discussed feeling anxious due to "thinking too much about stupid things" like whether she has enough clothes  Pt was asked if she is having thoughts to hurt herself and said "I don't think so," pt added, "for a long time I was saying yes but I didn't " Pt verbally contracted for safety  Pt asked to speak to this nurse two more times and stated that she had said she had thoughts to hurt others before but she did not even know them, pt asked why she would say those things and why she would lie  Pt became increasingly agitated and stated that she was not "staying in the now " Pt was then told that she did not need to figure everything out right now and was encouraged to use coping skills to ground herself, pt then called her mother and was able to calm down   Out in milieu for rest of shift, reports she is "hanging in there "

## 2019-03-04 LAB
ALBUMIN SERPL BCP-MCNC: 3.8 G/DL (ref 3.5–5)
ALP SERPL-CCNC: 53 U/L (ref 46–116)
ALT SERPL W P-5'-P-CCNC: 17 U/L (ref 12–78)
ANION GAP SERPL CALCULATED.3IONS-SCNC: 12 MMOL/L (ref 4–13)
AST SERPL W P-5'-P-CCNC: 6 U/L (ref 5–45)
BILIRUB SERPL-MCNC: 0.4 MG/DL (ref 0.2–1)
BUN SERPL-MCNC: 9 MG/DL (ref 5–25)
CALCIUM SERPL-MCNC: 8.9 MG/DL (ref 8.3–10.1)
CHLORIDE SERPL-SCNC: 105 MMOL/L (ref 100–108)
CO2 SERPL-SCNC: 25 MMOL/L (ref 21–32)
CREAT SERPL-MCNC: 0.77 MG/DL (ref 0.6–1.3)
GFR SERPL CREATININE-BSD FRML MDRD: 99 ML/MIN/1.73SQ M
GLUCOSE P FAST SERPL-MCNC: 100 MG/DL (ref 65–99)
GLUCOSE SERPL-MCNC: 100 MG/DL (ref 65–140)
POTASSIUM SERPL-SCNC: 4.1 MMOL/L (ref 3.5–5.3)
PROT SERPL-MCNC: 6.8 G/DL (ref 6.4–8.2)
SODIUM SERPL-SCNC: 142 MMOL/L (ref 136–145)
VALPROATE SERPL-MCNC: 57 UG/ML (ref 50–100)

## 2019-03-04 PROCEDURE — 80053 COMPREHEN METABOLIC PANEL: CPT | Performed by: PSYCHIATRY & NEUROLOGY

## 2019-03-04 PROCEDURE — 80164 ASSAY DIPROPYLACETIC ACD TOT: CPT | Performed by: PSYCHIATRY & NEUROLOGY

## 2019-03-04 PROCEDURE — 99232 SBSQ HOSP IP/OBS MODERATE 35: CPT | Performed by: PSYCHIATRY & NEUROLOGY

## 2019-03-04 PROCEDURE — 99239 HOSP IP/OBS DSCHRG MGMT >30: CPT | Performed by: PSYCHIATRY & NEUROLOGY

## 2019-03-04 RX ADMIN — FLUVOXAMINE MALEATE 75 MG: 50 TABLET, FILM COATED ORAL at 21:09

## 2019-03-04 RX ADMIN — ZOLPIDEM TARTRATE 10 MG: 5 TABLET, FILM COATED ORAL at 21:10

## 2019-03-04 RX ADMIN — PANTOPRAZOLE SODIUM 20 MG: 20 TABLET, DELAYED RELEASE ORAL at 08:05

## 2019-03-04 RX ADMIN — DIVALPROEX SODIUM 500 MG: 500 TABLET, DELAYED RELEASE ORAL at 08:05

## 2019-03-04 RX ADMIN — PANTOPRAZOLE SODIUM 20 MG: 20 TABLET, DELAYED RELEASE ORAL at 17:03

## 2019-03-04 RX ADMIN — DIVALPROEX SODIUM 750 MG: 500 TABLET, DELAYED RELEASE ORAL at 17:03

## 2019-03-04 RX ADMIN — LORAZEPAM 1 MG: 1 TABLET ORAL at 17:03

## 2019-03-04 RX ADMIN — MELATONIN 3 MG: 3 TAB ORAL at 21:10

## 2019-03-04 RX ADMIN — LORAZEPAM 1 MG: 1 TABLET ORAL at 08:05

## 2019-03-04 NOTE — CASE MANAGEMENT
CM contacted [facility] crisis residence @ [phone] & spoke with Bernard Soni who reported that they do have openings currently, & CM would need to fax clinical to [phone] Attn: Larence Nissen  CM returned a phone call to [address] [place] [address] @ [phone] & left a message for [address] [address]; she had left a message she could schedule Pt for 3/14  CM contacted Pt's mother, Collette Trinidad, @ [phone] who reported that she had serious concerns regarding the fact that less than 1 week ago Pt was stating she was suicidal, & now discharge is planned for Weds  Yvrose said that she wanted to know how Pt would be discharged with all the medication changes done in the past 5 days, & what behaviors were being tracked that indicated she was ready  Yvrose said she was concerned about Pt coming back to the home & decompensating right away  CM reviewed no medication changes in the past 2 days, & the ones prior to that had been dosage increases  CM reviewed Depakote level being therapeutic at this time & no further medication changes indicated/planned  ESTELLA spoke with Yvrose about stepdown to crisis residence & she felt that would be a great plan for Pt  ESTELLA reviewed that she would discuss this with Pt & if she agreed, fax clinical   Collette Havers asked that she be contacted tomorrow between 12:30 & 2 PM; ESTELLA agreed  Yvrose asked if she could talk to Pt before CM, to review with her the crisis residence opportunity & CM transferred her to the nurses station to talk to Pt

## 2019-03-04 NOTE — PROGRESS NOTES
Progress Note - Behavioral Health   Virl Sensor 41 y o  female MRN: [id number]  Unit/Bed#: [facility] 260-01 Encounter: [id number]    Assessment/Plan   Principal Problem:    Bipolar disorder, current episode mixed, moderate (HCC)  Active Problems:    OCD (obsessive compulsive disorder)    Generalized anxiety disorder    Subjective:  Patient is compliant with medications with no acute side effects  Patient reports slow improvement in mood and anxiety and improvement in sleep with reduction in irritability, intrusive thoughts and reduction in passive death wishes  Patient do have slow improvement in affect noted  Agreed with plan of collaborating with family and planning disposition accordingly  Patient appears less irritable and more easily redirectable  Patient agreed with plan of not increasing medication dosing further and giving her consistent dosing and planning disposition accordingly      Current Medications:    Current Facility-Administered Medications:  acetaminophen 325 mg Oral Q6H PRN Karla Dobbs PA-C   acetaminophen 650 mg Oral Q6H PRN Karla Dobbs PA-C   aluminum-magnesium hydroxide-simethicone 30 mL Oral Q4H PRN Troy Chicas MD   benztropine 1 mg Intramuscular Q6H PRN Troy Chicas MD   benztropine 1 mg Oral Q6H PRN Troy Chicas MD   divalproex sodium 500 mg Oral QAM Karla Dobbs PA-C   divalproex sodium 750 mg Oral After Amato SoupANISA   fluvoxaMINE 75 mg Oral HS Tyler Valenzuela   haloperidol 5 mg Oral Q6H PRN Karla Dobbs PA-C   haloperidol lactate 5 mg Intramuscular Q6H PRN Karla Dobbs PA-C   hydrOXYzine HCL 25 mg Oral Q6H PRN Troy Chicas MD   ibuprofen 800 mg Oral Q8H PRN Karla Dobbs PA-C   LORazepam 2 mg Intramuscular Q6H PRN Karla Dobbs PA-C   LORazepam 0 5 mg Oral Q8H PRN Karla Dobbs PA-C   LORazepam 1 mg Oral BID Karla Dobbs PA-C   magnesium hydroxide 30 mL Oral Daily PRN Troy Chicas MD   melatonin 3 mg Oral HS Tyler Nicolas   OLANZapine 10 mg Intramuscular Q8H PRN Rollo Zhao, PA-C   OLANZapine 10 mg Oral Q8H PRN Rollo Zhao, PA-C   pantoprazole 20 mg Oral BID Tyler Valenzuela   risperiDONE 1 mg Oral Q3H PRN Rollo Zhao, PA-C   traZODone 50 mg Oral HS PRN Rollo Zhao, PA-C   zolpidem 10 mg Oral HS [address] Medications: all current active meds have been reviewed  Vital signs in last 24 hours:  Temp:  [96 9 °F (36 1 °C)-98 9 °F (37 2 °C)] 97 6 °F (36 4 °C)  HR:  [62-85] 62  Resp:  [18-20] 20  BP: (118-129)/(62-70) 118/62    Laboratory results:    I have personally reviewed all pertinent laboratory/tests results    Labs in last 72 hours:   Recent Labs     03/04/19  0603   SODIUM 142   K 4 1      CO2 25   BUN 9   CREATININE 0 77   GLUC 100   GLUF 100*   CALCIUM 8 9   AST 6   ALT 17   ALKPHOS 53   TP 6 8   ALB 3 8   TBILI 0 40   VALPROICTOT 57     Admission Labs:   Admission on 02/27/2019   Component Date Value    Preg, Serum 02/28/2019 Negative     Amph/Meth UR 02/27/2019 Negative     Barbiturate Ur 02/27/2019 Negative     Benzodiazepine Urine 02/27/2019 Negative     Cocaine Urine 02/27/2019 Negative     Methadone Urine 02/27/2019 Negative     Opiate Urine 02/27/2019 Negative     PCP Ur 02/27/2019 Negative     THC Urine 02/27/2019 Negative     RPR 02/28/2019 Non-Reactive     TSH 3RD GENERATON 02/28/2019 2 573     Color, UA 02/28/2019 Yellow     Clarity, UA 02/28/2019 Cloudy     Specific Gravity, UA 02/28/2019 1 020     pH, UA 02/28/2019 7 0     Leukocytes, UA 02/28/2019 Negative     Nitrite, UA 02/28/2019 Negative     Protein, UA 02/28/2019 Negative     Glucose, UA 02/28/2019 Negative     Ketones, UA 02/28/2019 Negative     Urobilinogen, UA 02/28/2019 0 2     Bilirubin, UA 02/28/2019 Negative     Blood, UA 02/28/2019 Negative     Hemoglobin A1C 03/01/2019 5 5     EAG 03/01/2019 111     WBC 03/01/2019 7 47     RBC 03/01/2019 4 34     Hemoglobin 03/01/2019 13 9     Hematocrit 03/01/2019 41 9     MCV 03/01/2019 97     MCH 03/01/2019 32 0     MCHC 03/01/2019 33 2     RDW 03/01/2019 12 9     MPV 03/01/2019 12 4     Platelets 77/41/3910 195     nRBC 03/01/2019 0     Neutrophils Relative 03/01/2019 44     Immat GRANS % 03/01/2019 0     Lymphocytes Relative 03/01/2019 42     Monocytes Relative 03/01/2019 10     Eosinophils Relative 03/01/2019 3     Basophils Relative 03/01/2019 1     Neutrophils Absolute 03/01/2019 3 32     Immature Grans Absolute 03/01/2019 0 02     Lymphocytes Absolute 03/01/2019 3 14     Monocytes Absolute 03/01/2019 0 71     Eosinophils Absolute 03/01/2019 0 24     Basophils Absolute 03/01/2019 0 04     Sodium 03/01/2019 143     Potassium 03/01/2019 4 4     Chloride 03/01/2019 106     CO2 03/01/2019 29     ANION GAP 03/01/2019 8     BUN 03/01/2019 11     Creatinine 03/01/2019 0 75     Glucose 03/01/2019 91     Glucose, Fasting 03/01/2019 91     Calcium 03/01/2019 8 3     AST 03/01/2019 7     ALT 03/01/2019 18     Alkaline Phosphatase 03/01/2019 58     Total Protein 03/01/2019 6 3*    Albumin 03/01/2019 3 5     Total Bilirubin 03/01/2019 0 30     eGFR 03/01/2019 102     Cholesterol 03/01/2019 152     Triglycerides 03/01/2019 78     HDL, Direct 03/01/2019 41     LDL Calculated 03/01/2019 95     Non-HDL-Chol (CHOL-HDL) 03/01/2019 111     Valproic Acid, Total 03/04/2019 57     Sodium 03/04/2019 142     Potassium 03/04/2019 4 1     Chloride 03/04/2019 105     CO2 03/04/2019 25     ANION GAP 03/04/2019 12     BUN 03/04/2019 9     Creatinine 03/04/2019 0 77     Glucose 03/04/2019 100     Glucose, Fasting 03/04/2019 100*    Calcium 03/04/2019 8 9     AST 03/04/2019 6     ALT 03/04/2019 17     Alkaline Phosphatase 03/04/2019 53     Total Protein 03/04/2019 6 8     Albumin 03/04/2019 3 8     Total Bilirubin 03/04/2019 0 40     eGFR 03/04/2019 99        Psychiatric Review of Systems:  Behavior over the last 24 hours:  Slow improvement  Sleep: normal  Appetite: normal  Medication side effects: No  ROS: no complaints    Mental Status Evaluation:  Appearance:  casually dressed   Behavior:  guarded   Speech:  soft   Mood:  anxious and depressed   Affect:  constricted   Language naming objects   Thought Process:  circumstantial   Thought Content:  obsessions   Perceptual Disturbances: None   Risk Potential: Suicidal Ideations none, Homicidal Ideations none and Potential for Aggression No   Sensorium:  person, place and time/date   Cognition:  grossly intact   Consciousness:  awake    Attention: attention span appeared shorter than expected for age   Insight:  limited   Judgment: limited   Intellect fair   Gait/Station: normal gait/station   Motor Activity: no abnormal movements     Memory: Short and long term memory  fair     Progress Toward Goals: slow progress    Recommended Treatment:   Continue with group therapy, milieu therapy and occupational therapy      Continue following current medications:   Current Facility-Administered Medications:  acetaminophen 325 mg Oral Q6H PRN Jestine Mungo, PA-C   acetaminophen 650 mg Oral Q6H PRN Jestine Mungo, PA-C   aluminum-magnesium hydroxide-simethicone 30 mL Oral Q4H PRN Elicia Odom, MD   benztropine 1 mg Intramuscular Q6H PRN Elicia Button, MD   benztropine 1 mg Oral Q6H PRN Elicia Button, MD   divalproex sodium 500 mg Oral QAM Jestine Mungo, PA-C   divalproex sodium 750 mg Oral After Amato Soup, PA-C   fluvoxaMINE 75 mg Oral [facility]   haloperidol 5 mg Oral Q6H PRN Jestine Mungo, PA-C   haloperidol lactate 5 mg Intramuscular Q6H PRN Jestine Mungo, PA-C   hydrOXYzine HCL 25 mg Oral Q6H PRN Elicia Button, MD   ibuprofen 800 mg Oral Q8H PRN Jestine Mungo, PA-C   LORazepam 2 mg Intramuscular Q6H PRN Jestine Mungo, PA-C   LORazepam 0 5 mg Oral Q8H PRN Jestine Mungo, PA-C   LORazepam 1 mg Oral BID Bernarda Gee PA-C   magnesium hydroxide 30 mL Oral Daily PRN Trino Wilde MD   melatonin 3 mg Oral HS Tyler Valenzuela   OLANZapine 10 mg Intramuscular Q8H PRN Bernarda Gee PA-C   OLANZapine 10 mg Oral Q8H PRN Bernarda Gee PA-C   pantoprazole 20 mg Oral BID Tyler Valenzuela   risperiDONE 1 mg Oral Q3H PRN Bernarda Gee PA-C   traZODone 50 mg Oral HS PRN Bernarda Gee PA-C   zolpidem 10 mg Oral HS Tyler Valenzuela       Risks, benefits and possible side effects of Medications:   Risks, benefits, and possible side effects of medications explained to patient and patient verbalizes understanding  Risks of medications in pregnancy explained if female patient  Patient verbalizes understanding and agrees to notify her doctor if she becomes pregnant  This note has been constructed using a voice recognition system  There may be translation, syntax,  or grammatical errors  If you have any questions, please contact the dictating provider

## 2019-03-04 NOTE — DISCHARGE SUMMARY
"  Inpatient Psychiatry Discharge Summary    BRIEF OVERVIEW  Admitting Provider: Ga Horne MD  Discharge Provider: No att. providers found  Primary Care Physician at Discharge: Unable, To Obtain Pcp None Transfer to [facility] psychiatric unit    Admission Date: 2/8/2019     Discharge Date: 3/4/2019    Discharge Diagnosis  Mood disorder, other, autism spectrum disorder, OCD, borderline personality disorder, dependant personality disorder.    Discharge Disposition  [facility] - Other    Discharge Medications     Medication List      START taking these medications    QUEtiapine 100 mg tablet  Commonly known as:  SEROquel  Take 1 tablet (100 mg total) by mouth nightly.     risperiDONE 3 mg tablet  Commonly known as:  RisperDAL  Take 0.5 tablets (1.5 mg total) by mouth 2 (two) times a day.     venlafaxine XR 37.5 mg 24 hr capsule  Commonly known as:  EFFEXOR-XR  Take 1 capsule (37.5 mg total) by mouth daily with breakfast.        CONTINUE taking these medications    carBAMazepine 200 mg tablet  Commonly known as:  TEGretol  Take 1 tablet (200 mg total) by mouth 3 (three) times a day.     loratadine 10 mg tablet  Commonly known as:  CLARITIN  Take 10 mg by mouth daily.     ranitidine 150 mg tablet  Commonly known as:  ZANTAC  Take 300 mg by mouth 2 (two) times a day. AM and HS     VESICARE 5 mg tablet  Generic drug:  solifenacin  Take 1 tablet by mouth daily.              Reason for discharging the patient on multiple antipsychotic medications: plan to taper monotherapy or cross taper in progress    Outpatient Follow-Up  Encounter Information     You do not currently have any appointments scheduled.          Test Results Pending at Discharge  [unfilled]      DETAILS OF HOSPITAL STAY    Presenting Problem/History of Present Illness  Worsening obsessive perseverative thoughts about death and dying.    Hospital Course  Patient is admitted to the unit.  The patient says \"I feel like I am doing all " "this for attention and just making this stuff up.\"  She admits to being fixated words such as \"kill\" or \"dying\" or \"dead\" or \"choke.\"  She reiterates that she would not act on these thoughts or worries but that they are distressing to her.  She is continued on Tegretol 200 mg 3 times a day plus Seroquel 50 mg at night and Risperdal 0.5 mg twice daily With an additional 0.5 mg 3 times as needed for agitation.  Effexor is held out of concern for activation, which this medicine caused in this patient previously.  Initially, the patient admits that groups help her to focus off of worry and overwhelming thoughts.  She admits that Risperdal helps her when she feels overwhelmed.  She easily tends to perseverate on her anxiety and uncomfortable thoughts, verging on tears when she is asked how she is doing, but otherwise attending groups and participating in art therapy.  The patient is encouraged to do art projects if she feels anxious and to use guided meditation.  The patient's parents are updated the number of times during her hospitalization.  Within 1 week, the patient talks about wanting to go to another facility \"because I am not getting better.\"  She repeatedly asks \"why am I having these thoughts?\"  She acknowledges that she believes she would not harm herself but is upset that she perseverates on negative and fear inducing thoughts.  She has difficulty practicing mindfulness.  Her doctor assignment is changed several times due to vacations and absences, and this appears to affect the patient, and that she is unhappy that she does not have the same doctor.   She requires a great deal of reassurance and attention from staff, frequently coming up tearful to staff and asking \"why am I having these thoughts?\"   She generally remains negativistic and obsessive about negative thinking.  We practiced guided meditation, counting with each breath starting from 100 and going down.  The patient appears calm composed and quiet " "and she is fully able to engage in this meditation exercise.  She acknowledges feeling \"a little bit calmer\" at the end of the exercise.  The patient is encouraged to practice this regularly.  The patient again stated on 2/20 that she wanted to be transferred to another facility, possibly [facility].   I spoke with the patient's parents.  They were appreciative of our discussion and I reiterated to them that we saw an improvement last time she was here, and that this recovery will take some time.  The parents ultimately were agreeable to encourage Ndaine to agree to stay for at least the next few days, to let us try to help her.  She admitted to having less intrusive thoughts and was less perseverative about death.  On 2/22, she is somewhat less perseverative on negative thoughts, but still gravitates towards them until she is actively steered away from obsessive thoughts of death.  She denies any intent to act on these thoughts and they appear to be strongly ego dystonic in nature.  Effexor is increased to 75mg, and patient appears more labile and emotional and tearful.  She begins to say that she feels \"like I am a murder-I do not actually think I mean it but I am always in my head… I just want to be happy.\"  She states more definitively that she wishes to be transferred to another hospital as she feels she is not getting better.  On February 27, the patient is transferred to [facility] psychiatric unit for continued care.  On the day of discharge, she acknowledges continuing to have thoughts about death and dying, although she does not believe she would act on these.  She is emotionally labile, tearful and negativistic.  Her parents are supportive of a transfer although they tell the team several times that they appreciate the help that she has been given and know that she has gotten better before here but believe \"we have to take a different direction.\"    Consults:   Orders Placed This Encounter "   Procedures   • Inpatient consult to Hospitalist       Procedures: None    Mental Status Exam at Discharge  Heart Rate: 86  Resp: 18  BP: 128/75  Temp: 36.9 °C (98.5 °F)  Weight: 90.6 kg (199 lb 12.8 oz)    Mental Status Exam:  Appearance: appropriate attire  Gait and Motor: Regular  Speech: normal rate/rhythm/volume  Mood: irritable  Affect: restricted and irritable  Associations: coherent  Thought Process: perseveration and obsessive  Thought Content: obsessions and misperceptions  Suicidality/Homicidality: thoughts of being dead/ no desire or plan to die  Judgement/Insight: makes choices that perpetuate illness  Orientation: year, season, type of place, name of place, city and situation  Memory: recalls recent events and recalls remote events  Attention: withdrawn

## 2019-03-04 NOTE — PROGRESS NOTES
Pt calm and reports feeling "a little down", appears slightly anxious  Pt denies any anxiety at this time, stating that she is using the coping skills she has learned  Writer praised pt for using coping skills encouraged continued use  Pt denies SI/HI/AVH and states she is "happy to be going home soon"  Observed in day room and dining room conversing with a peer

## 2019-03-04 NOTE — CASE MANAGEMENT
CM received a call from [facility] , who reported that she did speak with Pt this morning, & she did sound better  Kelli Shaffer said that Pt told her that she is being discharge on Weds, & she wanted to confirm if this was true  CM reviewed that discharge is tentative for Weds/Thurs, however, CM would call to confirm tomorrow  CM asked how often Pt is seen by Kelli Shaffer, & she said that she sees Pt for 7 hours each week, & she would like to come in for a discharge planning meeting, however, her schedule is booked for this week  Kelli Shaffer said that Pt's mom feels that Weds is too soon of a discharge for Pt, & CM said that she planned to contact Pt's mom today & will talk to her & Pt about possible stepdown to crisis residence, if they feel this is too soon  Kelli Shaffer provided direct contact number of [phone] & fax number [phone] Attn: Archibald Mohs Ferrell Genin said that she told Pt she would probably visit with her on the weekend

## 2019-03-05 PROCEDURE — 99232 SBSQ HOSP IP/OBS MODERATE 35: CPT | Performed by: PSYCHIATRY & NEUROLOGY

## 2019-03-05 RX ADMIN — FLUVOXAMINE MALEATE 75 MG: 50 TABLET, FILM COATED ORAL at 21:03

## 2019-03-05 RX ADMIN — MELATONIN 3 MG: 3 TAB ORAL at 21:04

## 2019-03-05 RX ADMIN — MAGNESIUM HYDROXIDE 30 ML: 400 SUSPENSION ORAL at 09:22

## 2019-03-05 RX ADMIN — LORAZEPAM 1 MG: 1 TABLET ORAL at 17:18

## 2019-03-05 RX ADMIN — DIVALPROEX SODIUM 500 MG: 500 TABLET, DELAYED RELEASE ORAL at 08:28

## 2019-03-05 RX ADMIN — LORAZEPAM 1 MG: 1 TABLET ORAL at 08:28

## 2019-03-05 RX ADMIN — ZOLPIDEM TARTRATE 10 MG: 5 TABLET, FILM COATED ORAL at 21:03

## 2019-03-05 RX ADMIN — HYDROXYZINE HYDROCHLORIDE 25 MG: 25 TABLET, FILM COATED ORAL at 12:15

## 2019-03-05 RX ADMIN — DIVALPROEX SODIUM 750 MG: 500 TABLET, DELAYED RELEASE ORAL at 17:18

## 2019-03-05 RX ADMIN — PANTOPRAZOLE SODIUM 20 MG: 20 TABLET, DELAYED RELEASE ORAL at 17:18

## 2019-03-05 RX ADMIN — PANTOPRAZOLE SODIUM 20 MG: 20 TABLET, DELAYED RELEASE ORAL at 08:29

## 2019-03-05 NOTE — PROGRESS NOTES
Pharmacy Medication Education Group     Patient wanted to know "how do I know if my medication works " With permission looked at patient profile and told her she was taking Luvox which is not only an antidepressant but also approved for obsessive compulsive disorder  Pt  Responded "is that why I am not perseverating as much as I used to, I just feel I have less thoughts in my head" Pt  Was counseled on Luvox and also told that this medication can help with that but that it may take time to see full effects even up to 6 weeks and higher doses as well as Luvox can go as high as 300mg for example with people with severe OCD  The patient appeared depressed, and constricted and walked out of group after about 20 minutes  The patient was behaviorally and emotionally appropriate for group but appeared depressed with constricted affect       Carly Forbes, PharmD, Clinical Pharmacist - Psychiatry

## 2019-03-05 NOTE — CASE MANAGEMENT
CM faxed clinical information to [address] [place] [address] @ [phone]  CM contacted Pt's mom, Keyur Nath, @ [phone], who reported concerns after this visit last night & that they feel Pt is regressing  Yvrose said that when they visited last night, Pt cried for the first time since Thursday(1/28), & was very depressed  Pt talked about the death conversation, & had that "deer in the headlight look" & told them not to visit norma Frances said that they offered for other family members to visit, whom Pt is close with, however,she didn't want to see anybody  Yvrose called Pt this morning, & she reluctantly did agreed to see her aunt norma  Yvrose said that due to Pt's time hospitalized, her community support through the Autism waiver, had to find other hours, so now she is only able to do a small portion of the typical 30 hours she was spending with Pt per week  Yvrose said that Pt's behavioral therapist, Vikki Pabon, is to call Pt norma & let her know that her hours are going to be cut back  Yvrose said that Pt had worked with [employer] for about a year and a half, & they felt it was best she be made aware of this while she is in the hospital   Yvrose also said that Pt's boss texted Yvrose today & let her know that they can't hold Pt's job for her any longer, but Yvrose said they will have this discussion with Pt later  CM inquired about Pt's focus on death, & stating initially that she had done something she couldn't talk about  Yvrose said that as far as they know, Pt had received notification on a [employer] that a celebrity had passed away, & for some reason this was upsetting to her  Yvrose said that she & her  went away for their anniversary & Pt stayed home with the cat, & then stayed with her grandfather who is 79 y/o, & just continued to perseverate on aging & dying  She was focused on being having no siblings & what would happen when her parents die    CM reviewed that they would wait to hear what [facility] said after reviewing clinical & go from there  CM agreed to provide on-going updates  CM contacted Pt's Behavioral Specialist through [address], [place] [phone] & left a message seeking a returned call

## 2019-03-05 NOTE — PROGRESS NOTES
Pt at desk frequently, multiple reassurances given  Appeared upset following family visit, able to process well regarding her feelings  Pt able to remain calm, attended group

## 2019-03-05 NOTE — PROGRESS NOTES
Upon follow up on PRN Atarax, pt reports "I think it helped"  Pt reported laying down for a while and then attended afternoon group  Pt then returned to writer stating "I have a hard time figuring out if something helped"  Writer provided reassurance to pt and reminded her she was able to attend and participate in group  Pt receptive

## 2019-03-05 NOTE — PROGRESS NOTES
Denies SI/HI/AVH  Patient reports feeling "blah" stating that she is bored on the unit  She is anxious for discharge but worried she will begin to perseverate on death again  Encouraged patient to utilize coping techniques that she's learned here when she leaves

## 2019-03-05 NOTE — PROGRESS NOTES
Progress Note - Behavioral Health   Vanessa Reasons 41 y o  female MRN: [id number]  Unit/Bed#: [facility] 260-01 Encounter: [id number]    Assessment/Plan   Principal Problem:    Bipolar disorder, current episode mixed, moderate (HCC)  Active Problems:    OCD (obsessive compulsive disorder)    Generalized anxiety disorder      Subjective:  Patient focused on discharge at this time  States that she was to leave hospital because she is bored and has nothing to do  Seen evidently anxious due to this  Did require PRN Atarax  Appears to have excessive worrying and rumination of wanting to leave the hospital   Obsessions have decreased and does not have compulsive behavior  Did say she is more irritable today because she remains in the hospital   Reports depression is decreased and is more forward thinking at this time  Denied psychotic symptoms  Does not endorse criteria for lisa  Agrees to go to crisis residence at time discharge  Did deny any suicidal or homicidal ideations  Medication compliant  Appears to be tolerating medications well without serious side effects  Limited insight and judgment      Current Medications:  Current Facility-Administered Medications   Medication Dose Route Frequency    acetaminophen (TYLENOL) tablet 325 mg  325 mg Oral Q6H PRN    acetaminophen (TYLENOL) tablet 650 mg  650 mg Oral Q6H PRN    aluminum-magnesium hydroxide-simethicone (MYLANTA) 200-200-20 mg/5 mL oral suspension 30 mL  30 mL Oral Q4H PRN    benztropine (COGENTIN) injection 1 mg  1 mg Intramuscular Q6H PRN    benztropine (COGENTIN) tablet 1 mg  1 mg Oral Q6H PRN    divalproex sodium (DEPAKOTE) EC tablet 500 mg  500 mg Oral QAM    divalproex sodium (DEPAKOTE) EC tablet 750 mg  750 mg Oral After Dinner    fluvoxaMINE (LUVOX) tablet 75 mg  75 mg Oral HS    haloperidol (HALDOL) tablet 5 mg  5 mg Oral Q6H PRN    haloperidol lactate (HALDOL) injection 5 mg  5 mg Intramuscular Q6H PRN    hydrOXYzine HCL (ATARAX) tablet 25 mg  25 mg Oral Q6H PRN    ibuprofen (MOTRIN) tablet 800 mg  800 mg Oral Q8H PRN    LORazepam (ATIVAN) 2 mg/mL injection 2 mg  2 mg Intramuscular Q6H PRN    LORazepam (ATIVAN) tablet 0 5 mg  0 5 mg Oral Q8H PRN    LORazepam (ATIVAN) tablet 1 mg  1 mg Oral BID    magnesium hydroxide (MILK OF MAGNESIA) 400 mg/5 mL oral suspension 30 mL  30 mL Oral Daily PRN    melatonin tablet 3 mg  3 mg Oral HS    OLANZapine (ZyPREXA) IM injection 10 mg  10 mg Intramuscular Q8H PRN    OLANZapine (ZyPREXA) tablet 10 mg  10 mg Oral Q8H PRN    pantoprazole (PROTONIX) EC tablet 20 mg  20 mg Oral BID    risperiDONE (RisperDAL M-TABS) dispersible tablet 1 mg  1 mg Oral Q3H PRN    traZODone (DESYREL) tablet 50 mg  50 mg Oral HS PRN    zolpidem (AMBIEN) tablet 10 mg  10 mg Oral HS       Behavioral Health Medications: all current active meds have been reviewed and continue current psychiatric medications  Vitals:  Vitals:    03/04/19 1527   BP: 154/74   Pulse: 105   Resp: 20   Temp: (!) 97 2 °F (36 2 °C)   SpO2:        Laboratory results:    I have personally reviewed all pertinent laboratory/tests results    Most Recent Labs:   Lab Results   Component Value Date    WBC 7 47 03/01/2019    RBC 4 34 03/01/2019    HGB 13 9 03/01/2019    HCT 41 9 03/01/2019     03/01/2019    RDW 12 9 03/01/2019    NEUTROABS 3 32 03/01/2019    SODIUM 142 03/04/2019    K 4 1 03/04/2019     03/04/2019    CO2 25 03/04/2019    BUN 9 03/04/2019    CREATININE 0 77 03/04/2019    GLUC 100 03/04/2019    CALCIUM 8 9 03/04/2019    AST 6 03/04/2019    ALT 17 03/04/2019    ALKPHOS 53 03/04/2019    TP 6 8 03/04/2019    ALB 3 8 03/04/2019    TBILI 0 40 03/04/2019    CHOLESTEROL 152 03/01/2019    HDL 41 03/01/2019    TRIG 78 03/01/2019    LDLCALC 95 03/01/2019    NONHDLC 111 03/01/2019    VALPROICTOT 57 03/04/2019    OMU1RBXPWTGA 2 573 02/28/2019    PREGSERUM Negative 02/28/2019    RPR Non-Reactive 02/28/2019    HGBA1C 5 5 03/01/2019     03/01/2019       Psychiatric Review of Systems:  Behavior over the last 24 hours:  unchanged  Sleep: normal  Appetite: normal  Medication side effects: No  ROS: constipation    Mental Status Evaluation:  Appearance:  casually dressed   Behavior:  guarded and restless and fidgety   Speech:  soft   Mood:  anxious   Affect:  constricted   Language sparse   Thought Process:  [place], perseverative, goal directed   Thought Content:  obsessions   Perceptual Disturbances: None   Risk Potential: Denied SI/HI  Potential for aggression: no   Sensorium:  person, place and time/date   Cognition:  grossly intact   Consciousness:  alert and awake    Recent and Remote Memory decreased   Attention: attention span appeared shorter than expected for age   Insight:  limited   Judgment: limited   Gait/Station: normal gait/station and normal balance   Motor Activity: no abnormal movements     Progress Toward Goals: slow progression    Recommended Treatment: Continue with group therapy, milieu therapy and occupational therapy  1   Continue current medications  2  Step down to crisis residence in coming days    Risks, benefits and possible side effects of Medications:   Risks, benefits, and possible side effects of medications explained to patient and patient verbalizes understanding        Moiz Box PA-C

## 2019-03-05 NOTE — CASE MANAGEMENT
CM met with Pt, who expressed that she was bored, & wanted to go home  CM & Pt reviewed plan to step down to a crisis residence at [facility], & Pt agreeable; HENRY obtained  CM assisted Pt with getting a radio to listen to in her room

## 2019-03-05 NOTE — PROGRESS NOTES
Pt received MOM at 0923 this morning, reports BM since that time  Pt presented to med room requesting medication for anxiety  Describes she is tired of being in the hospital, appears tearful, restless  Administered PRN Atarax  Will monitor for effectiveness

## 2019-03-05 NOTE — PROGRESS NOTES
Clinical Pharmacy Note: Valproic Acid    Alycia Marlow is a 41 y o  female who presents with bipolar disorder  Assessment/Plan:    VPA indication: mood disorder     Axel Mclaughlin is currently taking 500 mg every morning and 750 mg at dinner of  delayed release tablet     Valproic Acid concentration is 57 ug/mL on 3/4/19 and was drawn at steady state  It is recommended to continue current regimen and if needed there is room to increase the dosage   Pharmacist has reviewed liver function tests, pancreatic enzymes, and pregnancy test (if drawn) or made recommendations for such tests YES    Pregnancy test was completed  HCG negative on 2/28/19  Pharmacy Recommendations:     Depakote level is in therapeutic range  If needed (per provider's discretion) the dosage can be increased further  Monitoring:    Valproic Acid:  0   Lab Value Date/Time    VALPROICTOT 57 03/04/2019 0603       Liver Function:  0   Lab Value Date/Time    ALB 3 8 03/04/2019 0603    ALB 3 5 03/01/2019 0556     0   Lab Value Date/Time    TBILI 0 40 03/04/2019 0603    TBILI 0 30 03/01/2019 0556     0   Lab Value Date/Time    AST 6 03/04/2019 0603    AST 7 03/01/2019 0556     0   Lab Value Date/Time    ALT 17 03/04/2019 0603    ALT 18 03/01/2019 0556     No results found for: INR    Platelets:  0   Lab Value Date/Time     03/01/2019 0556       Therapeutic valproic acid levels are  ug/mL, but target range varies by indication  Levels above 150 ug/mL indicate toxicity, although toxicity may be associated with levels within therapeutic ranges based on symptoms  If switching from delayed release to extended release formulation, increase dose by 8 to 20% and dose daily to maintain therapeutic serum levels  Monitor pancreatic enzymes if patient presents with abdominal pain consistent with pancreatitis which is a black box warning    Also, monitor liver function for black box warning of hepatotoxicity, check ammonia level if suspected  Suspect toxicity in stabilized patients if new-onset sedation, nausea, vomiting, diarrhea, and/or tremor  Reassess valproic acid level if liver function or mental status changes  May cause dose-related thrombocytopenia, inhibition of platelet aggregation, and bleeding  In some cases, platelet counts may be normalized with continued treatment; however, reduce dose or discontinue drug if patient develops evidence of hemorrhage, bruising, or a disorder of hemostasis/coagulation  Females of child bearing age should be on birth control due to very high teratogenic potential      Pharmacy will continue to follow patient with team  Thank you      Electronically signed by: Shruthi Massey, PharmD, Clinical Pharmacist - Psychiatry

## 2019-03-06 PROCEDURE — 99232 SBSQ HOSP IP/OBS MODERATE 35: CPT | Performed by: PSYCHIATRY & NEUROLOGY

## 2019-03-06 RX ORDER — DICYCLOMINE HYDROCHLORIDE 10 MG/1
20 CAPSULE ORAL EVERY 8 HOURS PRN
Status: DISCONTINUED | OUTPATIENT
Start: 2019-03-06 | End: 2019-03-08 | Stop reason: HOSPADM

## 2019-03-06 RX ADMIN — PANTOPRAZOLE SODIUM 20 MG: 20 TABLET, DELAYED RELEASE ORAL at 08:08

## 2019-03-06 RX ADMIN — ALUMINUM HYDROXIDE, MAGNESIUM HYDROXIDE, AND SIMETHICONE 30 ML: 200; 200; 20 SUSPENSION ORAL at 10:41

## 2019-03-06 RX ADMIN — PANTOPRAZOLE SODIUM 20 MG: 20 TABLET, DELAYED RELEASE ORAL at 17:08

## 2019-03-06 RX ADMIN — LORAZEPAM 1 MG: 1 TABLET ORAL at 17:08

## 2019-03-06 RX ADMIN — DICYCLOMINE HYDROCHLORIDE 20 MG: 10 CAPSULE ORAL at 09:44

## 2019-03-06 RX ADMIN — FLUVOXAMINE MALEATE 75 MG: 50 TABLET, FILM COATED ORAL at 21:05

## 2019-03-06 RX ADMIN — LORAZEPAM 1 MG: 1 TABLET ORAL at 08:09

## 2019-03-06 RX ADMIN — MELATONIN 3 MG: 3 TAB ORAL at 21:05

## 2019-03-06 RX ADMIN — DIVALPROEX SODIUM 500 MG: 500 TABLET, DELAYED RELEASE ORAL at 08:09

## 2019-03-06 RX ADMIN — DIVALPROEX SODIUM 750 MG: 500 TABLET, DELAYED RELEASE ORAL at 17:09

## 2019-03-06 RX ADMIN — ZOLPIDEM TARTRATE 10 MG: 5 TABLET, FILM COATED ORAL at 21:05

## 2019-03-06 NOTE — CASE MANAGEMENT
ESTELLA received a call from Pt's mom, Yvrose, who reported that if she is allowed to provide transportation to [address] [place] [address], she is willing to do so  Yvrose said that Pt's visit with her aunt went well, however, she did ask her to leave the visit early  CM & Yvrose discussed that Pt has reported to CM & family that she is concerned about having something to talk about with visitors  Yvrose said that earlier in the week they had helped Pt write a list of things she could talk about with visitors & also encouraged that if she wasn't sure what to talk about, asking her visitors how they were feeling or what they had done  Yvrose said that Pt told her she isn't going to groups & was just going to lay in bed today  CM said that Pt did go to groups yesterday, however, would leave early  CM said that she would follow-up with staff regarding her participation today  CM asked about Pt reporting abdominal cramping, & Yvrose said that Pt is lactose intolerant & said that she thinks she had too much creamer  She said that Pt knows that she can have a little, but she thinks she has been overdoing it while in the hospital   Yvrose said that even though Pt is reported anxiety, she feels she is more depressed at this time  She said that when Pt is anxious, her leg will shake, & it has been so severe in the past that she would rock the car with her leg shaking  CM agreed to provide an update once she hears from [address] [place] [address]

## 2019-03-06 NOTE — PROGRESS NOTES
Pt anxious, expresses feeling conflicted over discharge  Pt feels bored and wants to leave hospital, but is afraid she will not be successful  Pt reports she does have difficulty with change  Pt reassured, appears calm  Pt stated she would like to rest in room, using sound machine to relax  Denies SI  Appears calmer  Pt attended group

## 2019-03-06 NOTE — CASE MANAGEMENT
CM contacted [facility] @ [phone] & spoke with Rolando Davis, who said that due to their equipment being down yesterday, he was behind in reviewing the referrals, but confirmed he would be in contact this afternoon  CM contacted [employer] [facility] coordinator, [place], [place] [phone] to review possible need for transportation; she asked that request be made now & can be cancelled if needed  CM electronically sent request     CM contacted Pt's mom, Yvrose, @ [phone] & left a message to let her know that CM was still waiting to hear from [facility]  was told she would be contacted by this afternoon  CM returned a phone call to Pt's behavioral specialist, John Coreas, @ [phone] & provided an update

## 2019-03-06 NOTE — CASE MANAGEMENT
CM met with Pt who was almost in tears, stating she can't take being here (hospital) any longer  Pt said that she has tried coping skills, but doesn't know what else to do  CM reviewed that she had been on the unit & would go back to check her voicemail to see if [facility] had contacted her yet or not  CM had not received any messages, & so called [facility] @ [phone] & spoke with Henna Levin, who confirmed they had Pt's referral, however, didn't have any current openings  CM asked if they were anticipating any in the next day or two, & he said probably the beginning of next week  CM received a call from Pt's mom, Yvrose, who said that Pt had called her 8 times & was reporting anxiety & that she thought she needed medication  CM reviewed above & reported that Pt may decompensate if she has to wait until Monday, & may due better being able to go home  Yrvose said that she had great concerns with Pt coming home in the current state that she is in, & would have to ensure that all of Pt's supports were in place to provide there services  Yvrose said that she is off work tomorrow & available  CM said that she review this with the treatment team & call Yvrose in the morning

## 2019-03-06 NOTE — PROGRESS NOTES
Pt anxious throughout evening  Asked to speak with nursing staff multiple times, reports being "scared of the unknown"  RN provided encouragement and support with coping skills  Pt attended groups, had visit with aunt, and spoke with her parents on the phone  Will continue to monitor

## 2019-03-06 NOTE — CASE MANAGEMENT
CM met with Pt who reported she had been able to calm down earlier  But again became almost tearful stating she just wanted to go home tomorrow  CM asked if Pt felt she was ready to go home & she said not, that she felt she needed to go to crisis res  CM inquired what if crisis residence didn't have a bed available did Pt think she would be ready to go directly home, if she does well over the next 24 hours? Pt said that she didn't think she could go home with how she was feeling, but then in the next breath said she just wanted to go home  CM asked Pt to read her words of affirmation & maybe do some deep breathing  CM said that they would talk with the treatment team tomorrow & Pt's mom; Pt agreed  CM contacted [facility] @ [phone] & spoke with Erica, to inquire if Pt were to discharge, & not do well, does crisis come out to the home to assess & possibly refer to crisis res? Erica said that they do that, & also if Pt discharges home(parent's home) they would schedule to come out & meet with her & provide them information on their services  She asked that CM call to notify them if/when Pt discharges

## 2019-03-06 NOTE — PROGRESS NOTES
Awake and cooperative during interaction  Denies SI/HI or thoughts of self harm  Denies AH/VH  Reports fluctuating anxiety related to discharge and realizes she must take each day one step at a time  Discussed healthy coping skills: enjoys deep breathing, exercising at the gym and her animals  Reports parents are supportive  Compliant with medications, denies side effects

## 2019-03-06 NOTE — PROGRESS NOTES
Progress Note - Behavioral Health   Keagan Nurse 41 y o  female MRN: [id number]  Unit/Bed#: U 260-01 Encounter: [id number]    Assessment/Plan   Principal Problem:    Bipolar disorder, current episode mixed, moderate (HCC)  Active Problems:    OCD (obsessive compulsive disorder)    Generalized anxiety disorder    Subjective:  Patient is compliant with medications with no acute side effects  Patient reports improvement in mood and anxiety  She did expressed anxiety over discharge planning and reports having abdominal cramps  Agreed with plan of considering Bentyl p r n  Ema Burow Patient remained appropriate and continues to deny endorsing suicidal or homicidal ideations  No manic or psychotic symptoms noted  Agreed with plan of initiating discharge planning based on bed availability       Current Medications:    Current Facility-Administered Medications:  acetaminophen 325 mg Oral Q6H PRN Rollo Zhao, PA-C   acetaminophen 650 mg Oral Q6H PRN Rollo Zhao, PA-C   aluminum-magnesium hydroxide-simethicone 30 mL Oral Q4H PRN Gato Laguerre MD   benztropine 1 mg Intramuscular Q6H PRN Gato Laguerre MD   benztropine 1 mg Oral Q6H PRN Gato Laguerre MD   dicyclomine 20 mg Oral Q8H PRN [facility]   divalproex sodium 500 mg Oral QAM Rollo Zhao, PA-C   divalproex sodium 750 mg Oral After Amato Soup, PA-C   fluvoxaMINE 75 mg Oral HS [facility]   haloperidol 5 mg Oral Q6H PRN Rollo Zhao, PA-C   haloperidol lactate 5 mg Intramuscular Q6H PRN Rollo Zhao, PA-C   hydrOXYzine HCL 25 mg Oral Q6H PRN Gato Laguerre MD   ibuprofen 800 mg Oral Q8H PRN Rollo Zhao, PA-C   LORazepam 2 mg Intramuscular Q6H PRN Rollo Zhao, PA-C   LORazepam 0 5 mg Oral Q8H PRN Rollo Zhao, PA-C   LORazepam 1 mg Oral BID Rollo Zhao, PA-C   magnesium hydroxide 30 mL Oral Daily PRN Gato Laguerre MD   melatonin 3 mg Oral HS [facility]   OLANZapine 10 mg Intramuscular Q8H PRN Quentin Morton ANISA Rich   OLANZapine 10 mg Oral Q8H PRN [place] Matter, ANISA   pantoprazole 20 mg Oral BID Tyler Valenzuela   risperiDONE 1 mg Oral Q3H PRN [place] Matter, ANISA   traZODone 50 mg Oral HS PRN [place] Matter, ANISA   zolpidem 10 mg Oral HS [address] Medications: all current active meds have been reviewed  Vital signs in last 24 hours:  Temp:  [97 5 °F (36 4 °C)-97 7 °F (36 5 °C)] 97 5 °F (36 4 °C)  HR:  [72-82] 82  Resp:  [18-20] 20  BP: (120-139)/(80-84) 120/80    Laboratory results:    I have personally reviewed all pertinent laboratory/tests results    Labs in last 72 hours:   Recent Labs     03/04/19  0603   SODIUM 142   K 4 1      CO2 25   BUN 9   CREATININE 0 77   GLUC 100   GLUF 100*   CALCIUM 8 9   AST 6   ALT 17   ALKPHOS 53   TP 6 8   ALB 3 8   TBILI 0 40   VALPROICTOT 57     Admission Labs:   Admission on 02/27/2019   Component Date Value    Preg, Serum 02/28/2019 Negative     Amph/Meth UR 02/27/2019 Negative     Barbiturate Ur 02/27/2019 Negative     Benzodiazepine Urine 02/27/2019 Negative     Cocaine Urine 02/27/2019 Negative     Methadone Urine 02/27/2019 Negative     Opiate Urine 02/27/2019 Negative     PCP Ur 02/27/2019 Negative     THC Urine 02/27/2019 Negative     RPR 02/28/2019 Non-Reactive     TSH 3RD GENERATON 02/28/2019 2 573     Color, UA 02/28/2019 Yellow     Clarity, UA 02/28/2019 Cloudy     Specific Gravity, UA 02/28/2019 1 020     pH, UA 02/28/2019 7 0     Leukocytes, UA 02/28/2019 Negative     Nitrite, UA 02/28/2019 Negative     Protein, UA 02/28/2019 Negative     Glucose, UA 02/28/2019 Negative     Ketones, UA 02/28/2019 Negative     Urobilinogen, UA 02/28/2019 0 2     Bilirubin, UA 02/28/2019 Negative     Blood, UA 02/28/2019 Negative     Hemoglobin A1C 03/01/2019 5 5     EAG 03/01/2019 111     WBC 03/01/2019 7 47     RBC 03/01/2019 4 34     Hemoglobin 03/01/2019 13 9     Hematocrit 03/01/2019 41 9     MCV 03/01/2019 97     MCH 03/01/2019 32 0     MCHC 03/01/2019 33 2     RDW 03/01/2019 12 9     MPV 03/01/2019 12 4     Platelets 38/30/5505 195     nRBC 03/01/2019 0     Neutrophils Relative 03/01/2019 44     Immat GRANS % 03/01/2019 0     Lymphocytes Relative 03/01/2019 42     Monocytes Relative 03/01/2019 10     Eosinophils Relative 03/01/2019 3     Basophils Relative 03/01/2019 1     Neutrophils Absolute 03/01/2019 3 32     Immature Grans Absolute 03/01/2019 0 02     Lymphocytes Absolute 03/01/2019 3 14     Monocytes Absolute 03/01/2019 0 71     Eosinophils Absolute 03/01/2019 0 24     Basophils Absolute 03/01/2019 0 04     Sodium 03/01/2019 143     Potassium 03/01/2019 4 4     Chloride 03/01/2019 106     CO2 03/01/2019 29     ANION GAP 03/01/2019 8     BUN 03/01/2019 11     Creatinine 03/01/2019 0 75     Glucose 03/01/2019 91     Glucose, Fasting 03/01/2019 91     Calcium 03/01/2019 8 3     AST 03/01/2019 7     ALT 03/01/2019 18     Alkaline Phosphatase 03/01/2019 58     Total Protein 03/01/2019 6 3*    Albumin 03/01/2019 3 5     Total Bilirubin 03/01/2019 0 30     eGFR 03/01/2019 102     Cholesterol 03/01/2019 152     Triglycerides 03/01/2019 78     HDL, Direct 03/01/2019 41     LDL Calculated 03/01/2019 95     Non-HDL-Chol (CHOL-HDL) 03/01/2019 111     Valproic Acid, Total 03/04/2019 57     Sodium 03/04/2019 142     Potassium 03/04/2019 4 1     Chloride 03/04/2019 105     CO2 03/04/2019 25     ANION GAP 03/04/2019 12     BUN 03/04/2019 9     Creatinine 03/04/2019 0 77     Glucose 03/04/2019 100     Glucose, Fasting 03/04/2019 100*    Calcium 03/04/2019 8 9     AST 03/04/2019 6     ALT 03/04/2019 17     Alkaline Phosphatase 03/04/2019 53     Total Protein 03/04/2019 6 8     Albumin 03/04/2019 3 8     Total Bilirubin 03/04/2019 0 40     eGFR 03/04/2019 99        Psychiatric Review of Systems:  Behavior over the last 24 hours:  improving  Sleep: normal  Appetite: normal  Medication side effects: No  ROS: Abdominal cramps-likely from anxiety-Bentyl p r n  Added    Mental Status Evaluation:  Appearance:  casually dressed   Behavior:  guarded   Speech:  soft   Mood:  anxious   Affect:  constricted   Language naming objects and repeating phrases   Thought Process:  circumstantial   Thought Content:  obsessions   Perceptual Disturbances: None   Risk Potential: Suicidal Ideations none, Homicidal Ideations none and Potential for Aggression No   Sensorium:  person, place and time/date   Cognition:  grossly intact   Consciousness:  awake    Attention: attention span appeared shorter than expected for age   Insight:  fair   Judgment: fair   Intellect fa isir   Gait/Station: normal gait/station and normal balance   Motor Activity: no abnormal movements     Memory: Short and long term memory  fair     Progress Toward Goals: progressing    Recommended Treatment:   Initiate discharge planning  Continue with group therapy, milieu therapy and occupational therapy      Continue following current medications:   Current Facility-Administered Medications:  acetaminophen 325 mg Oral Q6H PRN Hal Person, PA-C   acetaminophen 650 mg Oral Q6H PRN Hal Person, PA-FABIOLA   aluminum-magnesium hydroxide-simethicone 30 mL Oral Q4H PRN Ariana Fowler MD   benztropine 1 mg Intramuscular Q6H PRN Ariana Fowler MD   benztropine 1 mg Oral Q6H PRN Ariana Fowler MD   dicyclomine 20 mg Oral Q8H PRN Tyler Valenzuela   divalproex sodium 500 mg Oral QAM Hal Person, PA-FABIOLA   divalproex sodium 750 mg Oral After Amato Soup, PA-C   fluvoxaMINE 75 mg Oral HS Tyler Valenzuela   haloperidol 5 mg Oral Q6H PRN Hal Person, PA-C   haloperidol lactate 5 mg Intramuscular Q6H PRN Hal Person, PA-C   hydrOXYzine HCL 25 mg Oral Q6H PRN Ariana Fowler MD   ibuprofen 800 mg Oral Q8H PRN Hal Person, PA-C   LORazepam 2 mg Intramuscular Q6H PRN Hal Person, PA-FABIOLA   LORazepam 0 5 mg Oral Q8H PRN Patricia Fregoso PA-C   LORazepam 1 mg Oral BID Patricia Fregoso PA-C   magnesium hydroxide 30 mL Oral Daily PRN Ronny Palmer MD   melatonin 3 mg Oral HS Tyler Valenzuela   OLANZapine 10 mg Intramuscular Q8H PRN Patricia Fregoso PA-C   OLANZapine 10 mg Oral Q8H PRN Patricia Fregoso PA-C   pantoprazole 20 mg Oral BID Tyler Valenzuela   risperiDONE 1 mg Oral Q3H PRN Patricia Fregoso PA-C   traZODone 50 mg Oral HS PRN Patricia Fregoso PA-C   zolpidem 10 mg Oral HS Tyler Valenzuela       Risks, benefits and possible side effects of Medications:   Risks, benefits, and possible side effects of medications explained to patient and patient verbalizes understanding  Risks of medications in pregnancy explained if female patient  Patient verbalizes understanding and agrees to notify her doctor if she becomes pregnant  This note has been constructed using a voice recognition system  There may be translation, syntax,  or grammatical errors  If you have any questions, please contact the dictating provider

## 2019-03-07 PROCEDURE — 99232 SBSQ HOSP IP/OBS MODERATE 35: CPT | Performed by: PSYCHIATRY & NEUROLOGY

## 2019-03-07 RX ORDER — LORAZEPAM 1 MG/1
1 TABLET ORAL 2 TIMES DAILY
Qty: 60 TABLET | Refills: 1 | Status: SHIPPED | OUTPATIENT
Start: 2019-03-07

## 2019-03-07 RX ORDER — DIVALPROEX SODIUM 250 MG/1
TABLET, DELAYED RELEASE ORAL
Qty: 30 TABLET | Refills: 1 | Status: SHIPPED | OUTPATIENT
Start: 2019-03-07

## 2019-03-07 RX ORDER — FLUVOXAMINE MALEATE 25 MG
75 TABLET ORAL
Qty: 90 TABLET | Refills: 1 | Status: SHIPPED | OUTPATIENT
Start: 2019-03-07

## 2019-03-07 RX ORDER — PANTOPRAZOLE SODIUM 20 MG/1
20 TABLET, DELAYED RELEASE ORAL 2 TIMES DAILY
Qty: 60 TABLET | Refills: 0 | Status: SHIPPED | OUTPATIENT
Start: 2019-03-07

## 2019-03-07 RX ORDER — DIVALPROEX SODIUM 500 MG/1
TABLET, DELAYED RELEASE ORAL
Qty: 60 TABLET | Refills: 1 | Status: SHIPPED | OUTPATIENT
Start: 2019-03-07

## 2019-03-07 RX ORDER — ZOLPIDEM TARTRATE 10 MG/1
10 TABLET ORAL
Qty: 30 TABLET | Refills: 1 | Status: SHIPPED | OUTPATIENT
Start: 2019-03-07

## 2019-03-07 RX ORDER — LANOLIN ALCOHOL/MO/W.PET/CERES
3 CREAM (GRAM) TOPICAL
Qty: 30 TABLET | Refills: 1 | Status: SHIPPED | OUTPATIENT
Start: 2019-03-07

## 2019-03-07 RX ADMIN — LORAZEPAM 1 MG: 1 TABLET ORAL at 17:15

## 2019-03-07 RX ADMIN — PANTOPRAZOLE SODIUM 20 MG: 20 TABLET, DELAYED RELEASE ORAL at 17:14

## 2019-03-07 RX ADMIN — ZOLPIDEM TARTRATE 10 MG: 5 TABLET, FILM COATED ORAL at 21:27

## 2019-03-07 RX ADMIN — DIVALPROEX SODIUM 500 MG: 500 TABLET, DELAYED RELEASE ORAL at 08:05

## 2019-03-07 RX ADMIN — MELATONIN 3 MG: 3 TAB ORAL at 21:27

## 2019-03-07 RX ADMIN — LORAZEPAM 1 MG: 1 TABLET ORAL at 08:05

## 2019-03-07 RX ADMIN — FLUVOXAMINE MALEATE 75 MG: 50 TABLET, FILM COATED ORAL at 21:26

## 2019-03-07 RX ADMIN — PANTOPRAZOLE SODIUM 20 MG: 20 TABLET, DELAYED RELEASE ORAL at 08:05

## 2019-03-07 RX ADMIN — DIVALPROEX SODIUM 750 MG: 500 TABLET, DELAYED RELEASE ORAL at 17:14

## 2019-03-07 NOTE — PROGRESS NOTES
Pt states she is bored and anxious about discharge  Requested radio and listened to in bedroom briefly  Pt denies SI  Stated she is anxious regarding discharge because she worries she won't feel better  Pt does report improved anxiety since admission  Discussed use of coping skills at home and reviewed importance of continuing meds/attending outpatient care

## 2019-03-07 NOTE — CASE MANAGEMENT
CM contacted [facility] @ [phone] & spoke with Merrick Pacheco, who reported that they have to take referrals from their own hospital first, so even though they had received Pt's referral, & they had availability, they got a referral from the hospital, which then became the priority  Merrick Pacheco said that he would keep the referral, & they have some discharges scheduled for next week, & he asked CM to call back on Monday  CM contacted Pt's behavioral specialist through [address], [place] [phone] to review that the [facility] did not have a bed for Pt, & had instructed CM to call back on Monday  CM said that the doctor felt that Pt was ready for discharge, & has been for two days  CM inquired if Pt's community supports could be put into place by tomorrow for her to discharge? Elena Travis said that she planned to see Pt Sat, & she would follow up regarding her other supports  CM contacted [facility] @ [phone] & left a message for Pt's therapist, requesting an appointment for Mon or Tues for Pt  CM received a return message that she could see Pt on Mon at 2 or 3 PM or their usual appointment time of Tues at 3 PM     CM held a conference call with Pt's parents, Emerald Rhodes to review discharge plans  CM reported that the treatment team felt that Pt was ready for discharge & much of her anxiety was due to some of the acuity of the unit; both agreed  CM reviewed that Pt's supports could be in place to work with her over the weekend, Pt was scheduled to see Dr Santos Khan on Thurs, & CM had requested that Pt's therapist see her on Mon/Tues  CM also reviewed services through [facility] [address] with the phone number, so she could call & talk to them directly about services/support they offered  Stan said that he would be traveling over the weekend, so Pt's care would fall on her    Yvrose reported she felt comfortable with this plan, & all agreed for Pt to join the conversation  CM got Pt from her room & all parties reviewed the discharge plan  Pt reported she was nervous & anxious, but also happy to be going home; Pt smiled for the first time in the past 2 days  Pt agreeable with her mom picking her up tomorrow afternoon, after her mom's appointment in [facility]; all agreed it was best that Pt not go home alone  Yvrose asked that Pt's prescriptions be faxed to [facility] & provided phone number [phone]  Pt had no questions  ESTELLA reviewed with Pt times her therapist could see her & she asked for Mon at 3 PM; ESTELLA left a message for Neil Contreras @ [phone] requesting this appointment time  CM contacted Iban Guerrero @ [phone] & she confirmed she would talk to Pt's mom to set time for her visit on Saturday, but it would most likely be in the afternoon

## 2019-03-07 NOTE — PROGRESS NOTES
Cooperative upon waking this AM   Denies SI/HI or thoughts of self harm  Anxious for discharge, says she is bored here on the unit and feels she is ready to go home  Patient spoke with mother on phone and following conversation she came to nurses station and reported increased anxiety  Provided sound machine and deep breathing and encouraged group participation  Denies N/V/D  Compliant with meals and medications

## 2019-03-07 NOTE — DISCHARGE INSTR - OTHER ORDERS
Crisis Services Crisis is not simply the moment when things become intolerable  Crises build over time, and often can be recognized and managed in advance  [address] provides not only immediate support for crisis situations, but also assistance with managing recurring or future crises  Support is available 24 hours a day, 7 days a week at [phone] ([phone])  This service is available to anyone in [facility], including children, teens, adults, and families  Stages of Crisis Management  Before a crisis  When you start to recognize the stressors that you or a loved one have felt during previous crises, please call Jennifer Saini peer support talk line at [phone]  It is available, free of charge, 7 days a week, 1:00pm to 9:00pm   [facility] also has a [employer] that can be reached by calling [phone] or texting [phone]  It is available Monday through Friday, 3:00pm to 9:00pm     During a crisis  When you or a loved one are experiencing a crisis, [facility] is available to help  Just call [phone]  The line is open 24 hours per day, 7 days per week  After a crisis  [facility] would like to help you develop ways to help reduce future crisis situations and create a crisis plan as part of your (or your child's, or your family's) recovery and wellness goals  Text CONNECT to 594078 from anywhere in the [place], anytime, about any type of crisis  A live, trained Crisis Counselor receives the text and lets you know that they are here to listen  The volunteer Crisis Counselor will help you move from a hot moment to a cool moment  [address]  -[employer]  provides Crisis Intervention for [address] [place] [address]  Anyone in [address] [place] [address], residents or visitors, can call the contact information listed and talk to someone who can help    If you have been experiencing depression, anxiety, suicidal thoughts, homicidal thoughts, or if you dont know what to do ro who to turn to, [facility] can help! Please call! Toll Free([phone])    Text ruOK to 730-174-JTCW([phone])    Crisis Intervention & Emergency Services can be reached 24 hours a day by calling the numbers above & asking to speak with a Crisis worker  **FOR IMMEDIATE CRISIS INTERVENTION SERVICES IN [place], CONTACT [phone]  **    Support Group Information:  [facility] Jen Silva, [address]  L)[phone]  Email: [email or web address]  [place]   : Etelvina Monsalve  Meetings/Month : 2nd and 4th Tuesdays of each month 7-9:00pm    Support Group Information  [employer] [facility] [address], [address] [place] [address]  (B)[phone]   : Trish Perry  Meetings/Month : 2nd Wednesday of each month @ 7pm    Crisis Text Line: Text CONNECT to 628725 from anywhere in the [place], anytime, about any type of crisis  A live, trained Crisis Counselor receives the text and lets you know that they are here to listen  The volunteer Crisis Counselor will help you move from a hot moment to a cool moment

## 2019-03-07 NOTE — PROGRESS NOTES
Progress Note - Behavioral Health   Loreta Manzanares 41 y o  female MRN: [id number]  Unit/Bed#: [facility] 260-01 Encounter: [id number]    Assessment/Plan   Principal Problem:    Bipolar disorder, current episode mixed, moderate (HCC)  Active Problems:    OCD (obsessive compulsive disorder)    Generalized anxiety disorder      Subjective:  Patient hyper focused on discharge to home  Reports she is bored and that she has anticipatory anxiety to leave  Appears anxious in regards only to discharge  Depression is rated as decreased and is denying suicidal thoughts at this time  Sleep and appetite are improved  Is seen out and attending groups at times  Does remain with evident obsessions, however appear less intense  Denied psychotic symptoms  Denied delusional material   Does not endorse criteria for lisa at this time  Medication compliant  Appears to be tolerating medications well without serious side effects  Tentative discharge to home tomorrow  Case management has collaborated with family and they agree to pick patient up tomorrow      Current Medications:  Current Facility-Administered Medications   Medication Dose Route Frequency    acetaminophen (TYLENOL) tablet 325 mg  325 mg Oral Q6H PRN    acetaminophen (TYLENOL) tablet 650 mg  650 mg Oral Q6H PRN    aluminum-magnesium hydroxide-simethicone (MYLANTA) 200-200-20 mg/5 mL oral suspension 30 mL  30 mL Oral Q4H PRN    benztropine (COGENTIN) injection 1 mg  1 mg Intramuscular Q6H PRN    benztropine (COGENTIN) tablet 1 mg  1 mg Oral Q6H PRN    dicyclomine (BENTYL) capsule 20 mg  20 mg Oral Q8H PRN    divalproex sodium (DEPAKOTE) EC tablet 500 mg  500 mg Oral QAM    divalproex sodium (DEPAKOTE) EC tablet 750 mg  750 mg Oral After Dinner    fluvoxaMINE (LUVOX) tablet 75 mg  75 mg Oral HS    haloperidol (HALDOL) tablet 5 mg  5 mg Oral Q6H PRN    haloperidol lactate (HALDOL) injection 5 mg  5 mg Intramuscular Q6H PRN    hydrOXYzine HCL (ATARAX) tablet 25 mg  25 mg Oral Q6H PRN    ibuprofen (MOTRIN) tablet 800 mg  800 mg Oral Q8H PRN    LORazepam (ATIVAN) 2 mg/mL injection 2 mg  2 mg Intramuscular Q6H PRN    LORazepam (ATIVAN) tablet 0 5 mg  0 5 mg Oral Q8H PRN    LORazepam (ATIVAN) tablet 1 mg  1 mg Oral BID    magnesium hydroxide (MILK OF MAGNESIA) 400 mg/5 mL oral suspension 30 mL  30 mL Oral Daily PRN    melatonin tablet 3 mg  3 mg Oral HS    OLANZapine (ZyPREXA) IM injection 10 mg  10 mg Intramuscular Q8H PRN    OLANZapine (ZyPREXA) tablet 10 mg  10 mg Oral Q8H PRN    pantoprazole (PROTONIX) EC tablet 20 mg  20 mg Oral BID    risperiDONE (RisperDAL M-TABS) dispersible tablet 1 mg  1 mg Oral Q3H PRN    traZODone (DESYREL) tablet 50 mg  50 mg Oral HS PRN    zolpidem (AMBIEN) tablet 10 mg  10 mg Oral HS       Behavioral Health Medications: all current active meds have been reviewed and continue current psychiatric medications  Vitals:  Vitals:    03/07/19 0715   BP: 126/58   Pulse: 73   Resp: 18   Temp: 97 8 °F (36 6 °C)   SpO2:        Laboratory results:    I have personally reviewed all pertinent laboratory/tests results    Most Recent Labs:   Lab Results   Component Value Date    WBC 7 47 03/01/2019    RBC 4 34 03/01/2019    HGB 13 9 03/01/2019    HCT 41 9 03/01/2019     03/01/2019    RDW 12 9 03/01/2019    NEUTROABS 3 32 03/01/2019    SODIUM 142 03/04/2019    K 4 1 03/04/2019     03/04/2019    CO2 25 03/04/2019    BUN 9 03/04/2019    CREATININE 0 77 03/04/2019    GLUC 100 03/04/2019    CALCIUM 8 9 03/04/2019    AST 6 03/04/2019    ALT 17 03/04/2019    ALKPHOS 53 03/04/2019    TP 6 8 03/04/2019    ALB 3 8 03/04/2019    TBILI 0 40 03/04/2019    CHOLESTEROL 152 03/01/2019    HDL 41 03/01/2019    TRIG 78 03/01/2019    LDLCALC 95 03/01/2019    NONHDLC 111 03/01/2019    VALPROICTOT 57 03/04/2019    ZOG8UNRWEIDM 2 573 02/28/2019    PREGSERUM Negative 02/28/2019    RPR Non-Reactive 02/28/2019    HGBA1C 5 5 03/01/2019     03/01/2019 Psychiatric Review of Systems:  Behavior over the last 24 hours:  unchanged  Sleep: normal  Appetite: normal  Medication side effects: No  ROS: no complaints    Mental Status Evaluation:  Appearance:  casually dressed   Behavior:  guarded   Speech:  soft   Mood:  anxious   Affect:  mood-congruent   Language sparse   Thought Process:  concrete and goal directed   Thought Content:  obsessions   Perceptual Disturbances: None   Risk Potential: Denied SI/HI  Potential for aggression: no   Sensorium:  person, place and time/date   Cognition:  grossly intact   Consciousness:  alert and awake    Recent and Remote Memory fair   Attention: attention span appeared shorter than expected for age   Insight:  partial   Judgment: partial   Gait/Station: normal gait/station and normal balance   Motor Activity: no abnormal movements     Progress Toward Goals: progressing slowly    Recommended Treatment: Continue with group therapy, milieu therapy and occupational therapy  1   Continue current medications  2  Disposition planning with tentative discharge tomorrow     Risks, benefits and possible side effects of Medications:   Risks, benefits, and possible side effects of medications explained to patient and patient verbalizes understanding        Maci Morse PA-C

## 2019-03-07 NOTE — PROGRESS NOTES
Pt slept majority of the night, waking briefly at times to ask what time it was however able to return to sleep soon after

## 2019-03-08 VITALS
WEIGHT: 201 LBS | SYSTOLIC BLOOD PRESSURE: 119 MMHG | OXYGEN SATURATION: 98 % | RESPIRATION RATE: 16 BRPM | BODY MASS INDEX: 34.31 KG/M2 | TEMPERATURE: 98.3 F | HEIGHT: 64 IN | DIASTOLIC BLOOD PRESSURE: 57 MMHG | HEART RATE: 80 BPM

## 2019-03-08 PROCEDURE — 99239 HOSP IP/OBS DSCHRG MGMT >30: CPT | Performed by: PSYCHIATRY & NEUROLOGY

## 2019-03-08 RX ADMIN — ALUMINUM HYDROXIDE, MAGNESIUM HYDROXIDE, AND SIMETHICONE 30 ML: 200; 200; 20 SUSPENSION ORAL at 12:23

## 2019-03-08 RX ADMIN — PANTOPRAZOLE SODIUM 20 MG: 20 TABLET, DELAYED RELEASE ORAL at 08:12

## 2019-03-08 RX ADMIN — DIVALPROEX SODIUM 500 MG: 500 TABLET, DELAYED RELEASE ORAL at 08:11

## 2019-03-08 RX ADMIN — LORAZEPAM 1 MG: 1 TABLET ORAL at 08:12

## 2019-03-08 NOTE — DISCHARGE INSTR - LAB
Contact Information: If you have any questions, concerns, pended studies, tests and/or procedures, or emergencies regarding your inpatient behavioral health visit  Please contact [facility] behavioral health unit [phone] and ask to speak to a , nurse or physician  A contact is available 24 hours/ 7 days a week at this number  Summary of Procedures Performed During your Stay:  Below is a list of major procedures performed during your hospital stay and a summary of results:  - No major procedures performed  Pending Studies (From admission, onward)    None        If studies are pending at discharge, follow up with your PCP and/or referring provider

## 2019-03-08 NOTE — PROGRESS NOTES
Currently resting in bed, reporting mild fatigue  Denies SI/HI or thoughts of self harm  Fluctuating anxiety and discussed healthy coping skills including deep breathing  Expressed readiness for discharge  No questions or concerns

## 2019-03-08 NOTE — CASE MANAGEMENT
CM met with Pt who verbalized readiness for discharge, but does still have some anxiety  CM reviewed all of her supports/appointments & Pt was agreeable  CM contacted Pt's mom, Lucina Pimentel, @ [phone] & left a message confirming discharge for today

## 2019-03-08 NOTE — DISCHARGE INSTRUCTIONS
Anxiety   WHAT YOU SHOULD KNOW:   Anxiety is a condition that causes you to feel excessive worry, uneasiness, or fear  Family or work stress, smoking, caffeine, and alcohol can increase your risk for anxiety  Certain medicines or health conditions can also increase your risk  Anxiety may begin gradually, and can become a long-term condition if it is not managed or treated  AFTER YOU LEAVE:   Medicines:   · Medicines  can help you feel more calm and relaxed, and decrease your symptoms  · Take your medicine as directed  Contact your healthcare provider if you think your medicine is not helping or if you have side effects  Tell him if you are allergic to any medicine  Keep a list of the medicines, vitamins, and herbs you take  Include the amounts, and when and why you take them  Bring the list or the pill bottles to follow-up visits  Carry your medicine list with you in case of an emergency  Follow up with your healthcare provider within 2 weeks or as directed:  Write down your questions so you remember to ask them during your visits  Manage anxiety:   · Go to counseling as directed  Cognitive behavioral therapy can help you understand and change how you react to events that trigger your symptoms  · Find ways to manage your symptoms  Activities such as exercise, meditation, or listening to music can help you relax  · Practice deep breathing  Breathing can change how your body reacts to stress  Focus on taking slow, deep breaths several times a day, or during an anxiety attack  Breathe in through your nose, and out through your mouth  · Avoid caffeine  Caffeine can make your symptoms worse  Avoid foods or drinks that are meant to increase your energy level  · Limit or avoid alcohol  Ask your healthcare provider if alcohol is safe for you  You may not be able to drink alcohol if you take certain anxiety or depression medicines  Limit alcohol to 1 drink per day if you are a woman   Limit alcohol to 2 drinks per day if you are a man  A drink of alcohol is 12 ounces of beer, 5 ounces of wine, or 1½ ounces of liquor  Contact your healthcare provider if:   · Your symptoms get worse or do not get better with treatment  · You think your medicine may be causing side effects  · Your anxiety keeps you from doing your regular daily activities  · You have new symptoms since your last visit  · You have questions or concerns about your condition or care  Seek care immediately or call 911 if:   · You have chest pain, tightness, or heaviness that may spread to your shoulders, arms, jaw, neck, or back  · You feel like hurting yourself or someone else  · You feel dizzy, lightheaded, or faint  © 2014 [address] is for End User's use only and may not be sold, redistributed or otherwise used for commercial purposes  All illustrations and images included in CareNotes® are the copyrighted property of A D A M , Inc  or [employer]  The above information is an  only  It is not intended as medical advice for individual conditions or treatments  Talk to your doctor, nurse or pharmacist before following any medical regimen to see if it is safe and effective for you  Bipolar Disorder   WHAT YOU NEED TO KNOW:   Bipolar disorder is a long-term chemical imbalance that causes rapid changes in mood and behavior  High moods are called lisa  Low moods are called depression  Sometimes you will feel manic and sometimes you will feel depressed  You can have alternating episodes of lisa and depression  This is called a mixed bipolar state  DISCHARGE INSTRUCTIONS:   Call 911 if:   · You think about hurting yourself or someone else  Contact your healthcare provider or psychiatrist if:   · You are having trouble managing your bipolar disorder  · You cannot sleep, or are sleeping all the time       · You cannot eat, or are eating more than usual     · You feel dizzy or your stomach is upset  · You cannot make it to your next meeting  · You have questions or concerns about your condition or care  Medicines:   · Medicines  may be given to help keep your mood stable, or to help you sleep  Changes in medicine are often needed as your bipolar disorder changes  · Take your medicine as directed  Contact your healthcare provider if you think your medicine is not helping or if you have side effects  Tell him or her if you are allergic to any medicine  Keep a list of the medicines, vitamins, and herbs you take  Include the amounts, and when and why you take them  Bring the list or the pill bottles to follow-up visits  Carry your medicine list with you in case of an emergency  Follow up with your healthcare provider or psychiatrist as directed:  Write down your questions so you remember to ask them during your visits  Manage bipolar disorder:  Watch for triggers of bipolar disorder symptoms, such as stress  Learn new ways to relax, such as deep breathing, to manage your stress  Tell someone if you feel a manic or depressive period might be coming on  Ask a friend or family member to help watch you for bipolar symptoms  Work to develop skills that will help you manage bipolar disorder  You may need to make lifestyle changes  Ask your healthcare provider or psychiatrist for resources  For support and more information:   · [address] [facility], [address] [facility], [address] [facility], [address]  [place] [address]   Phone: [phone]  Phone: [phone]  Web Address: [facility] [place]  · Depression and [address] ([facility])  [address] [place] [address], [address] [place] [address] , [address] [address]  Phone: [phone]  Web Address: [employer]  org   © 2017 [address] [facility] Information is for End User's use only and may not be sold, redistributed or otherwise used for commercial purposes  All illustrations and images included in CareNotes® are the copyrighted property of A D A M , Inc  or [employer] Norris  The above information is an  only  It is not intended as medical advice for individual conditions or treatments  Talk to your doctor, nurse or pharmacist before following any medical regimen to see if it is safe and effective for you

## 2019-03-08 NOTE — CASE MANAGEMENT
ESTELLA returned a phone call to Pt's mom, Leroy Haddad, @ [phone], who reported they were at the [facility] in [address] & they were saying they didn't have any medication for Pt  ESTELLA contacted [facility] @ [phone] & confirmed fax number [phone]; ESTELLA had faxed Pt's prescriptions on 3/7 @ 2:08 PM   CM spoke the pharmacist & re-faxed the prescriptions  ESTELLA called Yvrose back, who confirmed the pharmacist had said they would be ready in 1 hour  Yvrose reported that Pt is not doing well, & she is glad that the crisis team is schedule to be at their house at 5:30 to assess Pt

## 2019-03-08 NOTE — DISCHARGE SUMMARY
Discharge Summary - [address] 41 y o  female MRN: [id number]  Unit/Bed#: [facility] 260-01 Encounter: [id number]     Admission Date:   Admission Orders (From admission, onward)    Ordered        02/27/19 2344  Admit Patient to [facility] (use if patient is NOT changing campus)  Once     Order ID Start Status   [id number] 02/27/19 2345 Completed                    Discharge Date: 3/8/19    Attending Psychiatrist: Shashi Holliday MD    Reason for Admission/HPI:   History of Present Illness   Patient is a 21-year-old intellectually disabled female who was transferred from [address] [place] [address] due to patient and parents request   She reported ongoing psychiatric complaint of disruptive thought process, continued thoughts of death, and thoughts of wanting to die  She does have diagnosed Autism Spectrum disorder  On initial psychiatric evaluation patient was minimally cooperative and appeared distressed due to negative intrusive thoughts  She reported over the last few months increased depression with symptoms of lack of energy, anhedonia, hypersomnia, increased seclusive behavior, crying episodes, and suicidal thoughts  Patient also had increased irritability, impulsivity, and goal-directed behaviors over the last few weeks  Patient had circumstantial thought process, was angry, and labile during interview  During interview patient got loud and was perseverating on something she did wrong in the past and could not stop intrusive thoughts  She denied psychotic symptoms and terminated interview early due to not wanting to be asked about her intrusive thoughts  She had to be seen multiple times to finish her evaluation  She did have evident OCD with obsessive thought content  She did present with manic behavior    Patient does have prior inpatient psychiatric hospitalizations, denied prior suicide attempts, and does have outpatient psychiatrist with therapist     Psychosocial Stressors: chronic mental illness  Hospital Course:   Behavioral Health Medications:   current meds:   Current Facility-Administered Medications   Medication Dose Route Frequency    acetaminophen (TYLENOL) tablet 325 mg  325 mg Oral Q6H PRN    acetaminophen (TYLENOL) tablet 650 mg  650 mg Oral Q6H PRN    aluminum-magnesium hydroxide-simethicone (MYLANTA) 200-200-20 mg/5 mL oral suspension 30 mL  30 mL Oral Q4H PRN    benztropine (COGENTIN) injection 1 mg  1 mg Intramuscular Q6H PRN    benztropine (COGENTIN) tablet 1 mg  1 mg Oral Q6H PRN    dicyclomine (BENTYL) capsule 20 mg  20 mg Oral Q8H PRN    divalproex sodium (DEPAKOTE) EC tablet 500 mg  500 mg Oral QAM    divalproex sodium (DEPAKOTE) EC tablet 750 mg  750 mg Oral After Dinner    fluvoxaMINE (LUVOX) tablet 75 mg  75 mg Oral HS    haloperidol (HALDOL) tablet 5 mg  5 mg Oral Q6H PRN    haloperidol lactate (HALDOL) injection 5 mg  5 mg Intramuscular Q6H PRN    hydrOXYzine HCL (ATARAX) tablet 25 mg  25 mg Oral Q6H PRN    ibuprofen (MOTRIN) tablet 800 mg  800 mg Oral Q8H PRN    LORazepam (ATIVAN) 2 mg/mL injection 2 mg  2 mg Intramuscular Q6H PRN    LORazepam (ATIVAN) tablet 0 5 mg  0 5 mg Oral Q8H PRN    LORazepam (ATIVAN) tablet 1 mg  1 mg Oral BID    magnesium hydroxide (MILK OF MAGNESIA) 400 mg/5 mL oral suspension 30 mL  30 mL Oral Daily PRN    melatonin tablet 3 mg  3 mg Oral HS    OLANZapine (ZyPREXA) IM injection 10 mg  10 mg Intramuscular Q8H PRN    OLANZapine (ZyPREXA) tablet 10 mg  10 mg Oral Q8H PRN    pantoprazole (PROTONIX) EC tablet 20 mg  20 mg Oral BID    risperiDONE (RisperDAL M-TABS) dispersible tablet 1 mg  1 mg Oral Q3H PRN    traZODone (DESYREL) tablet 50 mg  50 mg Oral HS PRN    zolpidem (AMBIEN) tablet 10 mg  10 mg Oral HS       Patient was Transferred to [facility] inpatient psychiatric unit on voluntary 201 commitment for safety and stabilization    On admission patient was discontinued from Tegretol due to no poor response and replaced with Depakote EC 750mg QD for mood stabilization  Effexor was discontinued and was replaced with Luvox 50mg HS for OCD/depression  Risperdal was discontinued due to no psychotic symptoms  Seroquel was reduced to 50mg HS for insomnia  Ativan 1mg BID was added for anxiety management  Seroquel was ultimately discontinued  Ambien 10mg HS + Melatonin 3mg HS were then added for insomnia  Depakote was titrated to 500mg QD AM + 750mg QD PM   Depakote level on 3/4/19 was 57 and deemed therapeutic  Luvox was titrated to 75mg HS  She tolerated medications with no acute side effects  Her mood brightened slowly over the course of her treatment, and she was seen in [facility] interacting appropriately with peers  She was seen attending groups at times  She did not demonstrate dangerous behavior to self or others during her inpatient stay  Over course of hospitalization obsessive thought content decreased and mood began to stabilize  She did not endorse psychotic symptoms during course of hospitalization  On day of discharge patient had reduced depression, more controllable anxiety, denied psychosis, did not endorse criteria for lisa, denied delusional material, denied suicidal ideation, and denied homicidal ideation  Mental Status at time of Discharge:     Appearance:  casually dressed   Behavior:  cooperative, less guarded   Speech:  soft   Mood:  euthymic   Affect:  constricted   Thought Process:  concrete and goal directed   Thought Content:  obsessions   Perceptual Disturbances: None   Risk Potential: Denied SI/HI   Potential for aggression: no   Sensorium:  person, place and time/date   Cognition:  grossly intact   Consciousness:  alert and awake    Attention: attention span appeared shorter than expected for age   Insight:  partial   Judgment: improved   Gait/Station: normal gait/station and normal balance   Motor Activity: no abnormal movements       Discharge Diagnosis:   Bipolar disorder, current episode mixed, moderate   OCD (obsessive compulsive disorder)  Generalized anxiety disorder      Discharge Medications:  See after visit summary for reconciled discharge medications provided to patient and family  Discharge instructions/Information to patient and family:   See after visit summary for information provided to patient and family  Provisions for Follow-Up Care:  See after visit summary for information related to follow-up care and any pertinent home health orders  Discharge Statement   I spent 33 minutes discharging the patient  This time was spent on the day of discharge  I had direct contact with the patient on the day of discharge  On day of discharge patient had mental status exam performed, discharge instructions/medications reviewed, and outpatient planning discussed  She was given 1 month plus 1 refill of scripts        Anastacio Bar PA-C

## 2019-03-11 NOTE — CASE MANAGEMENT
CM received a voicemail from Pt's mom on 3/9 @ 6:00 PM & from pt on 3/10 @ 4:32 PM, stating she felt she needed to go to crisis residence or back to the hospital   ESTELLA contacted Pt's mom, Yvrose, @ [phone] & let her know that Pt, would need to outreach to the crisis hotline & they would need to come & do an assessment for Pt, to determine if she met criteria for crisis residence  Yvrose said that Pt was currently out with her worker from [facility], & she would let her know that is what they would need to do  Yvrose said that she called the crisis residence on Saturday, & they didn't have any openings & told them to call back on Monday

## 2020-09-11 ENCOUNTER — LAB REQUISITION (OUTPATIENT)
Dept: LAB | Age: 40
End: 2020-09-11

## 2020-09-11 DIAGNOSIS — Z11.3 ENCOUNTER FOR SCREENING FOR INFECTIONS WITH A PREDOMINANTLY SEXUAL MODE OF TRANSMISSION: ICD-10-CM

## 2020-09-11 PROCEDURE — PSEU9913 CHLAMYDIA/GONORRHEA BY NUCLEIC ACID AMPLIFICATION: Performed by: CLINICAL MEDICAL LABORATORY

## 2020-09-11 PROCEDURE — 87591 N.GONORRHOEAE DNA AMP PROB: CPT | Performed by: CLINICAL MEDICAL LABORATORY

## 2020-09-11 PROCEDURE — 87491 CHLMYD TRACH DNA AMP PROBE: CPT | Performed by: CLINICAL MEDICAL LABORATORY

## 2020-09-14 LAB
C TRACH RRNA CVX QL NAA+PROBE: NEGATIVE
Lab: NORMAL
N GONORRHOEA RRNA CVX QL NAA+PROBE: NEGATIVE

## 2021-02-01 ENCOUNTER — APPOINTMENT (OUTPATIENT)
Dept: CT IMAGING | Age: 41
End: 2021-02-01
Attending: EMERGENCY MEDICINE

## 2021-02-01 ENCOUNTER — HOSPITAL ENCOUNTER (EMERGENCY)
Age: 41
Discharge: HOME | End: 2021-02-01
Attending: EMERGENCY MEDICINE

## 2021-02-01 VITALS
TEMPERATURE: 98.1 F | BODY MASS INDEX: 28.32 KG/M2 | HEART RATE: 107 BPM | DIASTOLIC BLOOD PRESSURE: 70 MMHG | SYSTOLIC BLOOD PRESSURE: 137 MMHG | WEIGHT: 150 LBS | OXYGEN SATURATION: 96 % | RESPIRATION RATE: 18 BRPM | HEIGHT: 61 IN

## 2021-02-01 DIAGNOSIS — N12 PYELONEPHRITIS: ICD-10-CM

## 2021-02-01 DIAGNOSIS — M54.50 ACUTE RIGHT-SIDED LOW BACK PAIN WITHOUT SCIATICA: Primary | ICD-10-CM

## 2021-02-01 LAB
APPEARANCE UR: ABNORMAL
B-HCG UR QL: NEGATIVE
BACTERIA #/AREA URNS HPF: ABNORMAL /HPF
BILIRUB UR QL STRIP: NEGATIVE
COLOR UR: YELLOW
CREAT SERPL-MCNC: 1.1 MG/DL (ref 0.51–0.95)
GFR SERPLBLD BASED ON 1.73 SQ M-ARVRAT: 63 ML/MIN/1.73M2
GLUCOSE UR STRIP-MCNC: NEGATIVE MG/DL
HGB UR QL STRIP: ABNORMAL
HYALINE CASTS #/AREA URNS LPF: ABNORMAL /LPF
KETONES UR STRIP-MCNC: NEGATIVE MG/DL
LEUKOCYTE ESTERASE UR QL STRIP: NEGATIVE
NITRITE UR QL STRIP: POSITIVE
PH UR STRIP: 5.5 [PH] (ref 5–7)
PROT UR STRIP-MCNC: NEGATIVE MG/DL
RBC #/AREA URNS HPF: ABNORMAL /HPF
SP GR UR STRIP: 1.02 (ref 1–1.03)
SQUAMOUS #/AREA URNS HPF: ABNORMAL /HPF
UROBILINOGEN UR STRIP-MCNC: 0.2 MG/DL
WBC #/AREA URNS HPF: ABNORMAL /HPF

## 2021-02-01 PROCEDURE — 74176 CT ABD & PELVIS W/O CONTRAST: CPT | Performed by: RADIOLOGY

## 2021-02-01 PROCEDURE — 81001 URINALYSIS AUTO W/SCOPE: CPT | Performed by: EMERGENCY MEDICINE

## 2021-02-01 PROCEDURE — 36415 COLL VENOUS BLD VENIPUNCTURE: CPT

## 2021-02-01 PROCEDURE — 74176 CT ABD & PELVIS W/O CONTRAST: CPT

## 2021-02-01 PROCEDURE — 10002803 HB RX 637: Performed by: EMERGENCY MEDICINE

## 2021-02-01 PROCEDURE — 82565 ASSAY OF CREATININE: CPT

## 2021-02-01 PROCEDURE — 10004651 HB RX, NO CHARGE ITEM: Performed by: EMERGENCY MEDICINE

## 2021-02-01 PROCEDURE — 87088 URINE BACTERIA CULTURE: CPT | Performed by: EMERGENCY MEDICINE

## 2021-02-01 PROCEDURE — 96360 HYDRATION IV INFUSION INIT: CPT

## 2021-02-01 PROCEDURE — 10002807 HB RX 258: Performed by: EMERGENCY MEDICINE

## 2021-02-01 PROCEDURE — 81025 URINE PREGNANCY TEST: CPT | Performed by: EMERGENCY MEDICINE

## 2021-02-01 PROCEDURE — 99284 EMERGENCY DEPT VISIT MOD MDM: CPT

## 2021-02-01 PROCEDURE — 99284 EMERGENCY DEPT VISIT MOD MDM: CPT | Performed by: EMERGENCY MEDICINE

## 2021-02-01 PROCEDURE — 87086 URINE CULTURE/COLONY COUNT: CPT | Performed by: EMERGENCY MEDICINE

## 2021-02-01 RX ORDER — OXYCODONE HYDROCHLORIDE 5 MG/1
5 TABLET ORAL EVERY 6 HOURS PRN
Qty: 14 TABLET | Refills: 0 | Status: SHIPPED | OUTPATIENT
Start: 2021-02-01 | End: 2021-02-01 | Stop reason: SDUPTHER

## 2021-02-01 RX ORDER — CEPHALEXIN 500 MG/1
500 CAPSULE ORAL 3 TIMES DAILY
Qty: 30 CAPSULE | Refills: 0 | Status: SHIPPED | OUTPATIENT
Start: 2021-02-01 | End: 2021-02-01 | Stop reason: SDUPTHER

## 2021-02-01 RX ORDER — ACETAMINOPHEN 500 MG
1000 TABLET ORAL ONCE
Status: COMPLETED | OUTPATIENT
Start: 2021-02-01 | End: 2021-02-01

## 2021-02-01 RX ORDER — OXYCODONE HYDROCHLORIDE 5 MG/1
5 TABLET ORAL ONCE
Status: COMPLETED | OUTPATIENT
Start: 2021-02-01 | End: 2021-02-01

## 2021-02-01 RX ORDER — OXYCODONE HYDROCHLORIDE 5 MG/1
5 TABLET ORAL EVERY 6 HOURS PRN
Qty: 14 TABLET | Refills: 0 | Status: SHIPPED | OUTPATIENT
Start: 2021-02-01 | End: 2021-02-06

## 2021-02-01 RX ORDER — CEPHALEXIN 500 MG/1
500 CAPSULE ORAL 3 TIMES DAILY
Qty: 30 CAPSULE | Refills: 0 | Status: SHIPPED | OUTPATIENT
Start: 2021-02-01 | End: 2021-02-11

## 2021-02-01 RX ORDER — LIDOCAINE 50 MG/G
1 PATCH TOPICAL EVERY 24 HOURS
Qty: 10 PATCH | Refills: 0 | Status: SHIPPED | OUTPATIENT
Start: 2021-02-01 | End: 2021-02-11

## 2021-02-01 RX ORDER — LIDOCAINE 4 G/G
1 PATCH TOPICAL ONCE
Status: DISCONTINUED | OUTPATIENT
Start: 2021-02-01 | End: 2021-02-01 | Stop reason: HOSPADM

## 2021-02-01 RX ORDER — LIDOCAINE 50 MG/G
1 PATCH TOPICAL EVERY 24 HOURS
Qty: 10 PATCH | Refills: 0 | Status: SHIPPED | OUTPATIENT
Start: 2021-02-01 | End: 2021-02-01 | Stop reason: SDUPTHER

## 2021-02-01 RX ADMIN — SODIUM CHLORIDE 1000 ML: 9 INJECTION, SOLUTION INTRAVENOUS at 09:51

## 2021-02-01 RX ADMIN — ACETAMINOPHEN 1000 MG: 500 TABLET ORAL at 09:12

## 2021-02-01 RX ADMIN — OXYCODONE HYDROCHLORIDE 5 MG: 5 TABLET ORAL at 09:12

## 2021-02-01 RX ADMIN — LIDOCAINE 1 PATCH: 246 PATCH TOPICAL at 09:12

## 2021-02-01 ASSESSMENT — PAIN DESCRIPTION - PAIN TYPE: TYPE: ACUTE PAIN

## 2021-02-01 ASSESSMENT — ENCOUNTER SYMPTOMS
LIGHT-HEADEDNESS: 0
SHORTNESS OF BREATH: 0
FEVER: 0
BACK PAIN: 1
ABDOMINAL PAIN: 0

## 2021-02-01 ASSESSMENT — PAIN SCALES - GENERAL: PAINLEVEL_OUTOF10: 8

## 2021-02-02 LAB — BACTERIA UR CULT: ABNORMAL

## 2021-07-15 ENCOUNTER — LAB REQUISITION (OUTPATIENT)
Dept: LAB | Age: 41
End: 2021-07-15

## 2021-07-15 DIAGNOSIS — R30.0 DYSURIA: ICD-10-CM

## 2021-07-15 PROCEDURE — 87088 URINE BACTERIA CULTURE: CPT | Performed by: CLINICAL MEDICAL LABORATORY

## 2021-07-15 PROCEDURE — 87086 URINE CULTURE/COLONY COUNT: CPT | Performed by: CLINICAL MEDICAL LABORATORY

## 2021-07-15 PROCEDURE — PSEU9005 URINE, BACTERIAL CULTURE: Performed by: CLINICAL MEDICAL LABORATORY

## 2021-07-15 PROCEDURE — 87186 SC STD MICRODIL/AGAR DIL: CPT | Performed by: CLINICAL MEDICAL LABORATORY

## 2021-07-17 LAB — BACTERIA UR CULT: ABNORMAL

## 2021-07-22 ENCOUNTER — LAB REQUISITION (OUTPATIENT)
Dept: LAB | Age: 41
End: 2021-07-22

## 2021-07-22 DIAGNOSIS — R63.5 ABNORMAL WEIGHT GAIN: ICD-10-CM

## 2021-07-22 LAB — CORTIS AM PEAK SERPL-MCNC: 1.1 MCG/DL (ref 5.2–22.5)

## 2021-07-22 PROCEDURE — PSEU8188 CORTISOL, AM: Performed by: CLINICAL MEDICAL LABORATORY

## 2021-07-22 PROCEDURE — 82533 TOTAL CORTISOL: CPT | Performed by: CLINICAL MEDICAL LABORATORY

## 2021-10-15 ENCOUNTER — LAB REQUISITION (OUTPATIENT)
Dept: LAB | Age: 41
End: 2021-10-15

## 2021-10-15 DIAGNOSIS — N30.01 ACUTE CYSTITIS WITH HEMATURIA: ICD-10-CM

## 2021-10-15 DIAGNOSIS — R35.0 FREQUENCY OF MICTURITION: ICD-10-CM

## 2021-10-15 PROCEDURE — 87086 URINE CULTURE/COLONY COUNT: CPT | Performed by: CLINICAL MEDICAL LABORATORY

## 2021-10-15 PROCEDURE — 87186 SC STD MICRODIL/AGAR DIL: CPT | Performed by: CLINICAL MEDICAL LABORATORY

## 2021-10-15 PROCEDURE — 87088 URINE BACTERIA CULTURE: CPT | Performed by: CLINICAL MEDICAL LABORATORY

## 2021-10-15 PROCEDURE — PSEU9005 URINE, BACTERIAL CULTURE: Performed by: CLINICAL MEDICAL LABORATORY

## 2021-10-17 LAB — BACTERIA UR CULT: ABNORMAL

## 2021-10-28 ENCOUNTER — LAB REQUISITION (OUTPATIENT)
Dept: LAB | Age: 41
End: 2021-10-28

## 2021-10-28 DIAGNOSIS — N18.31 CHRONIC KIDNEY DISEASE, STAGE 3A (CMD): ICD-10-CM

## 2021-10-28 DIAGNOSIS — Z52.4 KIDNEY DONOR: ICD-10-CM

## 2021-10-28 PROCEDURE — 87086 URINE CULTURE/COLONY COUNT: CPT | Performed by: CLINICAL MEDICAL LABORATORY

## 2021-10-28 PROCEDURE — 87186 SC STD MICRODIL/AGAR DIL: CPT | Performed by: CLINICAL MEDICAL LABORATORY

## 2021-10-28 PROCEDURE — PSEU9005 URINE, BACTERIAL CULTURE: Performed by: CLINICAL MEDICAL LABORATORY

## 2021-10-28 PROCEDURE — 87088 URINE BACTERIA CULTURE: CPT | Performed by: CLINICAL MEDICAL LABORATORY

## 2021-10-30 LAB — BACTERIA UR CULT: ABNORMAL

## 2021-12-21 ENCOUNTER — LAB REQUISITION (OUTPATIENT)
Dept: LAB | Age: 41
End: 2021-12-21

## 2021-12-21 DIAGNOSIS — R31.29 OTHER MICROSCOPIC HEMATURIA: ICD-10-CM

## 2021-12-21 PROCEDURE — 87086 URINE CULTURE/COLONY COUNT: CPT | Performed by: CLINICAL MEDICAL LABORATORY

## 2021-12-21 PROCEDURE — PSEU9005 URINE, BACTERIAL CULTURE: Performed by: CLINICAL MEDICAL LABORATORY

## 2021-12-23 LAB — BACTERIA UR CULT: NORMAL

## 2022-01-04 PROCEDURE — 88175 CYTOPATH C/V AUTO FLUID REDO: CPT | Performed by: CLINICAL MEDICAL LABORATORY

## 2022-01-04 PROCEDURE — PSEU9939 HPV, HIGH RISK: Performed by: CLINICAL MEDICAL LABORATORY

## 2022-01-04 PROCEDURE — 87624 HPV HI-RISK TYP POOLED RSLT: CPT | Performed by: CLINICAL MEDICAL LABORATORY

## 2022-01-05 ENCOUNTER — LAB REQUISITION (OUTPATIENT)
Dept: LAB | Age: 42
End: 2022-01-05

## 2022-01-05 DIAGNOSIS — Z52.4 KIDNEY DONOR: ICD-10-CM

## 2022-01-05 DIAGNOSIS — Z01.419 ENCOUNTER FOR GYNECOLOGICAL EXAMINATION (GENERAL) (ROUTINE) WITHOUT ABNORMAL FINDINGS: ICD-10-CM

## 2022-01-05 DIAGNOSIS — N18.31 CHRONIC KIDNEY DISEASE, STAGE 3A (CMD): ICD-10-CM

## 2022-01-05 DIAGNOSIS — Z90.5 ACQUIRED ABSENCE OF KIDNEY: ICD-10-CM

## 2022-01-05 DIAGNOSIS — R31.29 OTHER MICROSCOPIC HEMATURIA: ICD-10-CM

## 2022-01-05 PROCEDURE — 82610 CYSTATIN C: CPT | Performed by: CLINICAL MEDICAL LABORATORY

## 2022-01-05 PROCEDURE — PSEU8567 MICROALBUMIN URINE RANDOM: Performed by: CLINICAL MEDICAL LABORATORY

## 2022-01-05 PROCEDURE — 82043 UR ALBUMIN QUANTITATIVE: CPT | Performed by: CLINICAL MEDICAL LABORATORY

## 2022-01-05 PROCEDURE — 82570 ASSAY OF URINE CREATININE: CPT | Performed by: CLINICAL MEDICAL LABORATORY

## 2022-01-05 PROCEDURE — PSEU9490 CYSTATIN C: Performed by: CLINICAL MEDICAL LABORATORY

## 2022-01-06 LAB
CREAT UR-MCNC: 189 MG/DL
MICROALBUMIN UR-MCNC: 1.87 MG/DL
MICROALBUMIN/CREAT UR: 9.9 MG/G

## 2022-01-10 LAB
CASE RPRT: NORMAL
CLINICAL INFO: NORMAL
CYTOLOGY CVX/VAG STUDY: NORMAL
HPV16+18+45 E6+E7MRNA CVX NAA+PROBE: NEGATIVE
Lab: NORMAL
PAP EDUCATIONAL NOTE: NORMAL
SPECIMEN ADEQUACY: NORMAL

## 2022-01-11 LAB
CYSTATIN C SERPL-MCNC: 0.8 MG/L (ref 0.5–1.2)
GFR/BSA.PRED SERPLBLD CYS-BASED-ARV: 105 ML/MIN/BSA

## 2022-09-06 NOTE — STUDENT
"Medical Student Note For Educational Purposes Only - Do Not Use For Coding Or Billing.    Psychiatry Progress Note    Chief Complaint: Worsening anxiety and depression    Interval History: Spoke to Erika at noon. Reports anxiety is 5/10, down from 6/10 yesterday. Reports mood is \"I feel crabby and frustrated\", but laughed and smiled several times during the interview appropriately. Continues to feel tired and sedated during the daytime. Had to excuse herself from the group session due to fatigue. Didn't sleep fully last night because she was waiting for her CPAP to be approved so she stayed awake until the technician came. The CPAP mask was tight on her face and she reports a mild headache. Frustrated that her laundry was lost yesterday (one sock and a tank top). Denies SI, changes in appetite.     Discussed with her the possibility of reducing her ativan frequency, which she was very agreeable to. She requested for social work to help her find a new outpatient psychiatrist again today. She is considering a Dr. Shepherd that her friend Alonzo goes to, but is willing to look at other options in the area as well.    RN report: behavioral specialist came yesterday, patient using light box, still doesn't feel rested, used CPAP overnight  ROS: Denies any new side effects from medications. Denies any trouble with sleep or appetite.       Vital Signs for the last 24 hours:  Temp:  [36.6 °C (97.8 °F)-37 °C (98.6 °F)] 36.6 °C (97.8 °F)  Heart Rate:  [83-93] 88  Resp:  [16-20] 16  BP: (118-134)/(58-87) 118/58    Labs:  No new labs.    Mental Status Exam:  Appearance: Obese  woman, appears stated age, appropriately dressed in casual attire, well groomed, looks fatigued  Gait and Motor: normal, no PMA/PMR  Behavior: Engaged, cooperative, maintained good eye contact  Speech: normal rate/rhythm/volume  Mood: \"I feel crabby and frustrated\"  Affect: full range  Associations: coherent  Thought Process: linear, logical " How Severe Is Your Skin Lesion?: moderate goal-directed   Thought Content: appropriate to situation, No AH/VH/delusions  Suicidality/Homicidality: denies  Judgement/Insight: good  Orientation: AAOx3    Assessment/Plan  Erika Vaughan is a 38 y.o. woman with a PMH of bipolar disorder and autism presenting with worsening anxiety and panic attacks.     Her mood and anxiety has continued to improve and she is smiling and laughing appropriately. She is still frustrated by her fatigue and sleepiness during the day. Etiologies could include ritalin withdrawal, Seroquel dosage, Ativan usage, lack of proper CPAP usage and sleep.    Anxiety and panic attacks  - Continue risperdal 0.5mg bid for mood stability  - Continue tegretol 200mg tid  - Continue effexor 37.5mg qday  - Continue paxil taper at 10mg bid due to ineffectiveness, fully discontinue tomorrow 12/8  - Hold ritalin due to activation which could be contributing to panic attacks  - Light therapy in the morning    Daytime sleepiness  - Consider stopping Seroquel if it is no longer needed for insomnia  - Discontinue morning dose of 0.5 mg Ativan which could be causing oversedation      Juan Jose Armando, MS3  [facility]  Psychiatry  Date: 12/7/2018

## 2022-10-11 ENCOUNTER — EXTERNAL RECORD (OUTPATIENT)
Dept: OTHER | Age: 42
End: 2022-10-11

## 2022-11-03 ENCOUNTER — TRANSCRIBE ORDERS (OUTPATIENT)
Dept: SLEEP MEDICINE | Facility: HOSPITAL | Age: 42
End: 2022-11-03

## 2022-11-03 DIAGNOSIS — G47.33 OBSTRUCTIVE SLEEP APNEA (ADULT) (PEDIATRIC): Primary | ICD-10-CM

## 2022-11-30 ENCOUNTER — HOSPITAL ENCOUNTER (OUTPATIENT)
Dept: SLEEP MEDICINE | Facility: HOSPITAL | Age: 42
Discharge: HOME | End: 2022-11-30
Attending: STUDENT IN AN ORGANIZED HEALTH CARE EDUCATION/TRAINING PROGRAM
Payer: MEDICARE

## 2022-11-30 DIAGNOSIS — G47.33 OBSTRUCTIVE SLEEP APNEA (ADULT) (PEDIATRIC): ICD-10-CM

## 2022-11-30 PROCEDURE — 95810 POLYSOM 6/> YRS 4/> PARAM: CPT

## 2022-12-01 VITALS — WEIGHT: 265 LBS | BODY MASS INDEX: 45.24 KG/M2 | OXYGEN SATURATION: 94 % | HEIGHT: 64 IN

## 2023-01-04 ENCOUNTER — TELEPHONE (OUTPATIENT)
Dept: SCHEDULING | Age: 43
End: 2023-01-04

## 2023-03-08 PROCEDURE — PSEU8567 MICROALBUMIN URINE RANDOM: Performed by: CLINICAL MEDICAL LABORATORY

## 2023-03-08 PROCEDURE — 82570 ASSAY OF URINE CREATININE: CPT | Performed by: CLINICAL MEDICAL LABORATORY

## 2023-03-08 PROCEDURE — 82043 UR ALBUMIN QUANTITATIVE: CPT | Performed by: CLINICAL MEDICAL LABORATORY

## 2023-03-09 ENCOUNTER — LAB REQUISITION (OUTPATIENT)
Dept: LAB | Age: 43
End: 2023-03-09

## 2023-03-09 DIAGNOSIS — R31.29 OTHER MICROSCOPIC HEMATURIA: ICD-10-CM

## 2023-03-09 DIAGNOSIS — Z52.4 KIDNEY DONOR: ICD-10-CM

## 2023-03-09 DIAGNOSIS — N18.31 CHRONIC KIDNEY DISEASE, STAGE 3A (CMD): ICD-10-CM

## 2023-03-09 DIAGNOSIS — Z90.5 ACQUIRED ABSENCE OF KIDNEY: ICD-10-CM

## 2023-03-09 LAB
CREAT UR-MCNC: 121 MG/DL
MICROALBUMIN UR-MCNC: 1.81 MG/DL
MICROALBUMIN/CREAT UR: 15 MG/G

## 2023-04-01 ENCOUNTER — NURSE TRIAGE (OUTPATIENT)
Dept: TELEHEALTH | Age: 43
End: 2023-04-01

## 2023-05-25 ENCOUNTER — OFFICE VISIT (OUTPATIENT)
Dept: FAMILY MEDICINE | Age: 43
End: 2023-05-25

## 2023-05-25 ENCOUNTER — LAB SERVICES (OUTPATIENT)
Dept: LAB | Age: 43
End: 2023-05-25

## 2023-05-25 VITALS
BODY MASS INDEX: 26.6 KG/M2 | HEART RATE: 97 BPM | OXYGEN SATURATION: 98 % | WEIGHT: 150.13 LBS | DIASTOLIC BLOOD PRESSURE: 82 MMHG | SYSTOLIC BLOOD PRESSURE: 112 MMHG | HEIGHT: 63 IN

## 2023-05-25 DIAGNOSIS — N62 GYNECOMASTIA: ICD-10-CM

## 2023-05-25 DIAGNOSIS — Z90.5 S/P NEPHRECTOMY: ICD-10-CM

## 2023-05-25 DIAGNOSIS — Z52.4 DONOR, KIDNEY: ICD-10-CM

## 2023-05-25 DIAGNOSIS — R35.0 INCREASED URINARY FREQUENCY: ICD-10-CM

## 2023-05-25 DIAGNOSIS — L65.9 HAIR LOSS: Primary | ICD-10-CM

## 2023-05-25 DIAGNOSIS — R31.29 HEMATURIA, MICROSCOPIC: ICD-10-CM

## 2023-05-25 DIAGNOSIS — R09.89 THROAT CLEARING: ICD-10-CM

## 2023-05-25 DIAGNOSIS — L65.9 HAIR LOSS: ICD-10-CM

## 2023-05-25 DIAGNOSIS — G89.29 CHRONIC MID BACK PAIN: ICD-10-CM

## 2023-05-25 DIAGNOSIS — M54.9 CHRONIC MID BACK PAIN: ICD-10-CM

## 2023-05-25 DIAGNOSIS — F51.04 PSYCHOPHYSIOLOGICAL INSOMNIA: ICD-10-CM

## 2023-05-25 LAB
ALBUMIN SERPL-MCNC: 4.6 G/DL (ref 3.6–5.1)
ALBUMIN/GLOB SERPL: 1.6 {RATIO} (ref 1–2.4)
ALP SERPL-CCNC: 57 UNITS/L (ref 45–117)
ALT SERPL-CCNC: 27 UNITS/L
ANION GAP SERPL CALC-SCNC: 14 MMOL/L (ref 7–19)
APPEARANCE UR: ABNORMAL
AST SERPL-CCNC: 17 UNITS/L
BACTERIA #/AREA URNS HPF: ABNORMAL /HPF
BASOPHILS # BLD: 0.1 K/MCL (ref 0–0.3)
BASOPHILS NFR BLD: 1 %
BILIRUB SERPL-MCNC: 0.6 MG/DL (ref 0.2–1)
BILIRUB UR QL STRIP: NEGATIVE
BUN SERPL-MCNC: 17 MG/DL (ref 6–20)
BUN/CREAT SERPL: 16 (ref 7–25)
CALCIUM SERPL-MCNC: 9.8 MG/DL (ref 8.4–10.2)
CHLORIDE SERPL-SCNC: 106 MMOL/L (ref 97–110)
CO2 SERPL-SCNC: 24 MMOL/L (ref 21–32)
COLOR UR: YELLOW
CREAT SERPL-MCNC: 1.04 MG/DL (ref 0.51–0.95)
DEPRECATED RDW RBC: 47.5 FL (ref 39–50)
EOSINOPHIL # BLD: 0.1 K/MCL (ref 0–0.5)
EOSINOPHIL NFR BLD: 1 %
ERYTHROCYTE [DISTWIDTH] IN BLOOD: 13.1 % (ref 11–15)
FASTING DURATION TIME PATIENT: ABNORMAL H
FERRITIN SERPL-MCNC: 138 NG/ML (ref 8–252)
GFR SERPLBLD BASED ON 1.73 SQ M-ARVRAT: 69 ML/MIN
GLOBULIN SER-MCNC: 2.8 G/DL (ref 2–4)
GLUCOSE SERPL-MCNC: 101 MG/DL (ref 70–99)
GLUCOSE UR STRIP-MCNC: NEGATIVE MG/DL
HCT VFR BLD CALC: 42.1 % (ref 36–46.5)
HGB BLD-MCNC: 14.2 G/DL (ref 12–15.5)
HGB UR QL STRIP: ABNORMAL
HYALINE CASTS #/AREA URNS LPF: ABNORMAL /LPF
IMM GRANULOCYTES # BLD AUTO: 0 K/MCL (ref 0–0.2)
IMM GRANULOCYTES # BLD: 0 %
KETONES UR STRIP-MCNC: NEGATIVE MG/DL
LEUKOCYTE ESTERASE UR QL STRIP: NEGATIVE
LYMPHOCYTES # BLD: 2.6 K/MCL (ref 1–4.8)
LYMPHOCYTES NFR BLD: 34 %
MCH RBC QN AUTO: 33.2 PG (ref 26–34)
MCHC RBC AUTO-ENTMCNC: 33.7 G/DL (ref 32–36.5)
MCV RBC AUTO: 98.4 FL (ref 78–100)
MONOCYTES # BLD: 0.7 K/MCL (ref 0.3–0.9)
MONOCYTES NFR BLD: 9 %
MUCOUS THREADS URNS QL MICRO: PRESENT
NEUTROPHILS # BLD: 4.3 K/MCL (ref 1.8–7.7)
NEUTROPHILS NFR BLD: 55 %
NITRITE UR QL STRIP: NEGATIVE
PH UR STRIP: 6.5 [PH] (ref 5–7)
PLATELET # BLD AUTO: 323 K/MCL (ref 140–450)
POTASSIUM SERPL-SCNC: 4.5 MMOL/L (ref 3.4–5.1)
PROT SERPL-MCNC: 7.4 G/DL (ref 6.4–8.2)
PROT UR STRIP-MCNC: NEGATIVE MG/DL
RBC # BLD: 4.28 MIL/MCL (ref 4–5.2)
RBC #/AREA URNS HPF: ABNORMAL /HPF
SODIUM SERPL-SCNC: 139 MMOL/L (ref 135–145)
SP GR UR STRIP: 1.02 (ref 1–1.03)
SQUAMOUS #/AREA URNS HPF: ABNORMAL /HPF
TSH SERPL-ACNC: 1.3 MCUNITS/ML (ref 0.35–5)
UROBILINOGEN UR STRIP-MCNC: 0.2 MG/DL
WBC # BLD: 7.7 K/MCL (ref 4.2–11)
WBC #/AREA URNS HPF: ABNORMAL /HPF

## 2023-05-25 PROCEDURE — 82728 ASSAY OF FERRITIN: CPT | Performed by: CLINICAL MEDICAL LABORATORY

## 2023-05-25 PROCEDURE — 99000 SPECIMEN HANDLING OFFICE-LAB: CPT | Performed by: INTERNAL MEDICINE

## 2023-05-25 PROCEDURE — 99204 OFFICE O/P NEW MOD 45 MIN: CPT | Performed by: FAMILY MEDICINE

## 2023-05-25 PROCEDURE — 85025 COMPLETE CBC W/AUTO DIFF WBC: CPT | Performed by: INTERNAL MEDICINE

## 2023-05-25 PROCEDURE — 84443 ASSAY THYROID STIM HORMONE: CPT | Performed by: CLINICAL MEDICAL LABORATORY

## 2023-05-25 PROCEDURE — 81001 URINALYSIS AUTO W/SCOPE: CPT | Performed by: INTERNAL MEDICINE

## 2023-05-25 PROCEDURE — 80053 COMPREHEN METABOLIC PANEL: CPT | Performed by: CLINICAL MEDICAL LABORATORY

## 2023-05-25 RX ORDER — TRAMADOL HYDROCHLORIDE 50 MG/1
50 TABLET ORAL EVERY 6 HOURS PRN
COMMUNITY
Start: 2023-04-01 | End: 2023-06-07 | Stop reason: ALTCHOICE

## 2023-05-25 RX ORDER — PREDNISONE 20 MG/1
TABLET ORAL
COMMUNITY
Start: 2023-04-01 | End: 2023-06-07 | Stop reason: ALTCHOICE

## 2023-05-25 RX ORDER — MEDROXYPROGESTERONE ACETATE 150 MG/ML
INJECTION, SUSPENSION INTRAMUSCULAR
COMMUNITY
Start: 2023-04-03

## 2023-05-25 RX ORDER — FLUOXETINE HYDROCHLORIDE 20 MG/1
1 CAPSULE ORAL DAILY
COMMUNITY
Start: 2023-02-07 | End: 2023-06-07 | Stop reason: SDUPTHER

## 2023-05-25 RX ORDER — LISDEXAMFETAMINE DIMESYLATE 40 MG/1
40 CAPSULE ORAL DAILY
COMMUNITY
Start: 2023-05-10

## 2023-05-25 RX ORDER — MEDROXYPROGESTERONE ACETATE 150 MG/ML
INJECTION, SUSPENSION INTRAMUSCULAR
COMMUNITY
Start: 2019-05-25 | End: 2023-06-07 | Stop reason: SDUPTHER

## 2023-05-25 RX ORDER — HYDROXYZINE HYDROCHLORIDE 25 MG/1
TABLET, FILM COATED ORAL
COMMUNITY
Start: 2023-04-11

## 2023-05-25 RX ORDER — MEDROXYPROGESTERONE ACETATE 150 MG/ML
150 INJECTION, SUSPENSION INTRAMUSCULAR
COMMUNITY
Start: 2023-01-17 | End: 2023-06-07 | Stop reason: SDUPTHER

## 2023-05-25 RX ORDER — VERAPAMIL HYDROCHLORIDE 180 MG/1
CAPSULE, EXTENDED RELEASE ORAL
COMMUNITY
Start: 2023-03-09 | End: 2023-06-07 | Stop reason: SDUPTHER

## 2023-05-25 RX ORDER — LISDEXAMFETAMINE DIMESYLATE CAPSULES 40 MG/1
CAPSULE ORAL
COMMUNITY
Start: 2023-01-11

## 2023-05-25 RX ORDER — FLUTICASONE PROPIONATE 50 MCG
2 SPRAY, SUSPENSION (ML) NASAL DAILY
Qty: 16 G | Refills: 1 | Status: SHIPPED | OUTPATIENT
Start: 2023-05-25

## 2023-05-25 RX ORDER — FLUTICASONE PROPIONATE 50 MCG
SPRAY, SUSPENSION (ML) NASAL
Qty: 48 G | OUTPATIENT
Start: 2023-05-25

## 2023-05-25 ASSESSMENT — PATIENT HEALTH QUESTIONNAIRE - PHQ9
2. FEELING DOWN, DEPRESSED OR HOPELESS: NOT AT ALL
SUM OF ALL RESPONSES TO PHQ9 QUESTIONS 1 AND 2: 0
1. LITTLE INTEREST OR PLEASURE IN DOING THINGS: NOT AT ALL
SUM OF ALL RESPONSES TO PHQ9 QUESTIONS 1 AND 2: 0
CLINICAL INTERPRETATION OF PHQ2 SCORE: NO FURTHER SCREENING NEEDED

## 2023-05-26 ENCOUNTER — TELEPHONE (OUTPATIENT)
Dept: FAMILY MEDICINE | Age: 43
End: 2023-05-26

## 2023-06-07 PROBLEM — Z90.5 S/P NEPHRECTOMY: Status: ACTIVE | Noted: 2018-11-08

## 2023-06-07 PROBLEM — Z87.440 HISTORY OF RECURRENT UTI (URINARY TRACT INFECTION): Status: ACTIVE | Noted: 2021-12-21

## 2023-06-07 PROBLEM — R09.89 THROAT CLEARING: Status: ACTIVE | Noted: 2022-05-25

## 2023-06-07 PROBLEM — Z52.4 DONOR, KIDNEY: Status: ACTIVE | Noted: 2018-09-20

## 2023-09-05 ENCOUNTER — NURSE TRIAGE (OUTPATIENT)
Dept: TELEHEALTH | Age: 43
End: 2023-09-05

## 2023-11-29 ENCOUNTER — APPOINTMENT (OUTPATIENT)
Dept: FAMILY MEDICINE | Age: 43
End: 2023-11-29

## 2023-11-29 VITALS
OXYGEN SATURATION: 99 % | DIASTOLIC BLOOD PRESSURE: 80 MMHG | BODY MASS INDEX: 25.39 KG/M2 | WEIGHT: 143.3 LBS | HEART RATE: 98 BPM | SYSTOLIC BLOOD PRESSURE: 118 MMHG | HEIGHT: 63 IN

## 2023-11-29 DIAGNOSIS — F90.2 ATTENTION DEFICIT HYPERACTIVITY DISORDER (ADHD), COMBINED TYPE: Primary | ICD-10-CM

## 2023-11-29 DIAGNOSIS — Z90.5 S/P NEPHRECTOMY: ICD-10-CM

## 2023-11-29 DIAGNOSIS — F41.9 ANXIETY: ICD-10-CM

## 2023-11-29 PROCEDURE — 99214 OFFICE O/P EST MOD 30 MIN: CPT | Performed by: FAMILY MEDICINE

## 2023-11-29 RX ORDER — LISDEXAMFETAMINE DIMESYLATE CAPSULES 40 MG/1
40 CAPSULE ORAL EVERY MORNING
Qty: 30 CAPSULE | Refills: 0 | Status: SHIPPED | OUTPATIENT
Start: 2023-11-29

## 2023-11-30 ENCOUNTER — TELEPHONE (OUTPATIENT)
Dept: FAMILY MEDICINE | Age: 43
End: 2023-11-30

## 2023-11-30 RX ORDER — LISDEXAMFETAMINE DIMESYLATE CAPSULES 40 MG/1
40 CAPSULE ORAL EVERY MORNING
Qty: 30 CAPSULE | Refills: 0 | Status: CANCELLED | OUTPATIENT
Start: 2023-11-30

## 2024-01-09 RX ORDER — LISDEXAMFETAMINE DIMESYLATE CAPSULES 40 MG/1
40 CAPSULE ORAL EVERY MORNING
Qty: 30 CAPSULE | Refills: 0 | Status: SHIPPED | OUTPATIENT
Start: 2024-01-09 | End: 2024-01-10 | Stop reason: SDUPTHER

## 2024-01-10 ENCOUNTER — TELEPHONE (OUTPATIENT)
Dept: FAMILY MEDICINE | Age: 44
End: 2024-01-10

## 2024-01-10 DIAGNOSIS — F90.2 ATTENTION DEFICIT HYPERACTIVITY DISORDER (ADHD), COMBINED TYPE: Primary | ICD-10-CM

## 2024-01-10 RX ORDER — LISDEXAMFETAMINE DIMESYLATE CAPSULES 40 MG/1
40 CAPSULE ORAL EVERY MORNING
Qty: 30 CAPSULE | Refills: 0 | Status: SHIPPED | OUTPATIENT
Start: 2024-01-10

## 2024-02-08 DIAGNOSIS — F90.2 ATTENTION DEFICIT HYPERACTIVITY DISORDER (ADHD), COMBINED TYPE: ICD-10-CM

## 2024-02-08 RX ORDER — LISDEXAMFETAMINE DIMESYLATE CAPSULES 40 MG/1
40 CAPSULE ORAL EVERY MORNING
Qty: 30 CAPSULE | Refills: 0 | Status: SHIPPED | OUTPATIENT
Start: 2024-02-08

## 2024-04-04 DIAGNOSIS — F90.2 ATTENTION DEFICIT HYPERACTIVITY DISORDER (ADHD), COMBINED TYPE: ICD-10-CM

## 2024-04-04 RX ORDER — LISDEXAMFETAMINE DIMESYLATE CAPSULES 40 MG/1
40 CAPSULE ORAL EVERY MORNING
Qty: 30 CAPSULE | Refills: 0 | Status: SHIPPED | OUTPATIENT
Start: 2024-04-04

## 2024-04-21 ENCOUNTER — LAB REQUISITION (OUTPATIENT)
Dept: LAB | Age: 44
End: 2024-04-21

## 2024-04-21 DIAGNOSIS — N30.01 ACUTE CYSTITIS WITH HEMATURIA: ICD-10-CM

## 2024-04-21 PROCEDURE — 87086 URINE CULTURE/COLONY COUNT: CPT | Performed by: CLINICAL MEDICAL LABORATORY

## 2024-04-21 PROCEDURE — PSEU9005 URINE, BACTERIAL CULTURE: Performed by: CLINICAL MEDICAL LABORATORY

## 2024-04-21 PROCEDURE — 87077 CULTURE AEROBIC IDENTIFY: CPT | Performed by: CLINICAL MEDICAL LABORATORY

## 2024-04-21 PROCEDURE — 87186 SC STD MICRODIL/AGAR DIL: CPT | Performed by: CLINICAL MEDICAL LABORATORY

## 2024-04-23 LAB — BACTERIA UR CULT: ABNORMAL

## 2024-05-29 ENCOUNTER — APPOINTMENT (OUTPATIENT)
Dept: FAMILY MEDICINE | Age: 44
End: 2024-05-29

## 2024-05-29 DIAGNOSIS — F90.2 ATTENTION DEFICIT HYPERACTIVITY DISORDER (ADHD), COMBINED TYPE: ICD-10-CM

## 2024-05-29 RX ORDER — LISDEXAMFETAMINE DIMESYLATE 40 MG/1
40 CAPSULE ORAL EVERY MORNING
Qty: 30 CAPSULE | Refills: 0 | OUTPATIENT
Start: 2024-05-29

## 2024-05-30 ENCOUNTER — TELEPHONE (OUTPATIENT)
Dept: FAMILY MEDICINE | Age: 44
End: 2024-05-30

## 2024-06-26 ENCOUNTER — LAB REQUISITION (OUTPATIENT)
Dept: LAB | Age: 44
End: 2024-06-26

## 2024-06-26 DIAGNOSIS — M54.50 LOW BACK PAIN, UNSPECIFIED: ICD-10-CM

## 2024-06-26 DIAGNOSIS — R82.90 UNSPECIFIED ABNORMAL FINDINGS IN URINE: ICD-10-CM

## 2024-06-26 PROCEDURE — 87086 URINE CULTURE/COLONY COUNT: CPT | Performed by: CLINICAL MEDICAL LABORATORY

## 2024-06-26 PROCEDURE — PSEU9005 URINE, BACTERIAL CULTURE: Performed by: CLINICAL MEDICAL LABORATORY

## 2024-06-27 LAB — BACTERIA UR CULT: NORMAL

## 2024-07-26 ENCOUNTER — LAB REQUISITION (OUTPATIENT)
Dept: LAB | Age: 44
End: 2024-07-26

## 2024-07-26 DIAGNOSIS — Z79.899 OTHER LONG TERM (CURRENT) DRUG THERAPY: ICD-10-CM

## 2024-07-26 DIAGNOSIS — E55.0 RICKETS, ACTIVE: ICD-10-CM

## 2024-07-26 DIAGNOSIS — Z02.83 ENCOUNTER FOR BLOOD-ALCOHOL AND BLOOD-DRUG TEST: ICD-10-CM

## 2024-07-26 LAB
25(OH)D3+25(OH)D2 SERPL-MCNC: 31.7 NG/ML (ref 30–100)
HBA1C MFR BLD: 5.4 % (ref 4.5–5.6)

## 2024-07-26 PROCEDURE — PSEU8266 GLYCOHEMOGLOBIN: Performed by: CLINICAL MEDICAL LABORATORY

## 2024-07-26 PROCEDURE — 83036 HEMOGLOBIN GLYCOSYLATED A1C: CPT | Performed by: CLINICAL MEDICAL LABORATORY

## 2024-07-26 PROCEDURE — 80307 DRUG TEST PRSMV CHEM ANLYZR: CPT | Performed by: CLINICAL MEDICAL LABORATORY

## 2024-07-26 PROCEDURE — PSEU8229 VITAMIN D -25 HYDROXY: Performed by: CLINICAL MEDICAL LABORATORY

## 2024-07-26 PROCEDURE — 82306 VITAMIN D 25 HYDROXY: CPT | Performed by: CLINICAL MEDICAL LABORATORY

## 2024-07-26 PROCEDURE — PSEU8322 ETHANOL SCREEN, URINE: Performed by: CLINICAL MEDICAL LABORATORY

## 2024-07-27 LAB — ETHANOL UR-MCNC: 71 MG/DL

## 2024-09-24 ENCOUNTER — TRANSCRIBE ORDERS (OUTPATIENT)
Dept: SLEEP MEDICINE | Facility: HOSPITAL | Age: 44
End: 2024-09-24

## 2024-09-24 DIAGNOSIS — G47.33 OBSTRUCTIVE SLEEP APNEA (ADULT) (PEDIATRIC): Primary | ICD-10-CM

## 2024-10-07 ENCOUNTER — HOSPITAL ENCOUNTER (OUTPATIENT)
Dept: SLEEP MEDICINE | Facility: HOSPITAL | Age: 44
Discharge: HOME | End: 2024-10-07
Payer: MEDICARE

## 2024-10-07 DIAGNOSIS — G47.33 OBSTRUCTIVE SLEEP APNEA (ADULT) (PEDIATRIC): ICD-10-CM

## 2024-10-07 PROCEDURE — 95811 POLYSOM 6/>YRS CPAP 4/> PARM: CPT

## 2024-10-08 VITALS — HEIGHT: 64 IN | BODY MASS INDEX: 45.24 KG/M2 | WEIGHT: 265 LBS | OXYGEN SATURATION: 98 %

## 2025-03-31 ENCOUNTER — TELEPHONE (OUTPATIENT)
Dept: SLEEP MEDICINE | Facility: HOSPITAL | Age: 45
End: 2025-03-31
Payer: MEDICARE

## 2025-03-31 ENCOUNTER — TRANSCRIBE ORDERS (OUTPATIENT)
Dept: SLEEP MEDICINE | Facility: HOSPITAL | Age: 45
End: 2025-03-31

## 2025-03-31 DIAGNOSIS — G47.33 OBSTRUCTIVE SLEEP APNEA (ADULT) (PEDIATRIC): Primary | ICD-10-CM

## 2025-04-17 ENCOUNTER — TRANSCRIBE ORDERS (OUTPATIENT)
Dept: SLEEP MEDICINE | Facility: HOSPITAL | Age: 45
End: 2025-04-17

## 2025-04-17 DIAGNOSIS — G47.33 OBSTRUCTIVE SLEEP APNEA (ADULT) (PEDIATRIC): Primary | ICD-10-CM

## 2025-05-16 ENCOUNTER — HOSPITAL ENCOUNTER (OUTPATIENT)
Dept: SLEEP MEDICINE | Facility: HOSPITAL | Age: 45
Discharge: HOME | End: 2025-05-16
Attending: PSYCHIATRY & NEUROLOGY
Payer: MEDICARE

## 2025-05-16 DIAGNOSIS — G47.33 OBSTRUCTIVE SLEEP APNEA (ADULT) (PEDIATRIC): ICD-10-CM

## 2025-05-16 PROCEDURE — 95811 POLYSOM 6/>YRS CPAP 4/> PARM: CPT

## 2025-05-17 VITALS — OXYGEN SATURATION: 95 % | WEIGHT: 293 LBS | HEIGHT: 64 IN | HEART RATE: 77 BPM | BODY MASS INDEX: 50.02 KG/M2
